# Patient Record
Sex: MALE | Race: WHITE | NOT HISPANIC OR LATINO | Employment: OTHER | URBAN - METROPOLITAN AREA
[De-identification: names, ages, dates, MRNs, and addresses within clinical notes are randomized per-mention and may not be internally consistent; named-entity substitution may affect disease eponyms.]

---

## 2017-05-02 ENCOUNTER — TELEPHONE (OUTPATIENT)
Dept: MEDICAL GROUP | Facility: PHYSICIAN GROUP | Age: 72
End: 2017-05-02

## 2017-05-02 NOTE — TELEPHONE ENCOUNTER
NEW PATIENT VISIT PRE-VISIT PLANNING    1.  E[employer] Patient is checked in Patient Demographics? YES    2.  Immunizations were updated in [facility] using WebIZ?: Yes       •  Web Iz Recommendations: FLU HEPATITIS A  HEPATITIS B PREVNAR (PCV13)  TDAP ZOSTAVAX (Shingles)    3.  Patient is due for the following Health Maintenance Topics:   There are no preventive care reminders to display for this patient.    - Patient declines Colonoscopy/FIT     4.  Reviewed/Updated the following with patient:       •   Preferred Pharmacy? YES       •   Preferred Lab? YES       •   Medications? YES. Was Abstract Encounter opened and chart updated? YES       •   Social History? YES. Was Abstract Encounter opened and chart updated? YES       •   Family History? YES. Was Abstract Encounter opened and chart updated? YES    5.  Updated Care Team?       •   DME Company (gait device, O2, CPAP, etc.) YES       •   Other Specialists (eye doctor, derm, GYN, cardiology, endo, etc): N\A    6.  Patient was informed to arrive 15 min prior to their scheduled appointment and bring in their medication bottles? YES

## 2017-05-03 ENCOUNTER — OFFICE VISIT (OUTPATIENT)
Dept: MEDICAL GROUP | Facility: PHYSICIAN GROUP | Age: 72
End: 2017-05-03
Payer: MEDICARE

## 2017-05-03 VITALS
DIASTOLIC BLOOD PRESSURE: 70 MMHG | BODY MASS INDEX: 33.59 KG/M2 | HEART RATE: 64 BPM | OXYGEN SATURATION: 93 % | HEIGHT: 72 IN | WEIGHT: 248.02 LBS | SYSTOLIC BLOOD PRESSURE: 118 MMHG | TEMPERATURE: 98.7 F

## 2017-05-03 DIAGNOSIS — M51.36 DEGENERATION OF LUMBAR INTERVERTEBRAL DISC: ICD-10-CM

## 2017-05-03 DIAGNOSIS — E66.9 OBESITY (BMI 30-39.9): ICD-10-CM

## 2017-05-03 DIAGNOSIS — K21.9 GASTROESOPHAGEAL REFLUX DISEASE WITHOUT ESOPHAGITIS: ICD-10-CM

## 2017-05-03 DIAGNOSIS — R06.02 SOB (SHORTNESS OF BREATH): ICD-10-CM

## 2017-05-03 DIAGNOSIS — R73.03 PREDIABETES: ICD-10-CM

## 2017-05-03 DIAGNOSIS — I10 ESSENTIAL HYPERTENSION: ICD-10-CM

## 2017-05-03 DIAGNOSIS — G47.33 OSA (OBSTRUCTIVE SLEEP APNEA): ICD-10-CM

## 2017-05-03 DIAGNOSIS — N17.9 AKI (ACUTE KIDNEY INJURY) (HCC): ICD-10-CM

## 2017-05-03 PROCEDURE — G8419 CALC BMI OUT NRM PARAM NOF/U: HCPCS | Performed by: INTERNAL MEDICINE

## 2017-05-03 PROCEDURE — 1101F PT FALLS ASSESS-DOCD LE1/YR: CPT | Performed by: INTERNAL MEDICINE

## 2017-05-03 PROCEDURE — G8432 DEP SCR NOT DOC, RNG: HCPCS | Performed by: INTERNAL MEDICINE

## 2017-05-03 PROCEDURE — 3017F COLORECTAL CA SCREEN DOC REV: CPT | Mod: 8P | Performed by: INTERNAL MEDICINE

## 2017-05-03 PROCEDURE — 4040F PNEUMOC VAC/ADMIN/RCVD: CPT | Mod: 8P | Performed by: INTERNAL MEDICINE

## 2017-05-03 PROCEDURE — 1036F TOBACCO NON-USER: CPT | Performed by: INTERNAL MEDICINE

## 2017-05-03 PROCEDURE — 99204 OFFICE O/P NEW MOD 45 MIN: CPT | Performed by: INTERNAL MEDICINE

## 2017-05-03 RX ORDER — OMEPRAZOLE 20 MG/1
20 CAPSULE, DELAYED RELEASE ORAL PRN
Qty: 90 CAP | Refills: 3 | Status: SHIPPED | OUTPATIENT
Start: 2017-05-03 | End: 2018-06-06 | Stop reason: SDUPTHER

## 2017-05-03 ASSESSMENT — PATIENT HEALTH QUESTIONNAIRE - PHQ9: CLINICAL INTERPRETATION OF PHQ2 SCORE: 0

## 2017-05-03 NOTE — ASSESSMENT & PLAN NOTE
Pt had lab work 02/2017, note mild JIHAN. Pt is taking advil post lumbar spine surgery. He denies dysuria, hematuria. GFR before normal. Advised to stop NSAIDs. Will reevaluate

## 2017-05-03 NOTE — ASSESSMENT & PLAN NOTE
EGD 2 years ago showed ness esophageus. I resumed omeprazole. Needs repeat EGD. Will try to get records, will refer pt on GI on next apt . Denies hematochezia or dysphagia.

## 2017-05-03 NOTE — ASSESSMENT & PLAN NOTE
Pt tells me that last month he start to feel sob when he walks. He does not feel short of breath when he exerts himself when he does yard work. He denies chest pain, nausea, vomiting vomiting, palpitation. He denies orthopnea. He has trace lower edema, which he is not sure for how long. He denies cough or wheezing

## 2017-05-03 NOTE — ASSESSMENT & PLAN NOTE
Laminectomy 01/2017   1.  L3 through S1 decompressive lumbar laminectomies, bilateral    foraminotomies.  2.  Left L3 transpedicular approach far lateral decompression left L3 nerve    root.  3.  Left L4 transpedicular approach far lateral decompression left L4 nerve    root.  4.  Left L5 transpedicular approach far lateral decompression left L5 nerve    Root.  Doing well, back pain significantly improved. He started back on exercising

## 2017-05-03 NOTE — MR AVS SNAPSHOT
Valeriy Stringerer   5/3/2017 8:20 AM   Office Visit   MRN: [id number]    Department:  [facility]   Dept Phone:  [phone]    Description:  Male : 1945   Provider:  Kaci Ward M.D.           Reason for Visit     Gastrophageal Reflux     Shortness of Breath during walking      Allergies as of 5/3/2017     No Known Allergies      You were diagnosed with     Essential hypertension   [8127982]       Degeneration of lumbar intervertebral disc   [510720]       Prediabetes   [626188]       Gastroesophageal reflux disease without esophagitis   [032590]       Obesity (BMI 30-39.9)   [889339]       SOB (shortness of breath)   [984789]       MAVERICK (obstructive sleep apnea)   [827513]       JIHAN (acute kidney injury) (CMS-HCC)   [931047]         Vital Signs     Blood Pressure Pulse Temperature Height Weight Body Mass Index    118/70 mmHg 64 37.1 °C (98.7 °F) 1.829 m (6') 112.5 kg (248 lb 0.3 oz) 33.63 kg/m2    Oxygen Saturation Smoking Status                93% Never Smoker           Basic Information     Date Of Birth Sex Race Ethnicity Preferred Language    1945 Male White Non- English      Problem List              ICD-10-CM Priority Class Noted - Resolved    Degeneration of lumbar intervertebral disc M51.36   2016 - Present    Essential hypertension I10   5/3/2017 - Present    Prediabetes R73.03   5/3/2017 - Present    Gastroesophageal reflux disease without esophagitis K21.9   5/3/2017 - Present    Obesity (BMI 30-39.9) E66.9   5/3/2017 - Present    SOB (shortness of breath) R06.02   5/3/2017 - Present    MAVERICK (obstructive sleep apnea) G47.33   5/3/2017 - Present    JIHAN (acute kidney injury) (CMS-HCC) N17.9   5/3/2017 - Present      Health Maintenance        Date Due Completion Dates    IMM DTaP/Tdap/Td Vaccine (1 - Tdap) 1965 ---    COLONOSCOPY 1996 ---    IMM ZOSTER VACCINE 2006 ---    IMM PNEUMOCOCCAL 65+ (ADULT) LOW/MEDIUM RISK SERIES (1 of 2 - PCV13) 2011  ---            Current Immunizations     Influenza Vaccine Quad Inj (Preserved) 11/19/2013      Below and/or attached are the medications your provider expects you to take. Review all of your home medications and newly ordered medications with your provider and/or pharmacist. Follow medication instructions as directed by your provider and/or pharmacist. Please keep your medication list with you and share with your provider. Update the information when medications are discontinued, doses are changed, or new medications (including over-the-counter products) are added; and carry medication information at all times in the event of emergency situations     Allergies:  No Known Allergies          Medications  Valid as of: May 03, 2017 -  9:07 AM    Generic Name Brand Name Tablet Size Instructions for use    Lisinopril (Tab) PRINIVIL 10 MG Take 10 mg by mouth every day.        Melatonin   Take 3 Tabs by mouth at bedtime as needed (For sleep).        Omeprazole (CAPSULE DELAYED RELEASE) PRILOSEC 20 MG Take 1 Cap by mouth as needed (For heartburn).        .                 Medicines prescribed today were sent to:     [facility]/PHARMACY #[id number] - [place] - [address] [place] [address]    Phone: [phone] Fax: [phone]    Open 24 Hours?: No      Medication refill instructions:       If your prescription bottle indicates you have medication refills left, it is not necessary to call your provider’s office. Please contact your pharmacy and they will refill your medication.    If your prescription bottle indicates you do not have any refills left, you may request refills at any time through one of the following ways: The online Cellay system (except Urgent Care), by calling your provider’s office, or by asking your pharmacy to contact your provider’s office with a refill request. Medication refills are processed only during regular business hours and may not be available until the next business day. Your provider  may request additional information or to have a follow-up visit with you prior to refilling your medication.   *Please Note: Medication refills are assigned a new Rx number when refilled electronically. Your pharmacy may indicate that no refills were authorized even though a new prescription for the same medication is available at the pharmacy. Please request the medicine by name with the pharmacy before contacting your provider for a refill.        Your To Do List     Future Labs/Procedures Complete By Expires    COMP METABOLIC PANEL  As directed 5/4/2018    HEMOGLOBIN A1C  As directed 5/4/2018    MICROALBUMIN CREAT RATIO URINE  As directed 5/4/2018    URINE CHLORIDE RANDOM  As directed 5/3/2018    URINE CREATININE RANDOM  As directed 5/3/2018    URINE POTASSIUM RANDOM  As directed 5/3/2018    URINE SODIUM RANDOM  As directed 5/3/2018         [employer] Access Code: Activation code not generated  Current [employer] Status: Active

## 2017-05-03 NOTE — PROGRESS NOTES
New Patient to Establish    Reason to establish: Hypertension, shortness of breath, lab work review     Valeriy Orlando is a 71 y.o. male here today for evaluation and management of:    JIHAN (acute kidney injury) (CMS-[facility])  Pt had lab work 02/2017, note mild JIHAN. Pt is taking advil post lumbar spine surgery. He denies dysuria, hematuria. GFR before normal. Advised to stop NSAIDs. Will reevaluate     Degeneration of lumbar intervertebral disc  Laminectomy 01/2017   1.  L3 through S1 decompressive lumbar laminectomies, bilateral    foraminotomies.  2.  Left L3 transpedicular approach far lateral decompression left L3 nerve    root.  3.  Left L4 transpedicular approach far lateral decompression left L4 nerve    root.  4.  Left L5 transpedicular approach far lateral decompression left L5 nerve    Root.  Doing well, back pain significantly improved. He started back on exercising     Essential hypertension  Well controled on lisinopril 10 mg daily     Gastroesophageal reflux disease without esophagitis  EGD 2 years ago showed ness esophageus. I resumed omeprazole. Needs repeat EGD. Will try to get records, will refer pt on GI on next apt . Denies hematochezia or dysphagia.    Obesity (BMI 30-39.9)  He likes ice cream. Counseling provided, small portion, balanced diet, exercising.    MAVERICK (obstructive sleep apnea)  He uses CPAP every night. Diagnosed 5 years ago.    Prediabetes  A1c 5.9. Will continue to monitor     SOB (shortness of breath)  Pt tells me that last month he start to feel sob when he walks. He does not feel short of breath when he exerts himself when he does yard work. He denies chest pain, nausea, vomiting vomiting, palpitation. He denies orthopnea. He has trace lower edema, which he is not sure for how long. He denies cough or wheezing         Past Medical History   Diagnosis Date   • Heart burn    • Snoring    • Hepatitis A 2001   • Sleep apnea      cpap in use   • Depression      pre diabetes   •  Hypertension    • Prediabetes        Current Outpatient Prescriptions   Medication Sig Dispense Refill   • omeprazole (PRILOSEC) 20 MG delayed-release capsule Take 1 Cap by mouth as needed (For heartburn). 90 Cap 3   • lisinopril (PRINIVIL) 10 MG Tab Take 10 mg by mouth every day.     • MELATONIN PO Take 3 Tabs by mouth at bedtime as needed (For sleep).       No current facility-administered medications for this visit.       Allergies as of 05/03/2017   • (No Known Allergies)       Social History     Social History   • Marital Status: Single     Spouse Name: N/A   • Number of Children: N/A   • Years of Education: N/A     Occupational History   • Not on file.     Social History Main Topics   • Smoking status: Never Smoker    • Smokeless tobacco: Never Used   • Alcohol Use: Yes      Comment: 25 per month/ 1 glass wine per day   • Drug Use: No   • Sexual Activity:     Partners: Female     Other Topics Concern   • Not on file     Social History Narrative       Family History   Problem Relation Age of Onset   • Heart Disease Mother    • Cancer Mother      lung cancer   • Heart Disease Maternal Grandmother    • Diabetes Paternal Grandfather        Past Surgical History   Procedure Laterality Date   • Other orthopedic surgery  1990     right knee scope   • Lumbar laminectomy diskectomy  11/29/2016     Procedure: LUMBAR LAMINECTOMY DISKECTOMY L3-5 open laminectomy ;  Surgeon: Bentley Claudio M.D.;  Location: SURGERY [facility];  Service:    • Knee arthroscopy     • Groin exploration       growth       ROS: All system reviewed and were negative except for history of present illness    /70 mmHg  Pulse 64  Temp(Src) 37.1 °C (98.7 °F)  Ht 1.829 m (6')  Wt 112.5 kg (248 lb 0.3 oz)  BMI 33.63 kg/m2  SpO2 93%    Physical Exam  General:  Alert and oriented, No apparent distress.  Eyes: Pupils equal and reactive. No scleral icterus. EOMI  Throat: Clear no erythema or exudates noted. Oral mucosa moist, oral dental  intact  Neck: Supple. No cervical or supraclavicular lymphadenopathy noted. Thyroid not enlarged.  Lungs: normal effort,  Clear to auscultation  Cardiovascular: Regular rate and rhythm. No murmurs, rubs or gallops, pulses intact   Abdomen:  Soft, +BS, no tenderness. No rebound or guarding noted. No hepato or splenomegaly   Extremities: No clubbing, cyanosis, trace pitting edema.  Neuro: cranial nerves intact, sensation intact   Muscle skeletal: moves all extremities, no back tenderness or muscle cramps , ROM intact   Skin: Clear. No rash , he has some seborrhoic keratosis on his hands, previous scar tissue from lesion removal         Assessment and Plan    1. Essential hypertension  Well controled. contineu same regimen   - COMP METABOLIC PANEL; Future  - MICROALBUMIN CREAT RATIO URINE; Future    2. Degeneration of lumbar intervertebral disc  Stable, tylenol prn. Avoid NSAIDs    3. Prediabetes  Counseling provided on low carb diet  - HEMOGLOBIN A1C; Future    4. Gastroesophageal reflux disease without esophagitis  Stable., however pt has King esophageus. Resume prilosec. reevaluate possible GI ref on next apt     5. Obesity (BMI 30-39.9)  Counseling provides  - Patient identified as having weight management issue.  Appropriate orders and counseling given.    6. SOB (shortness of breath)  Possible due to decondition or slight lung atelectasis due to anesthesia and being inactive     7. MAVERICK (obstructive sleep apnea)  Continue CPAP machine     8. JIHAN (acute kidney injury) (CMS-HCC)  Likely prerenal due to surgery and NSAIDs use  reevaluate   - COMP METABOLIC PANEL; Future  - URINE SODIUM RANDOM; Future  - URINE CHLORIDE RANDOM; Future  - URINE POTASSIUM RANDOM; Future  - URINE CREATININE RANDOM; Future      Followup: Return in about 4 weeks (around 5/31/2017) for Short.    Signed by: Kaci Ward M.D.

## 2017-05-03 NOTE — Clinical Note
[facility]  Kaci Ward M.D.  [address]  [place] [address]  Fax: [phone]   Authorization for Release/Disclosure of   Protected Health Information   Name: MICHELL ALANIS : 1945 SSN: [id number]   Address: [address] [place] [address] [place] [address] Phone:    [phone] (home)    I authorize the entity listed below to release/disclose the PHI below to:   [facility]/Kaci Ward M.D. and Kaci Ward M.D.   Provider or Entity Name:  [facility]   Address   City, [facility], [facility] Phone:  [phone]    Fax:  [phone]   Reason for request: continuity of care   Information to be released:    [  ] LAST COLONOSCOPY,  including any PATH REPORT and follow-up  [  ] LAST FIT/COLOGUARD RESULT [  ] LAST DEXA  [  ] LAST MAMMOGRAM  [  ] LAST PAP  [  ] LAST LABS [  ] RETINA EXAM REPORT  [  ] IMMUNIZATION RECORDS  [  ] Release all info      [  ] Check here and initial the line next to each item to release ALL health information INCLUDING  _____ Care and treatment for drug and / or alcohol abuse  _____ HIV testing, infection status, or AIDS  _____ Genetic Testing    DATES OF SERVICE OR TIME PERIOD TO BE DISCLOSED: _____________  I understand and acknowledge that:  * This Authorization may be revoked at any time by you in writing, except if your health information has already been used or disclosed.  * Your health information that will be used or disclosed as a result of you signing this authorization could be re-disclosed by the recipient. If this occurs, your re-disclosed health information may no longer be protected by State or Federal laws.  * You may refuse to sign this Authorization. Your refusal will not affect your ability to obtain treatment.  * This Authorization becomes effective upon signing and will  on (date) __________.      If no date is indicated, this Authorization will  one (1) year from the signature date.    Name: Michell Alanis    Signature:   Date:          5/3/2017       PLEASE FAX REQUESTED RECORDS BACK TO: [phone]

## 2017-05-10 ENCOUNTER — OFFICE VISIT (OUTPATIENT)
Dept: MEDICAL GROUP | Facility: PHYSICIAN GROUP | Age: 72
End: 2017-05-10
Payer: MEDICARE

## 2017-05-10 VITALS
HEIGHT: 72 IN | HEART RATE: 66 BPM | DIASTOLIC BLOOD PRESSURE: 90 MMHG | WEIGHT: 242.51 LBS | OXYGEN SATURATION: 95 % | SYSTOLIC BLOOD PRESSURE: 140 MMHG | BODY MASS INDEX: 32.85 KG/M2 | TEMPERATURE: 98.8 F

## 2017-05-10 DIAGNOSIS — M51.36 DEGENERATION OF LUMBAR INTERVERTEBRAL DISC: ICD-10-CM

## 2017-05-10 DIAGNOSIS — N17.9 AKI (ACUTE KIDNEY INJURY) (HCC): ICD-10-CM

## 2017-05-10 DIAGNOSIS — Z23 NEED FOR VACCINATION: ICD-10-CM

## 2017-05-10 DIAGNOSIS — Z12.11 SCREEN FOR COLON CANCER: ICD-10-CM

## 2017-05-10 DIAGNOSIS — I10 ESSENTIAL HYPERTENSION: ICD-10-CM

## 2017-05-10 DIAGNOSIS — K21.9 GASTROESOPHAGEAL REFLUX DISEASE WITHOUT ESOPHAGITIS: ICD-10-CM

## 2017-05-10 DIAGNOSIS — R06.02 SOB (SHORTNESS OF BREATH): ICD-10-CM

## 2017-05-10 DIAGNOSIS — R73.03 PREDIABETES: ICD-10-CM

## 2017-05-10 PROCEDURE — 4040F PNEUMOC VAC/ADMIN/RCVD: CPT | Performed by: INTERNAL MEDICINE

## 2017-05-10 PROCEDURE — G8432 DEP SCR NOT DOC, RNG: HCPCS | Performed by: INTERNAL MEDICINE

## 2017-05-10 PROCEDURE — 90670 PCV13 VACCINE IM: CPT | Performed by: INTERNAL MEDICINE

## 2017-05-10 PROCEDURE — 99214 OFFICE O/P EST MOD 30 MIN: CPT | Mod: 25 | Performed by: INTERNAL MEDICINE

## 2017-05-10 PROCEDURE — 90471 IMMUNIZATION ADMIN: CPT | Performed by: INTERNAL MEDICINE

## 2017-05-10 PROCEDURE — G0009 ADMIN PNEUMOCOCCAL VACCINE: HCPCS | Performed by: INTERNAL MEDICINE

## 2017-05-10 PROCEDURE — 90715 TDAP VACCINE 7 YRS/> IM: CPT | Performed by: INTERNAL MEDICINE

## 2017-05-10 PROCEDURE — 1036F TOBACCO NON-USER: CPT | Performed by: INTERNAL MEDICINE

## 2017-05-10 PROCEDURE — 1101F PT FALLS ASSESS-DOCD LE1/YR: CPT | Performed by: INTERNAL MEDICINE

## 2017-05-10 PROCEDURE — G8419 CALC BMI OUT NRM PARAM NOF/U: HCPCS | Performed by: INTERNAL MEDICINE

## 2017-05-10 RX ORDER — LISINOPRIL 10 MG/1
10 TABLET ORAL DAILY
Qty: 90 TAB | Refills: 3 | Status: SHIPPED | OUTPATIENT
Start: 2017-05-10 | End: 2018-05-04 | Stop reason: SDUPTHER

## 2017-05-10 NOTE — ASSESSMENT & PLAN NOTE
EGD 2015 showed Jimenez esophageous. Needs to follow up with GI. I encouraged him to take omeprazole 20 mg daily. Follow up with GI. Denies hematochezia or dysphagia

## 2017-05-10 NOTE — MR AVS SNAPSHOT
Valeriy Orlando   5/10/2017 10:00 AM   Office Visit   MRN: [id number]    Department:  [facility]   Dept Phone:  [phone]    Description:  Male : 1945   Provider:  Kaci Ward M.D.           Reason for Visit     Back Pain lower, left side    Medication Refill lisinorpil    Injection pneumo, tdap, shingle      Allergies as of 5/10/2017     No Known Allergies      You were diagnosed with     Degeneration of lumbar intervertebral disc   [041505]       Prediabetes   [948813]       Screen for colon cancer   [435630]       Essential hypertension   [1219891]       Gastroesophageal reflux disease without esophagitis   [852856]       Need for vaccination   [477519]       SOB (shortness of breath)   [804437]       JIHAN (acute kidney injury) (CMS-[facility])   [829274]         Vital Signs     Blood Pressure Pulse Temperature Height Weight Body Mass Index    140/90 mmHg 66 37.1 °C (98.8 °F) 1.829 m (6') 110 kg (242 lb 8.1 oz) 32.88 kg/m2    Oxygen Saturation Smoking Status                95% Never Smoker           Basic Information     Date Of Birth Sex Race Ethnicity Preferred Language    1945 Male White Non- English      Your appointments     2017  8:40 AM   Established Patient with Kaci Ward M.D.   [facility] (--)    [address] [facility]  [address]  [place] [id number] [phone]           You will be receiving a confirmation call a few days before your appointment from our automated call confirmation system.              Problem List              ICD-10-CM Priority Class Noted - Resolved    Degeneration of lumbar intervertebral disc M51.36   2016 - Present    Essential hypertension I10   5/3/2017 - Present    Prediabetes R73.03   5/3/2017 - Present    Gastroesophageal reflux disease without esophagitis K21.9   5/3/2017 - Present    Obesity (BMI 30-39.9) E66.9   5/3/2017 - Present    SOB (shortness of breath) R06.02   5/3/2017 - Present    MAVERICK (obstructive sleep  apnea) G47.33   5/3/2017 - Present    JIHAN (acute kidney injury) (CMS-HCC) N17.9   5/3/2017 - Present      Health Maintenance        Date Due Completion Dates    IMM DTaP/Tdap/Td Vaccine (1 - Tdap) 12/11/1965 ---    COLONOSCOPY 12/11/1996 ---    IMM ZOSTER VACCINE 12/11/2006 ---    IMM PNEUMOCOCCAL 65+ (ADULT) LOW/MEDIUM RISK SERIES (1 of 2 - PCV13) 12/11/2011 ---            Current Immunizations     13-VALENT PCV PREVNAR  Incomplete    Influenza Vaccine Quad Inj (Preserved) 11/19/2013    Tdap Vaccine  Incomplete      Below and/or attached are the medications your provider expects you to take. Review all of your home medications and newly ordered medications with your provider and/or pharmacist. Follow medication instructions as directed by your provider and/or pharmacist. Please keep your medication list with you and share with your provider. Update the information when medications are discontinued, doses are changed, or new medications (including over-the-counter products) are added; and carry medication information at all times in the event of emergency situations     Allergies:  No Known Allergies          Medications  Valid as of: May 10, 2017 - 10:39 AM    Generic Name Brand Name Tablet Size Instructions for use    Lisinopril (Tab) PRINIVIL 10 MG Take 1 Tab by mouth every day.        Melatonin   Take 3 Tabs by mouth at bedtime as needed (For sleep).        Omeprazole (CAPSULE DELAYED RELEASE) PRILOSEC 20 MG Take 1 Cap by mouth as needed (For heartburn).        .                 Medicines prescribed today were sent to:     [facility]/PHARMACY #[id number] - [place] - [address] [place] [address]    Phone: [phone] Fax: [phone]    Open 24 Hours?: No      Medication refill instructions:       If your prescription bottle indicates you have medication refills left, it is not necessary to call your provider’s office. Please contact your pharmacy and they will refill your medication.    If your prescription  bottle indicates you do not have any refills left, you may request refills at any time through one of the following ways: The online [employer] system (except Urgent Care), by calling your provider’s office, or by asking your pharmacy to contact your provider’s office with a refill request. Medication refills are processed only during regular business hours and may not be available until the next business day. Your provider may request additional information or to have a follow-up visit with you prior to refilling your medication.   *Please Note: Medication refills are assigned a new Rx number when refilled electronically. Your pharmacy may indicate that no refills were authorized even though a new prescription for the same medication is available at the pharmacy. Please request the medicine by name with the pharmacy before contacting your provider for a refill.        Referral     A referral request has been sent to our patient care coordination department. Please allow 3-5 business days for us to process this request and contact you either by phone or mail. If you do not hear from us by the 5th business day, please call us at [phone].           [employer] Access Code: Activation code not generated  Current [employer] Status: Active

## 2017-05-10 NOTE — ASSESSMENT & PLAN NOTE
Patient tells me that he still continues to have shortness of breath when he walks. He does not have any shortness of breath when exerting himself or when he does yard work. Denies any chest pain, nausea, vomiting, palpitation. He denies any cough or wheezing

## 2017-05-10 NOTE — ASSESSMENT & PLAN NOTE
Laminectomy 01/2017    1.  L3 through S1 decompressive lumbar laminectomies, bilateral    foraminotomies.  2.  Left L3 transpedicular approach far lateral decompression left L3 nerve    root.  3.  Left L4 transpedicular approach far lateral decompression left L4 nerve    root.  4.  Left L5 transpedicular approach far lateral decompression left L5 nerve    Root.  Today he tells me that he has some left sided flank pain when he stand up. No tenderness. He denies any radiation. He described pain and sharp. Relieves when he is walking. He denies any hematuria or dysuria. He stopped taking naproxen. He is taking Tylenol as needed with good relief.

## 2017-05-10 NOTE — PROGRESS NOTES
Subjective:   Valeriy Orlando is a 71 y.o. male here today for pain, hypertension, lab work    SOB (shortness of breath)  Patient tells me that he still continues to have shortness of breath when he walks. He does not have any shortness of breath when exerting himself or when he does yard work. Denies any chest pain, nausea, vomiting, palpitation. He denies any cough or wheezing    Prediabetes  a1c 5.9. Lab work pending     Gastroesophageal reflux disease without esophagitis  EGD 2015 showed Jimenez esophageous. Needs to follow up with GI. I encouraged him to take omeprazole 20 mg daily. Follow up with GI. Denies hematochezia or dysphagia    Essential hypertension  Slightly elevated today. Blood pressure today on the 140/90. He is currently on lisinopril 10 mg daily . He denies any headache, chest pain, blurred vision, palpitation, leg swelling.    Degeneration of lumbar intervertebral disc  Laminectomy 01/2017    1.  L3 through S1 decompressive lumbar laminectomies, bilateral    foraminotomies.  2.  Left L3 transpedicular approach far lateral decompression left L3 nerve    root.  3.  Left L4 transpedicular approach far lateral decompression left L4 nerve    root.  4.  Left L5 transpedicular approach far lateral decompression left L5 nerve    Root.  Today he tells me that he has some left sided flank pain when he stand up. No tenderness. He denies any radiation. He described pain and sharp. Relieves when he is walking. He denies any hematuria or dysuria. He stopped taking naproxen. He is taking Tylenol as needed with good relief.    JIHAN (acute kidney injury) (CMS-[facility])  Pt had lab work 02/2017, mild JIHAN. Stopped NSAIDs. Lab work pending         Current medicines (including changes today)  Current Outpatient Prescriptions   Medication Sig Dispense Refill   • lisinopril (PRINIVIL) 10 MG Tab Take 1 Tab by mouth every day. 90 Tab 3   • omeprazole (PRILOSEC) 20 MG delayed-release capsule Take 1 Cap by mouth as needed  (For heartburn). 90 Cap 3   • MELATONIN PO Take 3 Tabs by mouth at bedtime as needed (For sleep).       No current facility-administered medications for this visit.     He  has a past medical history of Heart burn; Snoring; Hepatitis A (2001); Sleep apnea; Depression; Hypertension; and Prediabetes.    Current Outpatient Prescriptions   Medication Sig Dispense Refill   • lisinopril (PRINIVIL) 10 MG Tab Take 1 Tab by mouth every day. 90 Tab 3   • omeprazole (PRILOSEC) 20 MG delayed-release capsule Take 1 Cap by mouth as needed (For heartburn). 90 Cap 3   • MELATONIN PO Take 3 Tabs by mouth at bedtime as needed (For sleep).       No current facility-administered medications for this visit.       Allergies as of 05/10/2017   • (No Known Allergies)       Social History     Social History   • Marital Status: Single     Spouse Name: N/A   • Number of Children: N/A   • Years of Education: N/A     Occupational History   • Not on file.     Social History Main Topics   • Smoking status: Never Smoker    • Smokeless tobacco: Never Used   • Alcohol Use: Yes      Comment: 25 per month/ 1 glass wine per day   • Drug Use: No   • Sexual Activity:     Partners: Female     Other Topics Concern   • Not on file     Social History Narrative        Family History   Problem Relation Age of Onset   • Heart Disease Mother    • Cancer Mother      lung cancer   • Heart Disease Maternal Grandmother    • Diabetes Paternal Grandfather        Past Surgical History   Procedure Laterality Date   • Other orthopedic surgery  1990     right knee scope   • Lumbar laminectomy diskectomy  11/29/2016     Procedure: LUMBAR LAMINECTOMY DISKECTOMY L3-5 open laminectomy ;  Surgeon: Bentley Claudio M.D.;  Location: SURGERY [facility];  Service:    • Knee arthroscopy     • Groin exploration       growth       ROS   No chest pain, no shortness of breath, no abdominal pain       Objective:     Blood pressure 140/90, pulse 66, temperature 37.1 °C (98.8 °F),  height 1.829 m (6'), weight 110 kg (242 lb 8.1 oz), SpO2 95 %. Body mass index is 32.88 kg/(m^2).   Physical Exam:  Constitutional: Alert, no distress.  Skin: Warm, dry, good turgor, no rashes in visible areas.  Eye: Equal, round and reactive, conjunctiva clear, lids normal.  ENMT: Lips without lesions, good dentition, oropharynx clear.  Neck: Trachea midline, no masses, no thyromegaly. No cervical or supraclavicular lymphadenopathy  Respiratory: Unlabored respiratory effort, lungs clear to auscultation, no wheezes, no ronchi.  Cardiovascular: Normal S1, S2, no murmur, no edema.  Abdomen: Soft, non-tender, no masses, no hepatosplenomegaly.  SPINE: No significant spinal curvature on forward bend. No  tenderness in paraspinous muscles lumbar spine without current spasm. SLT negative. DTR 2+ patella. Strength 5/5 proximal and distal LE.  No pain with stress of SI. Full hip ROM. Poor hamstring flexibility. No symptoms with axial loading.   Psych: Alert and oriented x3, normal affect and mood.        Assessment and Plan:   The following treatment plan was discussed    1. Degeneration of lumbar intervertebral disc  Musculoskeletal pain. Pain is not colicky pain. UA to rule out nephrolithiasis  - UA/M W/RFLX CULTURE, ROUTINE    2. Prediabetes  Counseling provided in weight loss, low carb diet and low-fat diet  We'll continue to monitor    3. Screen for colon cancer  - REFERRAL TO GI FOR COLONOSCOPY    4. Essential hypertension  Continue to monitor. Continue lisinopril 10 mg daily  - lisinopril (PRINIVIL) 10 MG Tab; Take 1 Tab by mouth every day.  Dispense: 90 Tab; Refill: 3    5. Gastroesophageal reflux disease without esophagitis  King esophagus. Likely need repeat EGD . Continue omeprazole 20 mg daily  - REFERRAL TO GI FOR COLONOSCOPY    6. Need for vaccination  - TDAP VACCINE =>8YO IM  - PNEUMOCOCCAL CONJUGATE VACCINE 13-VALENT    7. SOB (shortness of breath)  Possible atelectasis. EKG 12/2016 no ST changes . Advised  excersing     8. JIHAN (acute kidney injury) (CMS-HCC)  Possible prerenal, NSAIDs use??  Continue to monitor       Followup: Return in about 4 weeks (around 6/7/2017) for Short.

## 2017-05-10 NOTE — ASSESSMENT & PLAN NOTE
Slightly elevated today. Blood pressure today on the 140/90. He is currently on lisinopril 10 mg daily . He denies any headache, chest pain, blurred vision, palpitation, leg swelling.

## 2017-05-16 ENCOUNTER — HOSPITAL ENCOUNTER (OUTPATIENT)
Dept: LAB | Facility: MEDICAL CENTER | Age: 72
End: 2017-05-16
Attending: INTERNAL MEDICINE
Payer: MEDICARE

## 2017-05-16 DIAGNOSIS — R73.03 PREDIABETES: ICD-10-CM

## 2017-05-16 DIAGNOSIS — N17.9 AKI (ACUTE KIDNEY INJURY) (HCC): ICD-10-CM

## 2017-05-16 DIAGNOSIS — I10 ESSENTIAL HYPERTENSION: ICD-10-CM

## 2017-05-16 LAB
ALBUMIN SERPL BCP-MCNC: 4.4 G/DL (ref 3.2–4.9)
ALBUMIN/GLOB SERPL: 1.4 G/DL
ALP SERPL-CCNC: 53 U/L (ref 30–99)
ALT SERPL-CCNC: 22 U/L (ref 2–50)
ANION GAP SERPL CALC-SCNC: 6 MMOL/L (ref 0–11.9)
APPEARANCE UR: CLEAR
AST SERPL-CCNC: 16 U/L (ref 12–45)
BILIRUB SERPL-MCNC: 0.6 MG/DL (ref 0.1–1.5)
BILIRUB UR QL STRIP.AUTO: NEGATIVE
BUN SERPL-MCNC: 35 MG/DL (ref 8–22)
CALCIUM SERPL-MCNC: 10.3 MG/DL (ref 8.5–10.5)
CHLORIDE SERPL-SCNC: 105 MMOL/L (ref 96–112)
CHLORIDE UR-SCNC: 109 MMOL/L
CO2 SERPL-SCNC: 25 MMOL/L (ref 20–33)
COLOR UR: COLORLESS
CREAT SERPL-MCNC: 1.39 MG/DL (ref 0.5–1.4)
CREAT UR-MCNC: 61.1 MG/DL
CREAT UR-MCNC: 61.4 MG/DL
CULTURE IF INDICATED INDCX: NO UA CULTURE
EST. AVERAGE GLUCOSE BLD GHB EST-MCNC: 123 MG/DL
GFR SERPL CREATININE-BSD FRML MDRD: 50 ML/MIN/1.73 M 2
GLOBULIN SER CALC-MCNC: 3.1 G/DL (ref 1.9–3.5)
GLUCOSE SERPL-MCNC: 119 MG/DL (ref 65–99)
GLUCOSE UR STRIP.AUTO-MCNC: NEGATIVE MG/DL
HBA1C MFR BLD: 5.9 % (ref 0–5.6)
KETONES UR STRIP.AUTO-MCNC: NEGATIVE MG/DL
LEUKOCYTE ESTERASE UR QL STRIP.AUTO: NEGATIVE
MICRO URNS: NORMAL
MICROALBUMIN UR-MCNC: <0.7 MG/DL
MICROALBUMIN/CREAT UR: NORMAL MG/G (ref 0–30)
NITRITE UR QL STRIP.AUTO: NEGATIVE
PH UR STRIP.AUTO: 5.5 [PH]
POTASSIUM SERPL-SCNC: 4.8 MMOL/L (ref 3.6–5.5)
POTASSIUM UR-SCNC: 45.8 MMOL/L
PROT SERPL-MCNC: 7.5 G/DL (ref 6–8.2)
PROT UR QL STRIP: NEGATIVE MG/DL
RBC UR QL AUTO: NEGATIVE
SODIUM SERPL-SCNC: 136 MMOL/L (ref 135–145)
SODIUM UR-SCNC: 84 MMOL/L
SP GR UR STRIP.AUTO: 1.01

## 2017-05-16 PROCEDURE — 84300 ASSAY OF URINE SODIUM: CPT

## 2017-05-16 PROCEDURE — 82436 ASSAY OF URINE CHLORIDE: CPT

## 2017-05-16 PROCEDURE — 80053 COMPREHEN METABOLIC PANEL: CPT

## 2017-05-16 PROCEDURE — 82570 ASSAY OF URINE CREATININE: CPT

## 2017-05-16 PROCEDURE — 83036 HEMOGLOBIN GLYCOSYLATED A1C: CPT | Mod: GA

## 2017-05-16 PROCEDURE — 36415 COLL VENOUS BLD VENIPUNCTURE: CPT

## 2017-05-16 PROCEDURE — 84133 ASSAY OF URINE POTASSIUM: CPT

## 2017-05-16 PROCEDURE — 81003 URINALYSIS AUTO W/O SCOPE: CPT

## 2017-05-16 PROCEDURE — 82043 UR ALBUMIN QUANTITATIVE: CPT

## 2017-05-17 ENCOUNTER — TELEPHONE (OUTPATIENT)
Dept: MEDICAL GROUP | Facility: PHYSICIAN GROUP | Age: 72
End: 2017-05-17

## 2017-05-17 NOTE — TELEPHONE ENCOUNTER
----- Message from Kaci Ward M.D. sent at 5/16/2017  7:11 PM PDT -----  Please let the patient on the urine test is normalno protein or blood into his urine. He is electrolytes, liver enzyme are normal. He is prediabetic and therefore I encouraged patient to have low-carb diet. His kidney function is slightly worse likely due to dehydration please encourage patient to drink plenty of fluids and avoid medications as NSAIDs like ibuprofen, Motrin, Advil. I'll recheck his kidney function at next appointment.  Thank you,   Kaci Ward M.D.

## 2017-06-05 ENCOUNTER — TELEPHONE (OUTPATIENT)
Dept: MEDICAL GROUP | Facility: PHYSICIAN GROUP | Age: 72
End: 2017-06-05

## 2017-06-05 NOTE — TELEPHONE ENCOUNTER
ESTABLISHED PATIENT PRE-VISIT PLANNING     Note: Patient will not be contacted if there is no indication to call.     1.  Reviewed notes from the last few office visits within the medical group: Yes    2.  If any orders were placed at last visit or intended to be done for this visit (i.e. 6 mos follow-up), do we have Results/Consult Notes?        •  Labs - Labs ordered, completed and results are in chart.       •  Imaging - Imaging was not ordered at last office visit.       •  Referrals - Referral ordered, patient has NOT been seen.    3. Is this appointment scheduled as a Hospital Follow-Up? No    4.  Immunizations were updated in [employer] using WebIZ?: Yes       •  Web Iz Recommendations: FLU HEPATITIS A  ZOSTAVAX (Shingles)    5.  Patient is due for the following Health Maintenance Topics:   Health Maintenance Due   Topic Date Due   • Annual Wellness Visit  1945   • COLONOSCOPY  12/11/1996   • IMM ZOSTER VACCINE  12/11/2006       - Patient has completed FLU, PREVNAR (PCV13)  and TDAP Immunization(s) per WebIZ. Chart has been updated.    6.  Patient was NOT informed to arrive 15 min prior to their scheduled appointment and bring in their medication bottles.

## 2017-06-06 ENCOUNTER — OFFICE VISIT (OUTPATIENT)
Dept: MEDICAL GROUP | Facility: PHYSICIAN GROUP | Age: 72
End: 2017-06-06
Payer: MEDICARE

## 2017-06-06 ENCOUNTER — HOSPITAL ENCOUNTER (OUTPATIENT)
Dept: LAB | Facility: MEDICAL CENTER | Age: 72
End: 2017-06-06
Attending: INTERNAL MEDICINE
Payer: MEDICARE

## 2017-06-06 VITALS
SYSTOLIC BLOOD PRESSURE: 138 MMHG | WEIGHT: 243 LBS | DIASTOLIC BLOOD PRESSURE: 86 MMHG | BODY MASS INDEX: 32.2 KG/M2 | RESPIRATION RATE: 16 BRPM | HEIGHT: 73 IN | TEMPERATURE: 98.6 F | HEART RATE: 67 BPM | OXYGEN SATURATION: 93 %

## 2017-06-06 DIAGNOSIS — R14.0 ABDOMINAL BLOATING: ICD-10-CM

## 2017-06-06 DIAGNOSIS — M51.36 DEGENERATION OF LUMBAR INTERVERTEBRAL DISC: ICD-10-CM

## 2017-06-06 DIAGNOSIS — K21.9 GASTROESOPHAGEAL REFLUX DISEASE WITHOUT ESOPHAGITIS: ICD-10-CM

## 2017-06-06 DIAGNOSIS — R06.02 SOB (SHORTNESS OF BREATH): ICD-10-CM

## 2017-06-06 DIAGNOSIS — N17.9 AKI (ACUTE KIDNEY INJURY) (HCC): ICD-10-CM

## 2017-06-06 DIAGNOSIS — R94.31 ABNORMAL EKG: ICD-10-CM

## 2017-06-06 DIAGNOSIS — I10 ESSENTIAL HYPERTENSION: ICD-10-CM

## 2017-06-06 LAB
ANION GAP SERPL CALC-SCNC: 6 MMOL/L (ref 0–11.9)
BUN SERPL-MCNC: 30 MG/DL (ref 8–22)
CALCIUM SERPL-MCNC: 9.4 MG/DL (ref 8.5–10.5)
CHLORIDE SERPL-SCNC: 107 MMOL/L (ref 96–112)
CO2 SERPL-SCNC: 26 MMOL/L (ref 20–33)
CREAT SERPL-MCNC: 1.35 MG/DL (ref 0.5–1.4)
GFR SERPL CREATININE-BSD FRML MDRD: 52 ML/MIN/1.73 M 2
GLUCOSE SERPL-MCNC: 107 MG/DL (ref 65–99)
POTASSIUM SERPL-SCNC: 5 MMOL/L (ref 3.6–5.5)
SODIUM SERPL-SCNC: 139 MMOL/L (ref 135–145)

## 2017-06-06 PROCEDURE — 36415 COLL VENOUS BLD VENIPUNCTURE: CPT

## 2017-06-06 PROCEDURE — 99214 OFFICE O/P EST MOD 30 MIN: CPT | Performed by: INTERNAL MEDICINE

## 2017-06-06 PROCEDURE — G8432 DEP SCR NOT DOC, RNG: HCPCS | Performed by: INTERNAL MEDICINE

## 2017-06-06 PROCEDURE — 80048 BASIC METABOLIC PNL TOTAL CA: CPT

## 2017-06-06 PROCEDURE — 1036F TOBACCO NON-USER: CPT | Performed by: INTERNAL MEDICINE

## 2017-06-06 PROCEDURE — G8419 CALC BMI OUT NRM PARAM NOF/U: HCPCS | Performed by: INTERNAL MEDICINE

## 2017-06-06 PROCEDURE — 1101F PT FALLS ASSESS-DOCD LE1/YR: CPT | Performed by: INTERNAL MEDICINE

## 2017-06-06 PROCEDURE — 4040F PNEUMOC VAC/ADMIN/RCVD: CPT | Performed by: INTERNAL MEDICINE

## 2017-06-06 NOTE — ASSESSMENT & PLAN NOTE
Ordered EKG because of SOB. EKG is sinus rhythm, CA interval 197 (first degree block) , normal axis deviations, Q wave on inferior leads (III, aVF) and poor R progression on anterior  Leads (V2-V3), T inversions on III and aVF.   As per above he has SOB, possible equivalent to angina?? No typical chest pain, no active angina at this time. Stable angina?? Possible atypical angina. Pt is prediabetic, he is only on lisinopril. Referral to cardiology asap for MPI and echo.

## 2017-06-06 NOTE — ASSESSMENT & PLAN NOTE
S/p laminectomy, 01/2007. Still sometimes his back hurt, when he lifts up heavy objects at yard. He is very careful to not lift up heavy objects . Better if he walks. No radiation. He was doing better initally after surgery. He is using tylenol prn and that helps with the pain., he likes to stay active. He is back on exercising . Denies trauma.

## 2017-06-06 NOTE — PROGRESS NOTES
Subjective:   Valeriy Orlando is a 71 y.o. male here today for bloating, hypertension, GERD, lab work, sob    Degeneration of lumbar intervertebral disc  S/p laminectomy, 01/2007. Still sometimes his back hurt, when he lifts up heavy objects at yard. He is very careful to not lift up heavy objects . Better if he walks. No radiation. He was doing better initally after surgery. He is using tylenol prn and that helps with the pain., he likes to stay active. He is back on exercising . Denies trauma.     Essential hypertension  Well-controlled on lisinopril 10 mg daily. He denies headache, chest pain, blurry vision, palpitation, leg swelling    Gastroesophageal reflux disease without esophagitis  EGD 2015 showed Jimenez esophageous. He is back on omeprazole and he is feeling better. He denies melanotic stool, hematochezia, abdominal pain, unintentional weight loss. He called GI and was told that he needs a repeat EGD and colonoscopy in 5 years. Last colonoscopy 2015    SOB (shortness of breath)  He still reports shortness of breath when he walks. He denies shortness of breath when he exercises or when he does yard work. He denies chest pain, nausea, vomiting, palpitation. He denies coughing or wheezing. He reports second hand exposure when he was a kid from his parents. He denies orthopnea or PND. No medardo heart disease      JIHAN (acute kidney injury) (CMS-[facility])  JIHAN 05/16/2017. He used to be on NSAIDs at that time. Will reorder lab work for reevaluation. he denies hematuria, dysuria     Abdominal bloating  He tells me that sometimes he feels bloated especially if he eats junk food. he denies abdominal pain, acid reflux , melanotic stools, unintentional weight loss, hematochezia. He is up to date with colonoscopy. He does not eat yogurt regular.    Abnormal EKG  Ordered EKG because of SOB. EKG is sinus rhythm, WA interval 197 (first degree block) , normal axis deviations, Q wave on inferior leads (III, aVF) and poor R  progression on anterior  Leads (V2-V3), T inversions on III and aVF.   As per above he has SOB, possible equivalent to angina?? No typical chest pain, no active angina at this time. Stable angina?? Possible atypical angina. Pt is prediabetic, he is only on lisinopril. Referral to cardiology asap for MPI and echo.         Current medicines (including changes today)  Current Outpatient Prescriptions   Medication Sig Dispense Refill   • lisinopril (PRINIVIL) 10 MG Tab Take 1 Tab by mouth every day. 90 Tab 3   • omeprazole (PRILOSEC) 20 MG delayed-release capsule Take 1 Cap by mouth as needed (For heartburn). 90 Cap 3   • MELATONIN PO Take 3 Tabs by mouth at bedtime as needed (For sleep).       No current facility-administered medications for this visit.     He  has a past medical history of Heart burn; Snoring; Hepatitis A (2001); Sleep apnea; Depression; Hypertension; and Prediabetes.    Current Outpatient Prescriptions   Medication Sig Dispense Refill   • lisinopril (PRINIVIL) 10 MG Tab Take 1 Tab by mouth every day. 90 Tab 3   • omeprazole (PRILOSEC) 20 MG delayed-release capsule Take 1 Cap by mouth as needed (For heartburn). 90 Cap 3   • MELATONIN PO Take 3 Tabs by mouth at bedtime as needed (For sleep).       No current facility-administered medications for this visit.       Allergies as of 06/06/2017   • (No Known Allergies)       Social History     Social History   • Marital Status: Single     Spouse Name: N/A   • Number of Children: N/A   • Years of Education: N/A     Occupational History   • Not on file.     Social History Main Topics   • Smoking status: Never Smoker    • Smokeless tobacco: Never Used   • Alcohol Use: Yes      Comment: 25 per month/ 1 glass wine per day   • Drug Use: No   • Sexual Activity:     Partners: Female     Other Topics Concern   • Not on file     Social History Narrative        Family History   Problem Relation Age of Onset   • Heart Disease Mother    • Cancer Mother      lung cancer  "  • Heart Disease Maternal Grandmother    • Diabetes Paternal Grandfather        Past Surgical History   Procedure Laterality Date   • Other orthopedic surgery  1990     right knee scope   • Lumbar laminectomy diskectomy  11/29/2016     Procedure: LUMBAR LAMINECTOMY DISKECTOMY L3-5 open laminectomy ;  Surgeon: Bentley Claudio M.D.;  Location: SURGERY [facility];  Service:    • Knee arthroscopy     • Groin exploration       growth       ROS   All systems reviewed are negative except for HPI       Objective:     Blood pressure 138/86, pulse 67, temperature 37 °C (98.6 °F), resp. rate 16, height 1.842 m (6' 0.52\"), weight 110.224 kg (243 lb), SpO2 93 %. Body mass index is 32.49 kg/(m^2).   Physical Exam:  Constitutional: Alert, no distress.  Skin: Warm, dry, good turgor, no rashes in visible areas.  Eye: Equal, round and reactive, conjunctiva clear, lids normal.  ENMT: Lips without lesions, good dentition, oropharynx clear.  Neck: Trachea midline, no masses, no thyromegaly. No cervical or supraclavicular lymphadenopathy  Respiratory: Unlabored respiratory effort, lungs clear to auscultation, no wheezes, no ronchi.  Cardiovascular: Normal S1, S2, no murmur, trace edema bilateral  Abdomen: Soft, non-tender, no masses, no hepatosplenomegaly.  Psych: Alert and oriented x3, normal affect and mood.        Assessment and Plan:   The following treatment plan was discussed    1. SOB (shortness of breath)  2. Abnormal EKG   EKG reading as per above . Needs cards evaluation as soon as possible. Prediabetic. No signs of unstable angina at this time. Possible old MI ???   - EKG; Future    3. Gastroesophageal reflux disease without esophagitis  Continue omeprazole, follow up routine EGD with GI     4. JIHAN (acute kidney injury) (CMS-HCC)  Continue to monitor . Lab work to reevaluate   - BASIC METABOLIC PANEL; Future    5. Essential hypertension  Continue to monitor. Continue same treatment   - BASIC METABOLIC PANEL; Future    6. " Degeneration of lumbar intervertebral disc  Tylenol prn.     7. Abdominal bloating  Probiotic, or activa. No red flag. Up to date with colonoscopy       Followup: Return in about 4 weeks (around 7/4/2017).

## 2017-06-06 NOTE — ASSESSMENT & PLAN NOTE
He tells me that sometimes he feels bloated especially if he eats junk food. he denies abdominal pain, acid reflux , melanotic stools, unintentional weight loss, hematochezia. He is up to date with colonoscopy. He does not eat yogurt regular.

## 2017-06-06 NOTE — ASSESSMENT & PLAN NOTE
Well-controlled on lisinopril 10 mg daily. He denies headache, chest pain, blurry vision, palpitation, leg swelling

## 2017-06-06 NOTE — MR AVS SNAPSHOT
"        Valeriy Orlando   2017 8:40 AM   Office Visit   MRN: [id number]    Department:  [facility]   Dept Phone:  [phone]    Description:  Male : 1945   Provider:  Kaci Ward M.D.           Reason for Visit     Follow-Up 1 month follow up heart burn      Allergies as of 2017     No Known Allergies      You were diagnosed with     Gastroesophageal reflux disease without esophagitis   [439457]       JIHAN (acute kidney injury) (CMS-HCC)   [788462]       SOB (shortness of breath)   [403726]       Essential hypertension   [5124789]       Degeneration of lumbar intervertebral disc   [003535]       Abdominal bloating   [270676]         Vital Signs     Blood Pressure Pulse Temperature Respirations Height Weight    138/86 mmHg 67 37 °C (98.6 °F) 16 1.842 m (6' 0.52\") 110.224 kg (243 lb)    Body Mass Index Oxygen Saturation Smoking Status             32.49 kg/m2 93% Never Smoker          Basic Information     Date Of Birth Sex Race Ethnicity Preferred Language    1945 Male White Non- English      Your appointments     Dec 12, 2017  8:20 AM   Established Patient with Kcai Ward M.D.   [facility] (--)    [address]  [address]  [facility] [address] [phone]           You will be receiving a confirmation call a few days before your appointment from our automated call confirmation system.              Problem List              ICD-10-CM Priority Class Noted - Resolved    Degeneration of lumbar intervertebral disc M51.36   2016 - Present    Essential hypertension I10   5/3/2017 - Present    Prediabetes R73.03   5/3/2017 - Present    Gastroesophageal reflux disease without esophagitis K21.9   5/3/2017 - Present    Obesity (BMI 30-39.9) E66.9   5/3/2017 - Present    SOB (shortness of breath) R06.02   5/3/2017 - Present    MAVERICK (obstructive sleep apnea) G47.33   5/3/2017 - Present    JIHAN (acute kidney injury) (CMS-HCC) N17.9   5/3/2017 - Present    Abdominal " bloating R14.0   6/6/2017 - Present      Health Maintenance        Date Due Completion Dates    COLONOSCOPY 12/11/1996 ---    IMM ZOSTER VACCINE 12/11/2006 ---    IMM PNEUMOCOCCAL 65+ (ADULT) LOW/MEDIUM RISK SERIES (2 of 2 - PPSV23) 5/10/2018 5/10/2017    IMM DTaP/Tdap/Td Vaccine (2 - Td) 5/10/2027 5/10/2017            Current Immunizations     13-VALENT PCV PREVNAR 5/10/2017    Influenza Vaccine Quad Inj (Preserved) 11/19/2013    Tdap Vaccine 5/10/2017      Below and/or attached are the medications your provider expects you to take. Review all of your home medications and newly ordered medications with your provider and/or pharmacist. Follow medication instructions as directed by your provider and/or pharmacist. Please keep your medication list with you and share with your provider. Update the information when medications are discontinued, doses are changed, or new medications (including over-the-counter products) are added; and carry medication information at all times in the event of emergency situations     Allergies:  No Known Allergies          Medications  Valid as of: June 06, 2017 -  9:22 AM    Generic Name Brand Name Tablet Size Instructions for use    Lisinopril (Tab) PRINIVIL 10 MG Take 1 Tab by mouth every day.        Melatonin   Take 3 Tabs by mouth at bedtime as needed (For sleep).        Omeprazole (CAPSULE DELAYED RELEASE) PRILOSEC 20 MG Take 1 Cap by mouth as needed (For heartburn).        .                 Medicines prescribed today were sent to:     [id number] - [place] - [address] [place] [address]    Phone: [phone] Fax: [phone]    Open 24 Hours?: No      Medication refill instructions:       If your prescription bottle indicates you have medication refills left, it is not necessary to call your provider’s office. Please contact your pharmacy and they will refill your medication.    If your prescription bottle indicates you do not have any refills left, you may  request refills at any time through one of the following ways: The online [employer] system (except Urgent Care), by calling your provider’s office, or by asking your pharmacy to contact your provider’s office with a refill request. Medication refills are processed only during regular business hours and may not be available until the next business day. Your provider may request additional information or to have a follow-up visit with you prior to refilling your medication.   *Please Note: Medication refills are assigned a new Rx number when refilled electronically. Your pharmacy may indicate that no refills were authorized even though a new prescription for the same medication is available at the pharmacy. Please request the medicine by name with the pharmacy before contacting your provider for a refill.        Your To Do List     Future Labs/Procedures Complete By Expires    BASIC METABOLIC PANEL  As directed 6/7/2018    EKG  As directed 6/6/2018         [employer] Access Code: Activation code not generated  Current [employer] Status: Active

## 2017-06-06 NOTE — ASSESSMENT & PLAN NOTE
EGD 2015 showed Jimeenz esophageous. He is back on omeprazole and he is feeling better. He denies melanotic stool, hematochezia, abdominal pain, unintentional weight loss. He called GI and was told that he needs a repeat EGD and colonoscopy in 5 years. Last colonoscopy 2015

## 2017-06-06 NOTE — ASSESSMENT & PLAN NOTE
He still reports shortness of breath when he walks. He denies shortness of breath when he exercises or when he does yard work. He denies chest pain, nausea, vomiting, palpitation. He denies coughing or wheezing. He reports second hand exposure when he was a kid from his parents. He denies orthopnea or PND. No medardo heart disease

## 2017-06-06 NOTE — ASSESSMENT & PLAN NOTE
JIHAN 05/16/2017. He used to be on NSAIDs at that time. Will reorder lab work for reevaluation. he denies hematuria, dysuria

## 2017-06-07 NOTE — PROGRESS NOTES
Quick Note:    Mahamed Crooks,  I just wanted to let you know that the labs look good. Kidney function stable. We can discuss at your next appointment.   Best,  Kaci Ward M.D.    ______

## 2017-06-12 ENCOUNTER — OFFICE VISIT (OUTPATIENT)
Dept: CARDIOLOGY | Facility: MEDICAL CENTER | Age: 72
End: 2017-06-12
Payer: MEDICARE

## 2017-06-12 VITALS
HEIGHT: 72 IN | DIASTOLIC BLOOD PRESSURE: 74 MMHG | WEIGHT: 244 LBS | HEART RATE: 70 BPM | OXYGEN SATURATION: 93 % | BODY MASS INDEX: 33.05 KG/M2 | SYSTOLIC BLOOD PRESSURE: 126 MMHG

## 2017-06-12 DIAGNOSIS — E66.9 OBESITY (BMI 30-39.9): ICD-10-CM

## 2017-06-12 DIAGNOSIS — I10 ESSENTIAL HYPERTENSION: ICD-10-CM

## 2017-06-12 DIAGNOSIS — Z13.6 SCREENING FOR ISCHEMIC HEART DISEASE: ICD-10-CM

## 2017-06-12 DIAGNOSIS — N18.3 CKD (CHRONIC KIDNEY DISEASE), STAGE 3 (MODERATE): ICD-10-CM

## 2017-06-12 DIAGNOSIS — Z77.22 SECONDHAND SMOKE EXPOSURE: ICD-10-CM

## 2017-06-12 DIAGNOSIS — G47.33 OSA ON CPAP: ICD-10-CM

## 2017-06-12 DIAGNOSIS — R06.09 DOE (DYSPNEA ON EXERTION): ICD-10-CM

## 2017-06-12 DIAGNOSIS — E88.810 METABOLIC SYNDROME X: ICD-10-CM

## 2017-06-12 DIAGNOSIS — R06.02 SOB (SHORTNESS OF BREATH): ICD-10-CM

## 2017-06-12 DIAGNOSIS — R94.31 ABNORMAL EKG: ICD-10-CM

## 2017-06-12 DIAGNOSIS — G47.33 OSA (OBSTRUCTIVE SLEEP APNEA): ICD-10-CM

## 2017-06-12 LAB — EKG IMPRESSION: NORMAL

## 2017-06-12 PROCEDURE — 99204 OFFICE O/P NEW MOD 45 MIN: CPT | Performed by: INTERNAL MEDICINE

## 2017-06-12 PROCEDURE — 93000 ELECTROCARDIOGRAM COMPLETE: CPT | Performed by: INTERNAL MEDICINE

## 2017-06-12 NOTE — MR AVS SNAPSHOT
"        Valeriy Orlando   2017 1:30 PM   Office Visit   MRN: [id number]    Department:  [facility]   Dept Phone:  [phone]    Description:  Male : 1945   Provider:  Marlene Jung MD,[facility]           Reason for Visit     New Patient           Allergies as of 2017     No Known Allergies      You were diagnosed with     SOB (shortness of breath)   [978483]       Essential hypertension   [8856462]       Obesity (BMI 30-39.9)   [743855]       MAVERICK (obstructive sleep apnea)   [276027]       Abnormal EKG   [577191]       Screening for ischemic heart disease   [V81.0.ICD-9-CM]       CKD (chronic kidney disease), stage 3 (moderate)   [9004685]       Metabolic syndrome X   [145048]       GROSSMAN (dyspnea on exertion)   [790460]       Secondhand smoke exposure   [650463]       MAVERICK on CPAP   [722201]         Vital Signs     Blood Pressure Pulse Height Weight Body Mass Index Oxygen Saturation    126/74 mmHg 70 1.829 m (6' 0.01\") 110.678 kg (244 lb) 33.09 kg/m2 93%    Smoking Status                   Never Smoker            Basic Information     Date Of Birth Sex Race Ethnicity Preferred Language    1945 Male White Non- English      Your appointments     Aug 14, 2017  1:30 PM   FOLLOW UP with Marlene Jung MD,[facility] (--)    [address]  [address]  [place] [id number] [phone]            Dec 12, 2017  8:20 AM   Established Patient with Kaci Ward M.D.   [facility] (--)    [address]  [address]  [place] [id number] [phone]           You will be receiving a confirmation call a few days before your appointment from our automated call confirmation system.              Problem List              ICD-10-CM Priority Class Noted - Resolved    Degeneration of lumbar intervertebral disc M51.36   2016 - Present    Essential hypertension I10   5/3/2017 - Present    Prediabetes R73.03   " 5/3/2017 - Present    Gastroesophageal reflux disease without esophagitis K21.9   5/3/2017 - Present    Obesity (BMI 30-39.9) E66.9   5/3/2017 - Present    SOB (shortness of breath) R06.02   5/3/2017 - Present    MVAERICK (obstructive sleep apnea) G47.33   5/3/2017 - Present    JIHAN (acute kidney injury) (CMS-HCC) N17.9   5/3/2017 - Present    Abdominal bloating R14.0   6/6/2017 - Present    Abnormal EKG R94.31   6/6/2017 - Present      Health Maintenance        Date Due Completion Dates    COLONOSCOPY 12/11/1996 ---    IMM ZOSTER VACCINE 12/11/2006 ---    IMM PNEUMOCOCCAL 65+ (ADULT) LOW/MEDIUM RISK SERIES (2 of 2 - PPSV23) 5/10/2018 5/10/2017    IMM DTaP/Tdap/Td Vaccine (2 - Td) 5/10/2027 5/10/2017            Results       Current Immunizations     13-VALENT PCV PREVNAR 5/10/2017    Influenza Vaccine Quad Inj (Preserved) 11/19/2013    Tdap Vaccine 5/10/2017      Below and/or attached are the medications your provider expects you to take. Review all of your home medications and newly ordered medications with your provider and/or pharmacist. Follow medication instructions as directed by your provider and/or pharmacist. Please keep your medication list with you and share with your provider. Update the information when medications are discontinued, doses are changed, or new medications (including over-the-counter products) are added; and carry medication information at all times in the event of emergency situations     Allergies:  No Known Allergies          Medications  Valid as of: June 12, 2017 -  2:20 PM    Generic Name Brand Name Tablet Size Instructions for use    Aspirin (Tab) aspirin 81 MG Take 81 mg by mouth every day.        Lisinopril (Tab) PRINIVIL 10 MG Take 1 Tab by mouth every day.        Melatonin   Take 3 Tabs by mouth at bedtime as needed (For sleep).        Omeprazole (CAPSULE DELAYED RELEASE) PRILOSEC 20 MG Take 1 Cap by mouth as needed (For heartburn).        .                 Medicines prescribed today  were sent to:     [id number] - [place] - [address] [place] [address]    Phone: [phone] Fax: [phone]    Open 24 Hours?: No      Medication refill instructions:       If your prescription bottle indicates you have medication refills left, it is not necessary to call your provider’s office. Please contact your pharmacy and they will refill your medication.    If your prescription bottle indicates you do not have any refills left, you may request refills at any time through one of the following ways: The online [employer] system (except Urgent Care), by calling your provider’s office, or by asking your pharmacy to contact your provider’s office with a refill request. Medication refills are processed only during regular business hours and may not be available until the next business day. Your provider may request additional information or to have a follow-up visit with you prior to refilling your medication.   *Please Note: Medication refills are assigned a new Rx number when refilled electronically. Your pharmacy may indicate that no refills were authorized even though a new prescription for the same medication is available at the pharmacy. Please request the medicine by name with the pharmacy before contacting your provider for a refill.           [employer] Access Code: Activation code not generated  Current [employer] Status: Active

## 2017-06-12 NOTE — PROGRESS NOTES
Cardiology Consult Note:    Marlene Jung  Date & Time note created:    6/12/2017   2:05 PM       Patient ID:  Name:             Valeriy Orlando   YOB: 1945  Age:                 71 y.o.  male   MRN:               [id number]                                                             Chief Complaint:   Chief Complaint   Patient presents with   • New Patient             History of Present Illness:   Valeriy Orlando has recent increased GROSSMAN; active but snd hand smoking growing up; acknowledges MetSyn-X; MAVERICK on CPAP      Review of Systems:     Constitutional: Denies fevers, Denies weight changes  Eyes: Denies changes in vision, no eye pain  Ears/Nose/Throat/Mouth: Denies nasal congestion or sore throat   Cardiovascular: Denies chest pain or palpitations   Respiratory: Denies shortness of breath , Denies cough  Gastrointestinal/Hepatic: Denies abdominal pain, nausea, vomiting, diarrhea, constipation or GI bleeding   Genitourinary: Denies bladder dysfunction, dysuria or frequency  Musculoskeletal/Rheum: Denies  joint pain and swelling   Skin/Breast: Denies rash, denies breast lumps or discharge  Neurological: Denies headache, confusion, memory loss or focal weakness/parasthesias  Psychiatric: denies mood disorder   Endocrine: denies hx of diabetes or thyroid dysfunction  Heme/Oncology/Lymph Nodes: Denies enlarged lymph nodes, denies bruising or known bleeding disorder  Allergic/Immunologic: Denies hx of allergies      All other systems were reviewed and are negative (AMA/CMS criteria)    Past Medical History:   Past Medical History   Diagnosis Date   • Heart burn    • Snoring    • Hepatitis A 2001   • Sleep apnea      cpap in use   • Depression      pre diabetes   • Hypertension    • Prediabetes          Past Surgical History:  Past Surgical History   Procedure Laterality Date   • Other orthopedic surgery  1990     right knee scope   • Lumbar laminectomy diskectomy  11/29/2016     Procedure: LUMBAR  LAMINECTOMY DISKECTOMY L3-5 open laminectomy ;  Surgeon: Bentley Claudio M.D.;  Location: SURGERY [facility];  Service:    • Knee arthroscopy     • Groin exploration       growth       Hospital Medications:    Current outpatient prescriptions:   •  aspirin 81 MG tablet, Take 81 mg by mouth every day., Disp: , Rfl:   •  lisinopril (PRINIVIL) 10 MG Tab, Take 1 Tab by mouth every day., Disp: 90 Tab, Rfl: 3  •  omeprazole (PRILOSEC) 20 MG delayed-release capsule, Take 1 Cap by mouth as needed (For heartburn)., Disp: 90 Cap, Rfl: 3  •  MELATONIN PO, Take 3 Tabs by mouth at bedtime as needed (For sleep)., Disp: , Rfl:     Current Outpatient Medications:  Current Outpatient Prescriptions   Medication Sig Dispense Refill   • aspirin 81 MG tablet Take 81 mg by mouth every day.     • lisinopril (PRINIVIL) 10 MG Tab Take 1 Tab by mouth every day. 90 Tab 3   • omeprazole (PRILOSEC) 20 MG delayed-release capsule Take 1 Cap by mouth as needed (For heartburn). 90 Cap 3   • MELATONIN PO Take 3 Tabs by mouth at bedtime as needed (For sleep).       No current facility-administered medications for this visit.         Medication Allergy/Sensitivities:  No Known Allergies    Family History:    Family History   Problem Relation Age of Onset   • Heart Disease Mother    • Cancer Mother      lung cancer   • Heart Disease Maternal Grandmother    • Diabetes Paternal Grandfather        Social History:  Social History     Social History   • Marital Status: Single     Spouse Name: N/A   • Number of Children: N/A   • Years of Education: N/A     Occupational History   • Not on file.     Social History Main Topics   • Smoking status: Never Smoker    • Smokeless tobacco: Never Used   • Alcohol Use: Yes      Comment: 25 per month/ 1 glass wine per day   • Drug Use: No   • Sexual Activity:     Partners: Female     Other Topics Concern   • Not on file     Social History Narrative         Physical Exam:  Vitals  Weight/BMI: Body mass index is 33.09  "kg/(m^2).  Blood pressure 126/74, pulse 70, height 1.829 m (6' 0.01\"), weight 110.678 kg (244 lb), SpO2 93 %.  Filed Vitals:    06/12/17 1324   BP: 126/74   Pulse: 70   Height: 1.829 m (6' 0.01\")   Weight: 110.678 kg (244 lb)   SpO2: 93%     Oxygen Therapy:  Pulse Oximetry: 93 %  General Appearance:  Obese;  Well developed, Well nourished, No acute distress, Non-toxic appearance.   HENT:  Normocephalic, Atraumatic, Oropharynx moist mucous membranes, Dentition: Mallampati 4 OP, Nose normal.    Eyes:  PERRLA, EOMI, Conjunctiva normal, No discharge.  Neck:  Normal range of motion, No cervical tenderness, Supple, No stridor, no JVD .  No thyromegaly.  No carotid bruit.  Cardiovascular:  Normal heart rate, Normal rhythm,  S1, S2, no S3,  S4; No gallops; No murmurs, No rubs, .   Extremitites with intact distal pulses, no cyanosis, clubbing or edema.  No heaves, thrills, HJR;  Peripheral pulses: carotid 2+, brachial 2+, radial 2+, ulnar 2+, femoral 2+, popliteal 2+, PT 2+, DP 2+;  Lungs:  Respiratory effort is normal. Normal breath sounds, breath sounds clear to auscultation bilaterally,  no rales, no rhonchi, no wheezing.   Abdomen: Bowel sounds normal, Soft, No tenderness, No guarding, No rebound, No masses, No hepatosplenomegaly.  Skin: Warm, Dry, No erythema, No rash, no induration or crepitus.  Neurologic: Alert & oriented x 3, Normal motor function, Normal sensory function, No focal deficits noted, cranial nerves II through XII are normal, ab normal gait.  Psychiatric: Affect normal, Judgment normal, Mood normal.      Data Review:     Records reviewed and summarized in current documentation    Lab Data Review:  Lab Results   Component Value Date/Time    SODIUM 139 06/06/2017 09:38 AM    POTASSIUM 5.0 06/06/2017 09:38 AM    CHLORIDE 107 06/06/2017 09:38 AM    CO2 26 06/06/2017 09:38 AM    GLUCOSE 107* 06/06/2017 09:38 AM    BUN 30* 06/06/2017 09:38 AM    CREATININE 1.35 06/06/2017 09:38 AM      Lab Results   Component " Value Date/Time    PT 12.5 11/23/2016 08:11 AM    INR 0.91 11/23/2016 08:11 AM      Lab Results   Component Value Date/Time    WBC 5.2 11/23/2016 08:11 AM    RBC 4.43* 11/23/2016 08:11 AM    HEMOGLOBIN 14.6 11/23/2016 08:11 AM    HEMATOCRIT 43.0 11/23/2016 08:11 AM    MCV 97.1 11/23/2016 08:11 AM    MCH 33.0 11/23/2016 08:11 AM    MCHC 34.0 11/23/2016 08:11 AM    MPV 9.3 11/23/2016 08:11 AM    NEUTROPHILS-POLYS 53.00 11/23/2016 08:11 AM    LYMPHOCYTES 28.30 11/23/2016 08:11 AM    MONOCYTES 8.10 11/23/2016 08:11 AM    EOSINOPHILS 9.40* 11/23/2016 08:11 AM    BASOPHILS 1.00 11/23/2016 08:11 AM        Imaging/Procedures Review:    To my review shows:na    EKG To my review shows:SR 66 bpm, QTc 394 msec    Assessment and Plan.   71 y.o. male has increased GROSSMAN, smking exposure and CV risks and pls see orders for eval; Concerns about his cardiac status  Long discussion about MAVERICK and cardiac effect;    1. SOB (shortness of breath)    - EKG  - PULMONARY FUNCTION TESTS Test requested: Complete Pulmonary Function Test    2. Essential hypertension  controlled    3. Obesity (BMI 30-39.9)    - LIPID PANEL    4. MAVERICK (obstructive sleep apnea)  On CPAP    5. Abnormal EKG  reviewed    6. Screening for ischemic heart disease    - LIPID PANEL  - CBC WITHOUT DIFFERENTIAL    7. CKD (chronic kidney disease), stage 3 (moderate)  reviewed    8. Metabolic syndrome X  A1c 5.9%  - LIPID PANEL    9. GROSSMAN (dyspnea on exertion)    - PULMONARY FUNCTION TESTS Test requested: Complete Pulmonary Function Test    10. Secondhand smoke exposure  Discussed risks and educated about the subject related matters        Return to clinic in  2 months  1. SOB (shortness of breath)  EKG    PULMONARY FUNCTION TESTS Test requested: Complete Pulmonary Function Test   2. Essential hypertension     3. Obesity (BMI 30-39.9)  LIPID PANEL   4. MAVERICK (obstructive sleep apnea)     5. Abnormal EKG     6. Screening for ischemic heart disease  LIPID PANEL    CBC WITHOUT  DIFFERENTIAL   7. CKD (chronic kidney disease), stage 3 (moderate)     8. Metabolic syndrome X  LIPID PANEL   9. GROSSMAN (dyspnea on exertion)  PULMONARY FUNCTION TESTS Test requested: Complete Pulmonary Function Test   10. Secondhand smoke exposure

## 2017-06-12 NOTE — Clinical Note
[facility]   [address]   [address]  [place] [address]  Phone: [phone]  Fax: [phone]              Valeriy Orlando  1945    Encounter Date: 6/12/2017    Kaci Ward M.D.    Thank you for the referral. I had the pleasure of seeing Valeriy Orlando today in cardiology clinic. I've attached my visit note below. If you have any questions please feel free to give me a call anytime.      Marlene Jung MD, PhD, [facility]  Cardiology and Lipidology  [facility]                                                                  PROGRESS NOTE:  Cardiology Consult Note:    Marlene Jung  Date & Time note created:    6/12/2017   2:05 PM       Patient ID:  Name:             Valeriy Orlando   YOB: 1945  Age:                 71 y.o.  male   MRN:               [id number]                                                             Chief Complaint:   Chief Complaint   Patient presents with   • New Patient             History of Present Illness:   Valeriy Orlando has recent increased GROSSMAN; active but snd hand smoking growing up; acknowledges MetSyn-X; MAVERICK on CPAP      Review of Systems:     Constitutional: Denies fevers, Denies weight changes  Eyes: Denies changes in vision, no eye pain  Ears/Nose/Throat/Mouth: Denies nasal congestion or sore throat   Cardiovascular: Denies chest pain or palpitations   Respiratory: Denies shortness of breath , Denies cough  Gastrointestinal/Hepatic: Denies abdominal pain, nausea, vomiting, diarrhea, constipation or GI bleeding   Genitourinary: Denies bladder dysfunction, dysuria or frequency  Musculoskeletal/Rheum: Denies  joint pain and swelling   Skin/Breast: Denies rash, denies breast lumps or discharge  Neurological: Denies headache, confusion, memory loss or focal weakness/parasthesias  Psychiatric: denies mood disorder   Endocrine: denies hx of diabetes or  thyroid dysfunction  Heme/Oncology/Lymph Nodes: Denies enlarged lymph nodes, denies bruising or known bleeding disorder  Allergic/Immunologic: Denies hx of allergies      All other systems were reviewed and are negative (AMA/CMS criteria)    Past Medical History:   Past Medical History   Diagnosis Date   • Heart burn    • Snoring    • Hepatitis A 2001   • Sleep apnea      cpap in use   • Depression      pre diabetes   • Hypertension    • Prediabetes          Past Surgical History:  Past Surgical History   Procedure Laterality Date   • Other orthopedic surgery  1990     right knee scope   • Lumbar laminectomy diskectomy  11/29/2016     Procedure: LUMBAR LAMINECTOMY DISKECTOMY L3-5 open laminectomy ;  Surgeon: Bentley Claudio M.D.;  Location: SURGERY [facility];  Service:    • Knee arthroscopy     • Groin exploration       growth       Hospital Medications:    Current outpatient prescriptions:   •  aspirin 81 MG tablet, Take 81 mg by mouth every day., Disp: , Rfl:   •  lisinopril (PRINIVIL) 10 MG Tab, Take 1 Tab by mouth every day., Disp: 90 Tab, Rfl: 3  •  omeprazole (PRILOSEC) 20 MG delayed-release capsule, Take 1 Cap by mouth as needed (For heartburn)., Disp: 90 Cap, Rfl: 3  •  MELATONIN PO, Take 3 Tabs by mouth at bedtime as needed (For sleep)., Disp: , Rfl:     Current Outpatient Medications:  Current Outpatient Prescriptions   Medication Sig Dispense Refill   • aspirin 81 MG tablet Take 81 mg by mouth every day.     • lisinopril (PRINIVIL) 10 MG Tab Take 1 Tab by mouth every day. 90 Tab 3   • omeprazole (PRILOSEC) 20 MG delayed-release capsule Take 1 Cap by mouth as needed (For heartburn). 90 Cap 3   • MELATONIN PO Take 3 Tabs by mouth at bedtime as needed (For sleep).       No current facility-administered medications for this visit.         Medication Allergy/Sensitivities:  No Known Allergies    Family History:    Family History   Problem Relation Age of Onset   • Heart Disease Mother    • Cancer Mother    "    lung cancer   • Heart Disease Maternal Grandmother    • Diabetes Paternal Grandfather        Social History:  Social History     Social History   • Marital Status: Single     Spouse Name: N/A   • Number of Children: N/A   • Years of Education: N/A     Occupational History   • Not on file.     Social History Main Topics   • Smoking status: Never Smoker    • Smokeless tobacco: Never Used   • Alcohol Use: Yes      Comment: 25 per month/ 1 glass wine per day   • Drug Use: No   • Sexual Activity:     Partners: Female     Other Topics Concern   • Not on file     Social History Narrative         Physical Exam:  Vitals  Weight/BMI: Body mass index is 33.09 kg/(m^2).  Blood pressure 126/74, pulse 70, height 1.829 m (6' 0.01\"), weight 110.678 kg (244 lb), SpO2 93 %.  Filed Vitals:    06/12/17 1324   BP: 126/74   Pulse: 70   Height: 1.829 m (6' 0.01\")   Weight: 110.678 kg (244 lb)   SpO2: 93%     Oxygen Therapy:  Pulse Oximetry: 93 %  General Appearance:  Obese;  Well developed, Well nourished, No acute distress, Non-toxic appearance.   HENT:  Normocephalic, Atraumatic, Oropharynx moist mucous membranes, Dentition: Mallampati 4 OP, Nose normal.    Eyes:  PERRLA, EOMI, Conjunctiva normal, No discharge.  Neck:  Normal range of motion, No cervical tenderness, Supple, No stridor, no JVD .  No thyromegaly.  No carotid bruit.  Cardiovascular:  Normal heart rate, Normal rhythm,  S1, S2, no S3,  S4; No gallops; No murmurs, No rubs, .   Extremitites with intact distal pulses, no cyanosis, clubbing or edema.  No heaves, thrills, HJR;  Peripheral pulses: carotid 2+, brachial 2+, radial 2+, ulnar 2+, femoral 2+, popliteal 2+, PT 2+, DP 2+;  Lungs:  Respiratory effort is normal. Normal breath sounds, breath sounds clear to auscultation bilaterally,  no rales, no rhonchi, no wheezing.   Abdomen: Bowel sounds normal, Soft, No tenderness, No guarding, No rebound, No masses, No hepatosplenomegaly.  Skin: Warm, Dry, No erythema, No rash, no " induration or crepitus.  Neurologic: Alert & oriented x 3, Normal motor function, Normal sensory function, No focal deficits noted, cranial nerves II through XII are normal, ab normal gait.  Psychiatric: Affect normal, Judgment normal, Mood normal.      Data Review:     Records reviewed and summarized in current documentation    Lab Data Review:  Lab Results   Component Value Date/Time    SODIUM 139 06/06/2017 09:38 AM    POTASSIUM 5.0 06/06/2017 09:38 AM    CHLORIDE 107 06/06/2017 09:38 AM    CO2 26 06/06/2017 09:38 AM    GLUCOSE 107* 06/06/2017 09:38 AM    BUN 30* 06/06/2017 09:38 AM    CREATININE 1.35 06/06/2017 09:38 AM      Lab Results   Component Value Date/Time    PT 12.5 11/23/2016 08:11 AM    INR 0.91 11/23/2016 08:11 AM      Lab Results   Component Value Date/Time    WBC 5.2 11/23/2016 08:11 AM    RBC 4.43* 11/23/2016 08:11 AM    HEMOGLOBIN 14.6 11/23/2016 08:11 AM    HEMATOCRIT 43.0 11/23/2016 08:11 AM    MCV 97.1 11/23/2016 08:11 AM    MCH 33.0 11/23/2016 08:11 AM    MCHC 34.0 11/23/2016 08:11 AM    MPV 9.3 11/23/2016 08:11 AM    NEUTROPHILS-POLYS 53.00 11/23/2016 08:11 AM    LYMPHOCYTES 28.30 11/23/2016 08:11 AM    MONOCYTES 8.10 11/23/2016 08:11 AM    EOSINOPHILS 9.40* 11/23/2016 08:11 AM    BASOPHILS 1.00 11/23/2016 08:11 AM        Imaging/Procedures Review:    To my review shows:na    EKG To my review shows:SR 66 bpm, QTc 394 msec    Assessment and Plan.   71 y.o. male has increased GROSSMAN, smking exposure and CV risks and pls see orders for eval; Concerns about his cardiac status  Long discussion about MAVERICK and cardiac effect;    1. SOB (shortness of breath)    - EKG  - PULMONARY FUNCTION TESTS Test requested: Complete Pulmonary Function Test    2. Essential hypertension  controlled    3. Obesity (BMI 30-39.9)    - LIPID PANEL    4. MAVERICK (obstructive sleep apnea)  On CPAP    5. Abnormal EKG  reviewed    6. Screening for ischemic heart disease    - LIPID PANEL  - CBC WITHOUT DIFFERENTIAL    7. CKD  (chronic kidney disease), stage 3 (moderate)  reviewed    8. Metabolic syndrome X  A1c 5.9%  - LIPID PANEL    9. GROSSMAN (dyspnea on exertion)    - PULMONARY FUNCTION TESTS Test requested: Complete Pulmonary Function Test    10. Secondhand smoke exposure  Discussed risks and educated about the subject related matters        Return to clinic in  2 months  1. SOB (shortness of breath)  EKG    PULMONARY FUNCTION TESTS Test requested: Complete Pulmonary Function Test   2. Essential hypertension     3. Obesity (BMI 30-39.9)  LIPID PANEL   4. MAVERICK (obstructive sleep apnea)     5. Abnormal EKG     6. Screening for ischemic heart disease  LIPID PANEL    CBC WITHOUT DIFFERENTIAL   7. CKD (chronic kidney disease), stage 3 (moderate)     8. Metabolic syndrome X  LIPID PANEL   9. GROSSMAN (dyspnea on exertion)  PULMONARY FUNCTION TESTS Test requested: Complete Pulmonary Function Test   10. Secondhand smoke exposure           Kaci Ward M.D.  [address]  [place] [phone]  VIA In Basket

## 2017-06-23 ENCOUNTER — OFFICE VISIT (OUTPATIENT)
Dept: MEDICAL GROUP | Facility: PHYSICIAN GROUP | Age: 72
End: 2017-06-23
Payer: MEDICARE

## 2017-06-23 VITALS
BODY MASS INDEX: 33.09 KG/M2 | HEIGHT: 72 IN | SYSTOLIC BLOOD PRESSURE: 116 MMHG | RESPIRATION RATE: 20 BRPM | TEMPERATURE: 98.8 F | HEART RATE: 68 BPM | WEIGHT: 244.27 LBS | DIASTOLIC BLOOD PRESSURE: 68 MMHG | OXYGEN SATURATION: 94 %

## 2017-06-23 DIAGNOSIS — N17.9 AKI (ACUTE KIDNEY INJURY) (HCC): ICD-10-CM

## 2017-06-23 DIAGNOSIS — K52.9 GASTROENTERITIS: Primary | ICD-10-CM

## 2017-06-23 DIAGNOSIS — I10 ESSENTIAL HYPERTENSION: ICD-10-CM

## 2017-06-23 DIAGNOSIS — R06.02 SOB (SHORTNESS OF BREATH): ICD-10-CM

## 2017-06-23 PROCEDURE — 99214 OFFICE O/P EST MOD 30 MIN: CPT | Performed by: INTERNAL MEDICINE

## 2017-06-23 RX ORDER — FLUOROURACIL 50 MG/G
CREAM TOPICAL
Refills: 2 | COMMUNITY
Start: 2017-04-27 | End: 2018-02-14

## 2017-06-23 NOTE — MR AVS SNAPSHOT
"        Valeriy Orlando   2017 2:20 PM   Office Visit   MRN: [id number]    Department:  [facility]   Dept Phone:  [phone]    Description:  Male : 1945   Provider:  Kaci Ward M.D.           Reason for Visit     Abdominal Pain C/o RLQ abd pain x3 days.  PT states he's been having irregular bowel movements since then, yesterday had a dark green stool.       Allergies as of 2017     No Known Allergies      You were diagnosed with     Gastroenteritis   [875601]  -  Primary     SOB (shortness of breath)   [773440]       Essential hypertension   [1773283]         Vital Signs     Blood Pressure Pulse Temperature Respirations Height Weight    116/68 mmHg 68 37.1 °C (98.8 °F) 20 1.829 m (6' 0.01\") 110.8 kg (244 lb 4.3 oz)    Body Mass Index Oxygen Saturation Smoking Status             33.12 kg/m2 94% Never Smoker          Basic Information     Date Of Birth Sex Race Ethnicity Preferred Language    1945 Male White Non- English      Your appointments     2017 12:00 PM   Pulmonary Function Test with PULMONARY FUNCTION LAB   PULMONARY LAB OP [facility] (--)    [address]  [place] [id number] [phone]            Aug 14, 2017  1:30 PM   FOLLOW UP with Marlene Jung MD,[facility] (--)    [address]  [address]  [place] [id number] [phone]            Dec 12, 2017  8:20 AM   Established Patient with Kaci Ward M.D.   [facility] (--)    [address]  [address]  [place] [id number] [phone]           You will be receiving a confirmation call a few days before your appointment from our automated call confirmation system.              Problem List              ICD-10-CM Priority Class Noted - Resolved    Degeneration of lumbar intervertebral disc M51.36   2016 - Present    Essential hypertension I10   5/3/2017 - Present    Prediabetes R73.03   5/3/2017 - Present   " Gastroesophageal reflux disease without esophagitis K21.9   5/3/2017 - Present    Obesity (BMI 30-39.9) E66.9   5/3/2017 - Present    SOB (shortness of breath) R06.02   5/3/2017 - Present    MAVERICK (obstructive sleep apnea) G47.33   5/3/2017 - Present    JIHAN (acute kidney injury) (CMS-[facility]) N17.9   5/3/2017 - Present    Abdominal bloating R14.0   6/6/2017 - Present    Abnormal EKG R94.31   6/6/2017 - Present    Gastroenteritis K52.9   6/23/2017 - Present      Health Maintenance        Date Due Completion Dates    COLONOSCOPY 12/11/1996 ---    IMM ZOSTER VACCINE 12/11/2006 ---    IMM PNEUMOCOCCAL 65+ (ADULT) LOW/MEDIUM RISK SERIES (2 of 2 - PPSV23) 5/10/2018 5/10/2017    IMM DTaP/Tdap/Td Vaccine (2 - Td) 5/10/2027 5/10/2017            Current Immunizations     13-VALENT PCV PREVNAR 5/10/2017    Influenza Vaccine Quad Inj (Preserved) 11/19/2013    Tdap Vaccine 5/10/2017      Below and/or attached are the medications your provider expects you to take. Review all of your home medications and newly ordered medications with your provider and/or pharmacist. Follow medication instructions as directed by your provider and/or pharmacist. Please keep your medication list with you and share with your provider. Update the information when medications are discontinued, doses are changed, or new medications (including over-the-counter products) are added; and carry medication information at all times in the event of emergency situations     Allergies:  No Known Allergies          Medications  Valid as of: June 23, 2017 -  3:09 PM    Generic Name Brand Name Tablet Size Instructions for use    Aspirin (Tab) aspirin 81 MG Take 81 mg by mouth every day.        Fluorouracil (Cream) EFUDEX 5 % APPLY TOPICALLY TO RIGHT TEMPLE 2XS/WEEK OR UNTIL RED&CRUSTY. WASH HANDS IMMED AFTER. AVOID EYES        Lisinopril (Tab) PRINIVIL 10 MG Take 1 Tab by mouth every day.        Melatonin   Take 3 Tabs by mouth at bedtime as needed (For sleep).         Omeprazole (CAPSULE DELAYED RELEASE) PRILOSEC 20 MG Take 1 Cap by mouth as needed (For heartburn).        .                 Medicines prescribed today were sent to:     [id number] - [place] - [address] [place] [address]    Phone: [phone] Fax: [phone]    Open 24 Hours?: No      Medication refill instructions:       If your prescription bottle indicates you have medication refills left, it is not necessary to call your provider’s office. Please contact your pharmacy and they will refill your medication.    If your prescription bottle indicates you do not have any refills left, you may request refills at any time through one of the following ways: The online Sitemasher system (except Urgent Care), by calling your provider’s office, or by asking your pharmacy to contact your provider’s office with a refill request. Medication refills are processed only during regular business hours and may not be available until the next business day. Your provider may request additional information or to have a follow-up visit with you prior to refilling your medication.   *Please Note: Medication refills are assigned a new Rx number when refilled electronically. Your pharmacy may indicate that no refills were authorized even though a new prescription for the same medication is available at the pharmacy. Please request the medicine by name with the pharmacy before contacting your provider for a refill.        Your To Do List     Future Labs/Procedures Complete By Expires    CDIFF BY PCR  As directed 6/24/2018    COMPLETE O&P  As directed 6/24/2018    CRYPTO/GIARDIA RAPID ASSAY  As directed 6/24/2018    CULTURE STOOL  As directed 6/24/2018    STOOL WBC'S  As directed 6/24/2018         Sitemasher Access Code: Activation code not generated  Current Sitemasher Status: Active

## 2017-06-23 NOTE — ASSESSMENT & PLAN NOTE
Patient last 4 days was irregular, ntermittent diarrhea and constipation. He denies fever,  melanotic stools. started after he eat a chicken steak land. He is eating yougurt, drinking plenty of fluid. He describes as 2-3 bowel movements a day , soft like, greenish. Sometimes are associated with cramping abdominal pain. No recent antibiotic use, or hospitalization. He feels more an urge to defecate if he would eat anything

## 2017-06-26 NOTE — PROGRESS NOTES
Subjective:   Valeriy Orlando is a 71 y.o. male here today for diarrhea, hypertension, follow up from cardiology     Gastroenteritis  Patient last 4 days was irregular, ntermittent diarrhea and constipation. He denies fever,  melanotic stools. started after he eat a chicken steak land. He is eating yougurt, drinking plenty of fluid. He describes as 2-3 bowel movements a day , soft like, greenish. Sometimes are associated with cramping abdominal pain. No recent antibiotic use, or hospitalization. He feels more an urge to defecate if he would eat anything     Essential hypertension  Well-controlled on lisinopril 10 mg daily. He denies headache, chest pain, blurry vision, palpitation, leg swelling      SOB (shortness of breath)  He still continues to feel sob when he walks, he has no sob when he excersises or does yard work. Never smoked. EKG shows Q waves on inferior leads and poor R progression. He was seeing by cardiology, who had referred pt for PFTs. No stress test at this point?? No unstable angina     JIHAN (acute kidney injury) (CMS-HCC)  Possible due to NSAIDs. Continue to monitor. No proteinuria.          Current medicines (including changes today)  Current Outpatient Prescriptions   Medication Sig Dispense Refill   • fluorouracil (EFUDEX) 5 % cream APPLY TOPICALLY TO RIGHT TEMPLE 2XS/WEEK OR UNTIL RED&CRUSTY. WASH HANDS IMMED AFTER. AVOID EYES  2   • aspirin 81 MG tablet Take 81 mg by mouth every day.     • lisinopril (PRINIVIL) 10 MG Tab Take 1 Tab by mouth every day. 90 Tab 3   • omeprazole (PRILOSEC) 20 MG delayed-release capsule Take 1 Cap by mouth as needed (For heartburn). 90 Cap 3   • MELATONIN PO Take 3 Tabs by mouth at bedtime as needed (For sleep).       No current facility-administered medications for this visit.     He  has a past medical history of Heart burn; Snoring; Hepatitis A (2001); Sleep apnea; Depression; Hypertension; and Prediabetes.    Current Outpatient Prescriptions   Medication Sig  "Dispense Refill   • fluorouracil (EFUDEX) 5 % cream APPLY TOPICALLY TO RIGHT TEMPLE 2XS/WEEK OR UNTIL RED&CRUSTY. WASH HANDS IMMED AFTER. AVOID EYES  2   • aspirin 81 MG tablet Take 81 mg by mouth every day.     • lisinopril (PRINIVIL) 10 MG Tab Take 1 Tab by mouth every day. 90 Tab 3   • omeprazole (PRILOSEC) 20 MG delayed-release capsule Take 1 Cap by mouth as needed (For heartburn). 90 Cap 3   • MELATONIN PO Take 3 Tabs by mouth at bedtime as needed (For sleep).       No current facility-administered medications for this visit.       Allergies as of 06/23/2017   • (No Known Allergies)       Social History     Social History   • Marital Status: Single     Spouse Name: N/A   • Number of Children: N/A   • Years of Education: N/A     Occupational History   • Not on file.     Social History Main Topics   • Smoking status: Never Smoker    • Smokeless tobacco: Never Used   • Alcohol Use: Yes      Comment: 25 per month/ 1 glass wine per day   • Drug Use: No   • Sexual Activity:     Partners: Female     Other Topics Concern   • Not on file     Social History Narrative        Family History   Problem Relation Age of Onset   • Heart Disease Mother    • Cancer Mother      lung cancer   • Heart Disease Maternal Grandmother    • Diabetes Paternal Grandfather        Past Surgical History   Procedure Laterality Date   • Other orthopedic surgery  1990     right knee scope   • Lumbar laminectomy diskectomy  11/29/2016     Procedure: LUMBAR LAMINECTOMY DISKECTOMY L3-5 open laminectomy ;  Surgeon: Bentley Claudio M.D.;  Location: SURGERY [facility];  Service:    • Knee arthroscopy     • Groin exploration       growth       ROS   All systems reviewed are negative except for HPI         Objective:     Blood pressure 116/68, pulse 68, temperature 37.1 °C (98.8 °F), resp. rate 20, height 1.829 m (6' 0.01\"), weight 110.8 kg (244 lb 4.3 oz), SpO2 94 %. Body mass index is 33.12 kg/(m^2).  Physical Exam:  Constitutional: Alert, no " distress.  Skin: Warm, dry, good turgor, no rashes in visible areas.  Eye: Equal, round and reactive, conjunctiva clear, lids normal.  ENMT: Lips without lesions, good dentition, oropharynx clear.  Neck: Trachea midline, no masses, no thyromegaly. No cervical or supraclavicular lymphadenopathy  Respiratory: Unlabored respiratory effort, lungs clear to auscultation, no wheezes, no ronchi.  Cardiovascular: Normal S1, S2, no murmur, no edema.  Abdomen: Soft, non-tender, no masses, no hepatosplenomegaly.  Psych: Alert and oriented x3, normal affect and mood.        Assessment and Plan:   The following treatment plan was discussed    1. Gastroenteritis  Likely gastroentereritis. Well hydration, BRAT diet. I advised pt to go to ER if abdominal pain is worse, nausea, vomiting, fever, melanotic or hematochezia   Stool studies to follow   - SALMONELLA/SHIGELLA SCREEN  - CDIFF BY PCR; Future  - COMPLETE O&P; Future  - CULTURE STOOL; Future  - STOOL WBC'S; Future  - E COLI SHIGA TOXIN EIA  - CRYPTO/GIARDIA RAPID ASSAY; Future    2. SOB (shortness of breath)  PFTs to follow. Likely he need a stress test     3. Essential hypertension  Continue same treatment, continue to monitor     4. JIHAN (acute kidney injury) (CMS-HCC)  Continue to monitor, avoid NSAIDs       Followup: Return if symptoms worsen or fail to improve.

## 2017-06-26 NOTE — ASSESSMENT & PLAN NOTE
He still continues to feel sob when he walks, he has no sob when he excersises or does yard work. Never smoked. EKG shows Q waves on inferior leads and poor R progression. He was seeing by cardiology, who had referred pt for PFTs. No stress test at this point?? No unstable angina

## 2017-06-27 ENCOUNTER — HOSPITAL ENCOUNTER (OUTPATIENT)
Dept: OTHER | Facility: MEDICAL CENTER | Age: 72
End: 2017-06-27
Attending: INTERNAL MEDICINE
Payer: MEDICARE

## 2017-06-27 ENCOUNTER — HOSPITAL ENCOUNTER (OUTPATIENT)
Dept: LAB | Facility: MEDICAL CENTER | Age: 72
End: 2017-06-27
Attending: INTERNAL MEDICINE
Payer: MEDICARE

## 2017-06-27 ENCOUNTER — HOSPITAL ENCOUNTER (OUTPATIENT)
Facility: MEDICAL CENTER | Age: 72
End: 2017-06-27
Attending: INTERNAL MEDICINE
Payer: MEDICARE

## 2017-06-27 DIAGNOSIS — R06.09 DOE (DYSPNEA ON EXERTION): ICD-10-CM

## 2017-06-27 DIAGNOSIS — K52.9 GASTROENTERITIS: ICD-10-CM

## 2017-06-27 DIAGNOSIS — R06.02 SOB (SHORTNESS OF BREATH): ICD-10-CM

## 2017-06-27 LAB
BASOPHILS # BLD AUTO: 1.3 % (ref 0–1.8)
BASOPHILS # BLD: 0.08 K/UL (ref 0–0.12)
CHOLEST SERPL-MCNC: 170 MG/DL (ref 100–199)
EOSINOPHIL # BLD AUTO: 0.61 K/UL (ref 0–0.51)
EOSINOPHIL NFR BLD: 10.1 % (ref 0–6.9)
ERYTHROCYTE [DISTWIDTH] IN BLOOD BY AUTOMATED COUNT: 53 FL (ref 35.9–50)
HCT VFR BLD AUTO: 45.9 % (ref 42–52)
HDLC SERPL-MCNC: 80 MG/DL
HGB BLD-MCNC: 15.4 G/DL (ref 14–18)
IMM GRANULOCYTES # BLD AUTO: 0.02 K/UL (ref 0–0.11)
IMM GRANULOCYTES NFR BLD AUTO: 0.3 % (ref 0–0.9)
LDLC SERPL CALC-MCNC: 84 MG/DL
LYMPHOCYTES # BLD AUTO: 1.46 K/UL (ref 1–4.8)
LYMPHOCYTES NFR BLD: 24.3 % (ref 22–41)
MCH RBC QN AUTO: 32.8 PG (ref 27–33)
MCHC RBC AUTO-ENTMCNC: 33.6 G/DL (ref 33.7–35.3)
MCV RBC AUTO: 97.7 FL (ref 81.4–97.8)
MONOCYTES # BLD AUTO: 0.39 K/UL (ref 0–0.85)
MONOCYTES NFR BLD AUTO: 6.5 % (ref 0–13.4)
NEUTROPHILS # BLD AUTO: 3.45 K/UL (ref 1.82–7.42)
NEUTROPHILS NFR BLD: 57.5 % (ref 44–72)
NRBC # BLD AUTO: 0 K/UL
NRBC BLD AUTO-RTO: 0 /100 WBC
PLATELET # BLD AUTO: 223 K/UL (ref 164–446)
PMV BLD AUTO: 9.5 FL (ref 9–12.9)
RBC # BLD AUTO: 4.7 M/UL (ref 4.7–6.1)
TRIGL SERPL-MCNC: 31 MG/DL (ref 0–149)
WBC # BLD AUTO: 6 K/UL (ref 4.8–10.8)

## 2017-06-27 PROCEDURE — 87899 AGENT NOS ASSAY W/OPTIC: CPT

## 2017-06-27 PROCEDURE — 87328 CRYPTOSPORIDIUM AG IA: CPT

## 2017-06-27 PROCEDURE — 94729 DIFFUSING CAPACITY: CPT | Mod: 26 | Performed by: INTERNAL MEDICINE

## 2017-06-27 PROCEDURE — 94726 PLETHYSMOGRAPHY LUNG VOLUMES: CPT

## 2017-06-27 PROCEDURE — 89055 LEUKOCYTE ASSESSMENT FECAL: CPT

## 2017-06-27 PROCEDURE — 87046 STOOL CULTR AEROBIC BACT EA: CPT

## 2017-06-27 PROCEDURE — 87324 CLOSTRIDIUM AG IA: CPT

## 2017-06-27 PROCEDURE — 94060 EVALUATION OF WHEEZING: CPT | Mod: 26 | Performed by: INTERNAL MEDICINE

## 2017-06-27 PROCEDURE — 94726 PLETHYSMOGRAPHY LUNG VOLUMES: CPT | Mod: 26 | Performed by: INTERNAL MEDICINE

## 2017-06-27 PROCEDURE — 94729 DIFFUSING CAPACITY: CPT

## 2017-06-27 PROCEDURE — 87329 GIARDIA AG IA: CPT

## 2017-06-27 PROCEDURE — 87493 C DIFF AMPLIFIED PROBE: CPT

## 2017-06-27 PROCEDURE — 94060 EVALUATION OF WHEEZING: CPT

## 2017-06-27 PROCEDURE — 87045 FECES CULTURE AEROBIC BACT: CPT

## 2017-06-27 ASSESSMENT — PULMONARY FUNCTION TESTS
FEV1: 3.98
FVC_PREDICTED: 4.71
FEV1_PERCENT_PREDICTED: 115
FVC: 4.97
FEV1/FVC_PERCENT_CHANGE: 25
FEV1_PERCENT_CHANGE: -1
FEV1/FVC_PERCENT_PREDICTED: 108
FVC_PERCENT_PREDICTED: 106
FEV1_PERCENT_CHANGE: -4
FEV1/FVC: 82.5
FVC: 4.8
FEV1: 3.96
FEV1/FVC: 80
FEV1_PERCENT_PREDICTED: 114
FVC_PERCENT_PREDICTED: 102
FEV1/FVC_PERCENT_PREDICTED: 74
FEV1/FVC_PERCENT_PREDICTED: 112
FEV1_PREDICTED: 3.47

## 2017-06-28 LAB
C DIFF DNA SPEC QL NAA+PROBE: NEGATIVE
C DIFF TOX A+B STL QL IA: NEGATIVE
C DIFF TOX GENS STL QL NAA+PROBE: NEGATIVE
E COLI SXT1+2 STL IA: NORMAL
G LAMBLIA+C PARVUM AG STL QL RAPID: NORMAL
SIGNIFICANT IND 70042: NORMAL
SIGNIFICANT IND 70042: NORMAL
SITE SITE: NORMAL
SITE SITE: NORMAL
SOURCE SOURCE: NORMAL
SOURCE SOURCE: NORMAL
WBC STL QL MICRO: NORMAL

## 2017-06-30 LAB
BACTERIA STL CULT: NORMAL
E COLI SXT1+2 STL IA: NORMAL
SIGNIFICANT IND 70042: NORMAL
SITE SITE: NORMAL
SOURCE SOURCE: NORMAL

## 2017-06-30 NOTE — PROGRESS NOTES
Given diagnoses are dyspnea, sleep apnea hypopnea and hypertension.  Technical comments indicate good effort on the part of the patient.    Spirometry and the flow volume loop suggest normal dynamic airflow with an FEV1 to FVC ratio of 80% and normal midexpiratory flow.  The FEV1 is 3.98 L, or 115% of the predicted value.  There is no change in dynamic air flow after administration of an inhaled bronchodilator.  Maximal voluntary ventilation and vital capacity are within normal limits.    Lung volumes include a normal total lung capacity and residual volume.  Diffusing capacity is above the upper limit of normal.  That finding may be of no clinical significance but occasionally signals polycythemia or congestive heart failure.  Airway resistance measurements are unremarkable.    Assessment:  Normal pulmonary function study except for the isolated increase in diffusing capacity as discussed above.

## 2017-07-05 ENCOUNTER — TELEPHONE (OUTPATIENT)
Dept: MEDICAL GROUP | Facility: MEDICAL CENTER | Age: 72
End: 2017-07-05

## 2017-07-05 NOTE — TELEPHONE ENCOUNTER
Phone Number Called: [phone] (home)     Message: Pt notified of results below.  Per pt, feeling a lot better.     Left Message for patient to call back: no

## 2017-07-05 NOTE — TELEPHONE ENCOUNTER
----- Message from Kaci Ward M.D. sent at 7/5/2017 12:25 PM PDT -----  Can you please let pt knows that stool studies are negative.   Continue well hydration. Likely viral gastroenteritis.   Let me know if he is not feeling better  Thank you,  Kaci Ward M.D.

## 2017-08-14 ENCOUNTER — OFFICE VISIT (OUTPATIENT)
Dept: CARDIOLOGY | Facility: MEDICAL CENTER | Age: 72
End: 2017-08-14
Payer: MEDICARE

## 2017-08-14 VITALS
OXYGEN SATURATION: 91 % | WEIGHT: 244 LBS | HEIGHT: 72 IN | SYSTOLIC BLOOD PRESSURE: 130 MMHG | DIASTOLIC BLOOD PRESSURE: 80 MMHG | HEART RATE: 74 BPM | BODY MASS INDEX: 33.05 KG/M2

## 2017-08-14 DIAGNOSIS — E88.810 METABOLIC SYNDROME X: ICD-10-CM

## 2017-08-14 DIAGNOSIS — E66.9 OBESITY (BMI 30-39.9): ICD-10-CM

## 2017-08-14 DIAGNOSIS — N18.3 CKD (CHRONIC KIDNEY DISEASE), STAGE 3 (MODERATE): ICD-10-CM

## 2017-08-14 DIAGNOSIS — I10 ESSENTIAL HYPERTENSION: ICD-10-CM

## 2017-08-14 DIAGNOSIS — G47.33 OSA ON CPAP: ICD-10-CM

## 2017-08-14 DIAGNOSIS — R06.02 SOB (SHORTNESS OF BREATH): ICD-10-CM

## 2017-08-14 DIAGNOSIS — Z77.22 SECONDHAND SMOKE EXPOSURE: ICD-10-CM

## 2017-08-14 PROCEDURE — 99214 OFFICE O/P EST MOD 30 MIN: CPT | Performed by: INTERNAL MEDICINE

## 2017-08-14 NOTE — Clinical Note
[facility]   [address]   [address]  [place] [address]  Phone: [phone]  Fax: [phone]              Valeriy Orlando  1945    Encounter Date: 8/14/2017    Kaci Ward M.D.    Thank you for the referral. I had the pleasure of seeing Valeriy Orlando today in cardiology clinic. I've attached my visit note below. If you have any questions please feel free to give me a call anytime.      Marlene Jung MD, PhD, [facility]  Cardiology and Lipidology  [facility]                                                                    PROGRESS NOTE:  Chief Complaint   Patient presents with   • Follow-Up     GROSSMAN    This patient is an established male who is here today to discuss: Follow up  recent increased GROSSMAN; active but snd hand smoking growing up; acknowledges MetSyn-X; MAVERICK on CPAP; No new compaints; GROSSMAN on prolong walking but not on his bike. Back is better. Overall feels better and very active;     Patient Active Problem List    Diagnosis Date Noted   • Gastroenteritis 06/23/2017   • Abdominal bloating 06/06/2017   • Abnormal EKG 06/06/2017   • Essential hypertension 05/03/2017   • Prediabetes 05/03/2017   • Gastroesophageal reflux disease without esophagitis 05/03/2017   • Obesity (BMI 30-39.9) 05/03/2017   • SOB (shortness of breath) 05/03/2017   • MAVERICK (obstructive sleep apnea) 05/03/2017   • JIHAN (acute kidney injury) (CMS-[facility]) 05/03/2017   • Degeneration of lumbar intervertebral disc 11/29/2016       Past Medical History   Diagnosis Date   • Heart burn    • Snoring    • Hepatitis A 2001   • Sleep apnea      cpap in use   • Depression      pre diabetes   • Hypertension    • Prediabetes      Past Surgical History   Procedure Laterality Date   • Other orthopedic surgery  1990     right knee scope   • Lumbar laminectomy diskectomy  11/29/2016     Procedure: LUMBAR LAMINECTOMY DISKECTOMY L3-5 open  laminectomy ;  Surgeon: Bentley Claudio M.D.;  Location: SURGERY [facility];  Service:    • Knee arthroscopy     • Groin exploration       growth     Social History     Social History   • Marital Status: Single     Spouse Name: N/A   • Number of Children: N/A   • Years of Education: N/A     Social History Main Topics   • Smoking status: Never Smoker    • Smokeless tobacco: Never Used   • Alcohol Use: Yes      Comment: 25 per month/ 1 glass wine per day   • Drug Use: No   • Sexual Activity:     Partners: Female     Other Topics Concern   • None     Social History Narrative     Family History   Problem Relation Age of Onset   • Heart Disease Mother    • Cancer Mother      lung cancer   • Heart Disease Maternal Grandmother    • Diabetes Paternal Grandfather        Current Outpatient Prescriptions   Medication Sig Dispense Refill   • aspirin 81 MG tablet Take 81 mg by mouth every day.     • lisinopril (PRINIVIL) 10 MG Tab Take 1 Tab by mouth every day. 90 Tab 3   • omeprazole (PRILOSEC) 20 MG delayed-release capsule Take 1 Cap by mouth as needed (For heartburn). 90 Cap 3   • MELATONIN PO Take 3 Tabs by mouth at bedtime as needed (For sleep).     • fluorouracil (EFUDEX) 5 % cream APPLY TOPICALLY TO RIGHT TEMPLE 2XS/WEEK OR UNTIL RED&CRUSTY. WASH HANDS IMMED AFTER. AVOID EYES  2     No current facility-administered medications for this visit.     Review of patient's allergies indicates no known allergies.    Review of Systems:     Constitutional: Denies fevers, Denies weight changes  Eyes: Denies changes in vision, no eye pain  Ears/Nose/Throat/Mouth: Denies nasal congestion or sore throat   Cardiovascular: Denies chest pain or palpitations   Respiratory: Denies shortness of breath , Denies cough  Gastrointestinal/Hepatic: Denies abdominal pain, nausea, vomiting, diarrhea, constipation or GI bleeding   Genitourinary: Denies bladder dysfunction, dysuria or frequency  Musculoskeletal/Rheum: Denies  joint pain and  "swelling   Skin/Breast: Denies rash, denies breast lumps or discharge  Neurological: Denies headache, confusion, memory loss or focal weakness/parasthesias  Psychiatric: denies mood disorder   Endocrine: denies hx of diabetes or thyroid dysfunction  Heme/Oncology/Lymph Nodes: Denies enlarged lymph nodes, denies brusing or known bleeding disorder  Allergic/Immunologic: Denies hx of allergies      All other systems were reviewed and are negative (AMA/CMS criteria)      Blood pressure 130/80, pulse 74, height 1.829 m (6' 0.01\"), weight 110.678 kg (244 lb), SpO2 91 %.  General Appearance:  Obese;  Well developed, Well nourished, No acute distress, Non-toxic appearance.   HENT:  Normocephalic, Atraumatic, Oropharynx moist mucous membranes, Dentition: Mallampati 4 OP, Nose normal.    Eyes:  PERRLA, EOMI, Conjunctiva normal, No discharge.  Neck:  Normal range of motion, No cervical tenderness, Supple, No stridor, no JVD .  No thyromegaly.  No carotid bruit.  Cardiovascular:  Normal heart rate, Normal rhythm,  S1, S2, no S3,  S4; No gallops; No murmurs, No rubs, .   Extremitites with intact distal pulses, no cyanosis, clubbing or edema.  No heaves, thrills, HJR;  Peripheral pulses: carotid 2+, brachial 2+, radial 2+, ulnar 2+, femoral 2+, popliteal 2+, PT 2+, DP 2+;  Lungs:  Respiratory effort is normal. Normal breath sounds, breath sounds clear to auscultation bilaterally,  no rales, no rhonchi, no wheezing.   Abdomen: Bowel sounds normal, Soft, No tenderness, No guarding, No rebound, No masses, No hepatosplenomegaly.  Skin: Warm, Dry, No erythema, No rash, no induration or crepitus.  Neurologic: Alert & oriented x 3, Normal motor function, Normal sensory function, No focal deficits noted, cranial nerves II through XII are normal, ab normal gait.  Psychiatric: Affect normal, Judgment normal, Mood normal.    Results for MICHELL ALANIS (MRN [id number]) as of 8/14/2017 13:59   Ref. Range 6/27/2017 09:00 6/27/2017 10:58   WBC " Latest Ref Range: 4.8-10.8 K/uL  6.0   RBC Latest Ref Range: 4.70-6.10 M/uL  4.70   Hemoglobin Latest Ref Range: 14.0-18.0 g/dL  15.4   Hematocrit Latest Ref Range: 42.0-52.0 %  45.9   MCV Latest Ref Range: 81.4-97.8 fL  97.7   MCH Latest Ref Range: 27.0-33.0 pg  32.8   MCHC Latest Ref Range: 33.7-35.3 g/dL  33.6 (L)   RDW Latest Ref Range: 35.9-50.0 fL  53.0 (H)   Platelet Count Latest Ref Range: 164-446 K/uL  223   MPV Latest Ref Range: 9.0-12.9 fL  9.5   Neutrophils-Polys Latest Ref Range: 44.00-72.00 %  57.50   Neutrophils (Absolute) Latest Ref Range: 1.82-7.42 K/uL  3.45   Lymphocytes Latest Ref Range: 22.00-41.00 %  24.30   Lymphs (Absolute) Latest Ref Range: 1.00-4.80 K/uL  1.46   Monocytes Latest Ref Range: 0.00-13.40 %  6.50   Monos (Absolute) Latest Ref Range: 0.00-0.85 K/uL  0.39   Eosinophils Latest Ref Range: 0.00-6.90 %  10.10 (H)   Eos (Absolute) Latest Ref Range: 0.00-0.51 K/uL  0.61 (H)   Basophils Latest Ref Range: 0.00-1.80 %  1.30   Baso (Absolute) Latest Ref Range: 0.00-0.12 K/uL  0.08   Immature Granulocytes Latest Ref Range: 0.00-0.90 %  0.30   Immature Granulocytes (abs) Latest Ref Range: 0.00-0.11 K/uL  0.02   Nucleated RBC Latest Units: /100 WBC  0.00   NRBC (Absolute) Latest Units: K/uL  0.00   Cholesterol,Tot Latest Ref Range: 100-199 mg/dL  170   Triglycerides Latest Ref Range: 0-149 mg/dL  31   HDL Latest Ref Range: >=40 mg/dL  80   LDL Latest Ref Range: <100 mg/dL  84     Assessment and Plan.   71 y.o. male has sig improvement; Lab reviewed and reassured; Continue to be active ; back problem is better contributed to his overall wellness feel;     1. SOB (shortness of breath)  GROSSMAN; see HPI    2. Essential hypertension  controplled    3. Obesity (BMI 30-39.9)  Stable and his better diet changes discussed;     4. CKD (chronic kidney disease), stage 3 (moderate)  Reviewed, better    5. Secondhand smoke exposure  Discussed risks and educated about the subject related matters      6.  Metabolic syndrome X  Reviewed, PCP follows    7. MAVERICK on CPAP  continue      Return to clinic in  9 , months    1. SOB (shortness of breath)     2. Essential hypertension     3. Obesity (BMI 30-39.9)     4. CKD (chronic kidney disease), stage 3 (moderate)     5. Secondhand smoke exposure     6. Metabolic syndrome X     7. MAVERICK on CPAP               Kaci Ward M.D.  [address]  [place] [address]  VIA In Basket

## 2017-08-14 NOTE — MR AVS SNAPSHOT
"        Valeriy Orlando   2017 1:30 PM   Office Visit   MRN: [id number]    Department:  [facility]   Dept Phone:  [phone]    Description:  Male : 1945   Provider:  Marlene Jung MD,[facility]           Reason for Visit     Follow-Up           Allergies as of 2017     No Known Allergies      You were diagnosed with     SOB (shortness of breath)   [275649]       Essential hypertension   [4827584]       Obesity (BMI 30-39.9)   [629068]       CKD (chronic kidney disease), stage 3 (moderate)   [4896094]       Secondhand smoke exposure   [949579]       Metabolic syndrome X   [795992]       MAVERICK on CPAP   [100821]         Vital Signs     Blood Pressure Pulse Height Weight Body Mass Index Oxygen Saturation    130/80 mmHg 74 1.829 m (6' 0.01\") 110.678 kg (244 lb) 33.09 kg/m2 91%    Smoking Status                   Never Smoker            Basic Information     Date Of Birth Sex Race Ethnicity Preferred Language    1945 Male White Non- English      Your appointments     Dec 12, 2017  8:20 AM   Established Patient with Kaci Ward M.D.   [facility] (--)    [address]  [address]  [facility] [address] [phone]           You will be receiving a confirmation call a few days before your appointment from our automated call confirmation system.              Problem List              ICD-10-CM Priority Class Noted - Resolved    Degeneration of lumbar intervertebral disc M51.36   2016 - Present    Essential hypertension I10   5/3/2017 - Present    Prediabetes R73.03   5/3/2017 - Present    Gastroesophageal reflux disease without esophagitis K21.9   5/3/2017 - Present    Obesity (BMI 30-39.9) E66.9   5/3/2017 - Present    SOB (shortness of breath) R06.02   5/3/2017 - Present    MAVERICK (obstructive sleep apnea) G47.33   5/3/2017 - Present    JIHAN (acute kidney injury) (CMS-HCC) N17.9   5/3/2017 - Present    Abdominal bloating R14.0   2017 - Present    Abnormal EKG " R94.31   6/6/2017 - Present    Gastroenteritis K52.9   6/23/2017 - Present      Health Maintenance        Date Due Completion Dates    COLONOSCOPY 12/11/1996 ---    IMM ZOSTER VACCINE 12/11/2006 ---    IMM INFLUENZA (1) 9/1/2017 11/19/2013    IMM PNEUMOCOCCAL 65+ (ADULT) LOW/MEDIUM RISK SERIES (2 of 2 - PPSV23) 5/10/2018 5/10/2017    IMM DTaP/Tdap/Td Vaccine (2 - Td) 5/10/2027 5/10/2017            Current Immunizations     13-VALENT PCV PREVNAR 5/10/2017    Influenza Vaccine Quad Inj (Preserved) 11/19/2013    Tdap Vaccine 5/10/2017      Below and/or attached are the medications your provider expects you to take. Review all of your home medications and newly ordered medications with your provider and/or pharmacist. Follow medication instructions as directed by your provider and/or pharmacist. Please keep your medication list with you and share with your provider. Update the information when medications are discontinued, doses are changed, or new medications (including over-the-counter products) are added; and carry medication information at all times in the event of emergency situations     Allergies:  No Known Allergies          Medications  Valid as of: August 14, 2017 -  2:05 PM    Generic Name Brand Name Tablet Size Instructions for use    Aspirin (Tab) aspirin 81 MG Take 81 mg by mouth every day.        Fluorouracil (Cream) EFUDEX 5 % APPLY TOPICALLY TO RIGHT TEMPLE 2XS/WEEK OR UNTIL RED&CRUSTY. WASH HANDS IMMED AFTER. AVOID EYES        Lisinopril (Tab) PRINIVIL 10 MG Take 1 Tab by mouth every day.        Melatonin   Take 3 Tabs by mouth at bedtime as needed (For sleep).        Omeprazole (CAPSULE DELAYED RELEASE) PRILOSEC 20 MG Take 1 Cap by mouth as needed (For heartburn).        .                 Medicines prescribed today were sent to:     [facility] #[id number] - [place] - [address] [place] [address]    Phone: [phone] Fax: [phone]    Open 24 Hours?: No      Medication refill  instructions:       If your prescription bottle indicates you have medication refills left, it is not necessary to call your provider’s office. Please contact your pharmacy and they will refill your medication.    If your prescription bottle indicates you do not have any refills left, you may request refills at any time through one of the following ways: The online [employer] system (except Urgent Care), by calling your provider’s office, or by asking your pharmacy to contact your provider’s office with a refill request. Medication refills are processed only during regular business hours and may not be available until the next business day. Your provider may request additional information or to have a follow-up visit with you prior to refilling your medication.   *Please Note: Medication refills are assigned a new Rx number when refilled electronically. Your pharmacy may indicate that no refills were authorized even though a new prescription for the same medication is available at the pharmacy. Please request the medicine by name with the pharmacy before contacting your provider for a refill.           [employer] Access Code: Activation code not generated  Current [employer] Status: Active

## 2017-08-14 NOTE — PROGRESS NOTES
Chief Complaint   Patient presents with   • Follow-Up     GROSSMAN    This patient is an established male who is here today to discuss: Follow up  recent increased GROSSMAN; active but snd hand smoking growing up; acknowledges MetSyn-X; MAVERICK on CPAP; No new compaints; GROSSMAN on prolong walking but not on his bike. Back is better. Overall feels better and very active;     Patient Active Problem List    Diagnosis Date Noted   • Gastroenteritis 06/23/2017   • Abdominal bloating 06/06/2017   • Abnormal EKG 06/06/2017   • Essential hypertension 05/03/2017   • Prediabetes 05/03/2017   • Gastroesophageal reflux disease without esophagitis 05/03/2017   • Obesity (BMI 30-39.9) 05/03/2017   • SOB (shortness of breath) 05/03/2017   • MAVERICK (obstructive sleep apnea) 05/03/2017   • JIHAN (acute kidney injury) (CMS-[facility]) 05/03/2017   • Degeneration of lumbar intervertebral disc 11/29/2016       Past Medical History   Diagnosis Date   • Heart burn    • Snoring    • Hepatitis A 2001   • Sleep apnea      cpap in use   • Depression      pre diabetes   • Hypertension    • Prediabetes      Past Surgical History   Procedure Laterality Date   • Other orthopedic surgery  1990     right knee scope   • Lumbar laminectomy diskectomy  11/29/2016     Procedure: LUMBAR LAMINECTOMY DISKECTOMY L3-5 open laminectomy ;  Surgeon: Bentley Claudio M.D.;  Location: SURGERY [facility];  Service:    • Knee arthroscopy     • Groin exploration       growth     Social History     Social History   • Marital Status: Single     Spouse Name: N/A   • Number of Children: N/A   • Years of Education: N/A     Social History Main Topics   • Smoking status: Never Smoker    • Smokeless tobacco: Never Used   • Alcohol Use: Yes      Comment: 25 per month/ 1 glass wine per day   • Drug Use: No   • Sexual Activity:     Partners: Female     Other Topics Concern   • None     Social History Narrative     Family History   Problem Relation Age of Onset   • Heart Disease Mother    • Cancer  "Mother      lung cancer   • Heart Disease Maternal Grandmother    • Diabetes Paternal Grandfather        Current Outpatient Prescriptions   Medication Sig Dispense Refill   • aspirin 81 MG tablet Take 81 mg by mouth every day.     • lisinopril (PRINIVIL) 10 MG Tab Take 1 Tab by mouth every day. 90 Tab 3   • omeprazole (PRILOSEC) 20 MG delayed-release capsule Take 1 Cap by mouth as needed (For heartburn). 90 Cap 3   • MELATONIN PO Take 3 Tabs by mouth at bedtime as needed (For sleep).     • fluorouracil (EFUDEX) 5 % cream APPLY TOPICALLY TO RIGHT TEMPLE 2XS/WEEK OR UNTIL RED&CRUSTY. WASH HANDS IMMED AFTER. AVOID EYES  2     No current facility-administered medications for this visit.     Review of patient's allergies indicates no known allergies.    Review of Systems:     Constitutional: Denies fevers, Denies weight changes  Eyes: Denies changes in vision, no eye pain  Ears/Nose/Throat/Mouth: Denies nasal congestion or sore throat   Cardiovascular: Denies chest pain or palpitations   Respiratory: Denies shortness of breath , Denies cough  Gastrointestinal/Hepatic: Denies abdominal pain, nausea, vomiting, diarrhea, constipation or GI bleeding   Genitourinary: Denies bladder dysfunction, dysuria or frequency  Musculoskeletal/Rheum: Denies  joint pain and swelling   Skin/Breast: Denies rash, denies breast lumps or discharge  Neurological: Denies headache, confusion, memory loss or focal weakness/parasthesias  Psychiatric: denies mood disorder   Endocrine: denies hx of diabetes or thyroid dysfunction  Heme/Oncology/Lymph Nodes: Denies enlarged lymph nodes, denies brusing or known bleeding disorder  Allergic/Immunologic: Denies hx of allergies      All other systems were reviewed and are negative (AMA/CMS criteria)      Blood pressure 130/80, pulse 74, height 1.829 m (6' 0.01\"), weight 110.678 kg (244 lb), SpO2 91 %.  General Appearance:  Obese;  Well developed, Well nourished, No acute distress, Non-toxic appearance. "   HENT:  Normocephalic, Atraumatic, Oropharynx moist mucous membranes, Dentition: Mallampati 4 OP, Nose normal.    Eyes:  PERRLA, EOMI, Conjunctiva normal, No discharge.  Neck:  Normal range of motion, No cervical tenderness, Supple, No stridor, no JVD .  No thyromegaly.  No carotid bruit.  Cardiovascular:  Normal heart rate, Normal rhythm,  S1, S2, no S3,  S4; No gallops; No murmurs, No rubs, .   Extremitites with intact distal pulses, no cyanosis, clubbing or edema.  No heaves, thrills, HJR;  Peripheral pulses: carotid 2+, brachial 2+, radial 2+, ulnar 2+, femoral 2+, popliteal 2+, PT 2+, DP 2+;  Lungs:  Respiratory effort is normal. Normal breath sounds, breath sounds clear to auscultation bilaterally,  no rales, no rhonchi, no wheezing.   Abdomen: Bowel sounds normal, Soft, No tenderness, No guarding, No rebound, No masses, No hepatosplenomegaly.  Skin: Warm, Dry, No erythema, No rash, no induration or crepitus.  Neurologic: Alert & oriented x 3, Normal motor function, Normal sensory function, No focal deficits noted, cranial nerves II through XII are normal, ab normal gait.  Psychiatric: Affect normal, Judgment normal, Mood normal.    Results for MICHELL ALANIS (MRN [id number]) as of 8/14/2017 13:59   Ref. Range 6/27/2017 09:00 6/27/2017 10:58   WBC Latest Ref Range: 4.8-10.8 K/uL  6.0   RBC Latest Ref Range: 4.70-6.10 M/uL  4.70   Hemoglobin Latest Ref Range: 14.0-18.0 g/dL  15.4   Hematocrit Latest Ref Range: 42.0-52.0 %  45.9   MCV Latest Ref Range: 81.4-97.8 fL  97.7   MCH Latest Ref Range: 27.0-33.0 pg  32.8   MCHC Latest Ref Range: 33.7-35.3 g/dL  33.6 (L)   RDW Latest Ref Range: 35.9-50.0 fL  53.0 (H)   Platelet Count Latest Ref Range: 164-446 K/uL  223   MPV Latest Ref Range: 9.0-12.9 fL  9.5   Neutrophils-Polys Latest Ref Range: 44.00-72.00 %  57.50   Neutrophils (Absolute) Latest Ref Range: 1.82-7.42 K/uL  3.45   Lymphocytes Latest Ref Range: 22.00-41.00 %  24.30   Lymphs (Absolute) Latest Ref Range:  1.00-4.80 K/uL  1.46   Monocytes Latest Ref Range: 0.00-13.40 %  6.50   Monos (Absolute) Latest Ref Range: 0.00-0.85 K/uL  0.39   Eosinophils Latest Ref Range: 0.00-6.90 %  10.10 (H)   Eos (Absolute) Latest Ref Range: 0.00-0.51 K/uL  0.61 (H)   Basophils Latest Ref Range: 0.00-1.80 %  1.30   Baso (Absolute) Latest Ref Range: 0.00-0.12 K/uL  0.08   Immature Granulocytes Latest Ref Range: 0.00-0.90 %  0.30   Immature Granulocytes (abs) Latest Ref Range: 0.00-0.11 K/uL  0.02   Nucleated RBC Latest Units: /100 WBC  0.00   NRBC (Absolute) Latest Units: K/uL  0.00   Cholesterol,Tot Latest Ref Range: 100-199 mg/dL  170   Triglycerides Latest Ref Range: 0-149 mg/dL  31   HDL Latest Ref Range: >=40 mg/dL  80   LDL Latest Ref Range: <100 mg/dL  84     Assessment and Plan.   71 y.o. male has sig improvement; Lab reviewed and reassured; Continue to be active ; back problem is better contributed to his overall wellness feel;     1. SOB (shortness of breath)  GROSSMAN; see HPI    2. Essential hypertension  controplled    3. Obesity (BMI 30-39.9)  Stable and his better diet changes discussed;     4. CKD (chronic kidney disease), stage 3 (moderate)  Reviewed, better    5. Secondhand smoke exposure  Discussed risks and educated about the subject related matters      6. Metabolic syndrome X  Reviewed, PCP follows    7. MAVERICK on CPAP  continue      Return to clinic in  9 , months    1. SOB (shortness of breath)     2. Essential hypertension     3. Obesity (BMI 30-39.9)     4. CKD (chronic kidney disease), stage 3 (moderate)     5. Secondhand smoke exposure     6. Metabolic syndrome X     7. MAVERICK on CPAP

## 2017-09-13 ENCOUNTER — OFFICE VISIT (OUTPATIENT)
Dept: MEDICAL GROUP | Facility: PHYSICIAN GROUP | Age: 72
End: 2017-09-13
Payer: MEDICARE

## 2017-09-13 ENCOUNTER — HOSPITAL ENCOUNTER (OUTPATIENT)
Dept: LAB | Facility: MEDICAL CENTER | Age: 72
End: 2017-09-13
Attending: INTERNAL MEDICINE
Payer: MEDICARE

## 2017-09-13 VITALS
OXYGEN SATURATION: 95 % | SYSTOLIC BLOOD PRESSURE: 124 MMHG | DIASTOLIC BLOOD PRESSURE: 86 MMHG | TEMPERATURE: 98.3 F | HEART RATE: 86 BPM | BODY MASS INDEX: 32.78 KG/M2 | WEIGHT: 242 LBS | RESPIRATION RATE: 16 BRPM | HEIGHT: 72 IN

## 2017-09-13 DIAGNOSIS — N17.9 AKI (ACUTE KIDNEY INJURY) (HCC): ICD-10-CM

## 2017-09-13 DIAGNOSIS — Z23 NEED FOR VACCINATION: ICD-10-CM

## 2017-09-13 DIAGNOSIS — R06.02 SOB (SHORTNESS OF BREATH): ICD-10-CM

## 2017-09-13 DIAGNOSIS — M51.36 DEGENERATION OF LUMBAR INTERVERTEBRAL DISC: ICD-10-CM

## 2017-09-13 DIAGNOSIS — R10.9 FLANK PAIN: ICD-10-CM

## 2017-09-13 DIAGNOSIS — I10 ESSENTIAL HYPERTENSION: ICD-10-CM

## 2017-09-13 DIAGNOSIS — R01.1 HEART MURMUR: ICD-10-CM

## 2017-09-13 DIAGNOSIS — R94.31 ABNORMAL EKG: ICD-10-CM

## 2017-09-13 PROBLEM — K52.9 GASTROENTERITIS: Status: RESOLVED | Noted: 2017-06-23 | Resolved: 2017-09-13

## 2017-09-13 PROBLEM — R14.0 ABDOMINAL BLOATING: Status: RESOLVED | Noted: 2017-06-06 | Resolved: 2017-09-13

## 2017-09-13 LAB
ANION GAP SERPL CALC-SCNC: 3 MMOL/L (ref 0–11.9)
BUN SERPL-MCNC: 23 MG/DL (ref 8–22)
CALCIUM SERPL-MCNC: 9.9 MG/DL (ref 8.5–10.5)
CHLORIDE SERPL-SCNC: 109 MMOL/L (ref 96–112)
CO2 SERPL-SCNC: 24 MMOL/L (ref 20–33)
CREAT SERPL-MCNC: 1.46 MG/DL (ref 0.5–1.4)
GFR SERPL CREATININE-BSD FRML MDRD: 48 ML/MIN/1.73 M 2
GLUCOSE SERPL-MCNC: 156 MG/DL (ref 65–99)
POTASSIUM SERPL-SCNC: 4.2 MMOL/L (ref 3.6–5.5)
SODIUM SERPL-SCNC: 136 MMOL/L (ref 135–145)

## 2017-09-13 PROCEDURE — 36415 COLL VENOUS BLD VENIPUNCTURE: CPT

## 2017-09-13 PROCEDURE — 80048 BASIC METABOLIC PNL TOTAL CA: CPT

## 2017-09-13 PROCEDURE — G0008 ADMIN INFLUENZA VIRUS VAC: HCPCS | Performed by: INTERNAL MEDICINE

## 2017-09-13 PROCEDURE — 99214 OFFICE O/P EST MOD 30 MIN: CPT | Mod: 25 | Performed by: INTERNAL MEDICINE

## 2017-09-13 PROCEDURE — 90662 IIV NO PRSV INCREASED AG IM: CPT | Performed by: INTERNAL MEDICINE

## 2017-09-13 ASSESSMENT — PAIN SCALES - GENERAL: PAINLEVEL: 3=SLIGHT PAIN

## 2017-09-13 NOTE — ASSESSMENT & PLAN NOTE
Patient presented with right lower abdominal pain and back pain that is going on for 3 days. He reports that the pain is mostly when he walks. It is not constant pain, not very bad. He denies diarrhea, hematuria, fever, chest pain, palpitation, dysuria. Tried laxative because he thought he was constipated but it did not got better. he reportedly had similar but more severe pain years ago. He drank plenty of water at that time, a 10 winter doctor the patient was disappeared. He does not recall documented nephrolithiasis before. His last colonoscopy was 3 years ago and was normal

## 2017-09-13 NOTE — ASSESSMENT & PLAN NOTE
He still continues to feel sob when he walks, he has no sob when he excersises or does yard work. Never smoked. EKG shows Q waves on inferior leads and poor R progression

## 2017-09-13 NOTE — ASSESSMENT & PLAN NOTE
Following with cards. No recommendation. I will order stress test. He has no chest pain when he exercise

## 2017-09-14 ENCOUNTER — TELEPHONE (OUTPATIENT)
Dept: MEDICAL GROUP | Facility: PHYSICIAN GROUP | Age: 72
End: 2017-09-14

## 2017-09-14 DIAGNOSIS — R10.9 FLANK PAIN: ICD-10-CM

## 2017-09-14 DIAGNOSIS — N17.9 AKI (ACUTE KIDNEY INJURY) (HCC): ICD-10-CM

## 2017-09-14 NOTE — PROGRESS NOTES
Subjective:   Valeriy Orlando is a 71 y.o. male here today for flank pain, follow up on chronic medical condition     Flank pain  Patient presented with right lower abdominal pain and back pain that is going on for 3 days. He reports that the pain is mostly when he walks. It is not constant pain, not very bad. He denies diarrhea, hematuria, fever, chest pain, palpitation, dysuria. Tried laxative because he thought he was constipated but it did not got better. he reportedly had similar but more severe pain years ago. He drank plenty of water at that time, a 10 winter doctor the patient was disappeared. He does not recall documented nephrolithiasis before. His last colonoscopy was 3 years ago and was normal    Abnormal EKG  Following with cards. No recommendation. I will order stress test. He has no chest pain when he exercise     JIHAN (acute kidney injury) (CMS-[facility])  Will reevaluate. Off NSAID (due to NSAID)    SOB (shortness of breath)  He still continues to feel sob when he walks, he has no sob when he excersises or does yard work. Never smoked. EKG shows Q waves on inferior leads and poor R progression    Degeneration of lumbar intervertebral disc  Doing better, laminectomy 01/2017     Heart murmur  He has a soft systolic murmur, she has sob, will reevaluate        Current medicines (including changes today)  Current Outpatient Prescriptions   Medication Sig Dispense Refill   • fluorouracil (EFUDEX) 5 % cream APPLY TOPICALLY TO RIGHT TEMPLE 2XS/WEEK OR UNTIL RED&CRUSTY. WASH HANDS IMMED AFTER. AVOID EYES  2   • aspirin 81 MG tablet Take 81 mg by mouth every day.     • lisinopril (PRINIVIL) 10 MG Tab Take 1 Tab by mouth every day. 90 Tab 3   • omeprazole (PRILOSEC) 20 MG delayed-release capsule Take 1 Cap by mouth as needed (For heartburn). 90 Cap 3   • MELATONIN PO Take 3 Tabs by mouth at bedtime as needed (For sleep).       No current facility-administered medications for this visit.      He  has a past medical  history of Depression; Heart burn; Hepatitis A (2001); Hypertension; Prediabetes; Sleep apnea; and Snoring.    Current Outpatient Prescriptions   Medication Sig Dispense Refill   • fluorouracil (EFUDEX) 5 % cream APPLY TOPICALLY TO RIGHT TEMPLE 2XS/WEEK OR UNTIL RED&CRUSTY. WASH HANDS IMMED AFTER. AVOID EYES  2   • aspirin 81 MG tablet Take 81 mg by mouth every day.     • lisinopril (PRINIVIL) 10 MG Tab Take 1 Tab by mouth every day. 90 Tab 3   • omeprazole (PRILOSEC) 20 MG delayed-release capsule Take 1 Cap by mouth as needed (For heartburn). 90 Cap 3   • MELATONIN PO Take 3 Tabs by mouth at bedtime as needed (For sleep).       No current facility-administered medications for this visit.        Allergies as of 09/13/2017   • (No Known Allergies)       Social History     Social History   • Marital status: Single     Spouse name: N/A   • Number of children: N/A   • Years of education: N/A     Occupational History   • Not on file.     Social History Main Topics   • Smoking status: Never Smoker   • Smokeless tobacco: Never Used   • Alcohol use Yes      Comment: 25 per month/ 1 glass wine per day   • Drug use: No   • Sexual activity: Yes     Partners: Female     Other Topics Concern   • Not on file     Social History Narrative   • No narrative on file        Family History   Problem Relation Age of Onset   • Heart Disease Mother    • Cancer Mother      lung cancer   • Heart Disease Maternal Grandmother    • Diabetes Paternal Grandfather        Past Surgical History:   Procedure Laterality Date   • LUMBAR LAMINECTOMY DISKECTOMY  11/29/2016    Procedure: LUMBAR LAMINECTOMY DISKECTOMY L3-5 open laminectomy ;  Surgeon: Bentley Claudio M.D.;  Location: SURGERY [facility];  Service:    • OTHER ORTHOPEDIC SURGERY  1990    right knee scope   • GROIN EXPLORATION      growth   • KNEE ARTHROSCOPY         ROS   All systems reviewed are negative except for HPI         Objective:     Blood pressure 124/86, pulse 86, temperature  36.8 °C (98.3 °F), resp. rate 16, height 1.829 m (6'), weight 109.8 kg (242 lb), SpO2 95 %. Body mass index is 32.82 kg/m².   Physical Exam:  Constitutional: Alert, no distress.  Skin: Warm, dry, good turgor, no rashes in visible areas.  Eye: Equal, round and reactive, conjunctiva clear, lids normal.  ENMT: Lips without lesions, good dentition, oropharynx clear.  Neck: Trachea midline, no masses, no thyromegaly. No cervical or supraclavicular lymphadenopathy  Respiratory: Unlabored respiratory effort, lungs clear to auscultation, no wheezes, no ronchi.  Cardiovascular: Normal S1, S2, no murmur, no edema.  Abdomen: Soft, some tenderness on right low abdominal, no masses, no hepatosplenomegaly.  Psych: Alert and oriented x3, normal affect and mood.        Assessment and Plan:   The following treatment plan was discussed    1. SOB (shortness of breath)  2. Abnormal EKG  3. Heart murmur  He does not have significant second hand exposure. He had exposure when he was a child from his mother.   EKG not normal, no comments from cards.   I will order stress test, and echocardiogram to rule out coronary artery disease, vs valve problem vs pulmonary hypertension . Cardiac risk factor obesity , hypertension . No chest pain on exercise   - BASIC METABOLIC PANEL; Future  - NM-CARDIAC STRESS TEST; Future  - ECHOCARDIOGRAM COMP W/O CONT; Future    4. Flank pain  Muscle ache, cannot rule out nephrolithiasis, diverticulosis . No acute findings. I asked pt to call us if he starts to have fever, sever abdominal pain. CT scan for further eval   - CT-ABDOMEN-PELVIS WITH & W/O; Future  - BASIC METABOLIC PANEL; Future    5. Essential hypertension  Reducing treatment. Continue to monitor  - BASIC METABOLIC PANEL; Future    6. Degeneration of lumbar intervertebral disc  Doing better.       7. JIHAN (acute kidney injury) (CMS-HCC)  Continue to monitor     8. Need for vaccination  - INFLUENZA VACCINE, HIGH DOSE (65+ ONLY)      Followup: Return  keep apt as scheduled .

## 2017-09-14 NOTE — TELEPHONE ENCOUNTER
I called pt. GFR is slight worse. I will cancer abdomen CT scan, and order urgent abdominal US of the abdomen   I will also order urine test, to look for other reason of worsening of kidney function. He denies NSAIDs use. Only new restart medication is omeprazole .   Please call sooner apt for US, cancel CT scan, Please call pt back with instruction, and advise him to go to [facility] lab to get urine test done  Thank you,  Kaci Ward M.D.

## 2017-09-15 ENCOUNTER — HOSPITAL ENCOUNTER (OUTPATIENT)
Dept: LAB | Facility: MEDICAL CENTER | Age: 72
End: 2017-09-15
Attending: INTERNAL MEDICINE
Payer: MEDICARE

## 2017-09-15 ENCOUNTER — TELEPHONE (OUTPATIENT)
Dept: MEDICAL GROUP | Facility: PHYSICIAN GROUP | Age: 72
End: 2017-09-15

## 2017-09-15 ENCOUNTER — HOSPITAL ENCOUNTER (OUTPATIENT)
Dept: RADIOLOGY | Facility: MEDICAL CENTER | Age: 72
End: 2017-09-15
Attending: INTERNAL MEDICINE
Payer: MEDICARE

## 2017-09-15 DIAGNOSIS — R10.9 FLANK PAIN: ICD-10-CM

## 2017-09-15 DIAGNOSIS — N17.9 AKI (ACUTE KIDNEY INJURY) (HCC): ICD-10-CM

## 2017-09-15 LAB
APPEARANCE UR: CLEAR
BILIRUB UR QL STRIP.AUTO: NEGATIVE
CHLORIDE UR-SCNC: 92 MMOL/L
COLOR UR: YELLOW
CREAT UR-MCNC: 112.3 MG/DL
CREAT UR-MCNC: 112.5 MG/DL
CULTURE IF INDICATED INDCX: NO UA CULTURE
GLUCOSE UR STRIP.AUTO-MCNC: NEGATIVE MG/DL
KETONES UR STRIP.AUTO-MCNC: NEGATIVE MG/DL
LEUKOCYTE ESTERASE UR QL STRIP.AUTO: NEGATIVE
MICRO URNS: NORMAL
MICROALBUMIN UR-MCNC: 0.8 MG/DL
MICROALBUMIN/CREAT UR: 7 MG/G (ref 0–30)
NITRITE UR QL STRIP.AUTO: NEGATIVE
PH UR STRIP.AUTO: 6.5 [PH]
POTASSIUM UR-SCNC: 76.2 MMOL/L
PROT UR QL STRIP: NEGATIVE MG/DL
RBC UR QL AUTO: NEGATIVE
SODIUM UR-SCNC: 89 MMOL/L
SP GR UR STRIP.AUTO: 1.02
UROBILINOGEN UR STRIP.AUTO-MCNC: 1 MG/DL

## 2017-09-15 PROCEDURE — 82043 UR ALBUMIN QUANTITATIVE: CPT

## 2017-09-15 PROCEDURE — 81003 URINALYSIS AUTO W/O SCOPE: CPT

## 2017-09-15 PROCEDURE — 82436 ASSAY OF URINE CHLORIDE: CPT

## 2017-09-15 PROCEDURE — 89190 NASAL SMEAR FOR EOSINOPHILS: CPT

## 2017-09-15 PROCEDURE — 84300 ASSAY OF URINE SODIUM: CPT

## 2017-09-15 PROCEDURE — 82570 ASSAY OF URINE CREATININE: CPT | Mod: 91

## 2017-09-15 PROCEDURE — 76700 US EXAM ABDOM COMPLETE: CPT

## 2017-09-15 PROCEDURE — 84133 ASSAY OF URINE POTASSIUM: CPT

## 2017-09-15 NOTE — TELEPHONE ENCOUNTER
I discuss report of kidney US with pt. There is possible underlying kidney disease, no stone, no hydronephrosis, no obstruction   Urine test pending   I asked pt to go and get test done asap, to decide if urgent or routine consult for nephology  He told me today that he lied to me, he took ibuprofen for 4 days in a row.   I will call pt back with results   Kaci Ward M.D.

## 2017-09-16 ENCOUNTER — TELEPHONE (OUTPATIENT)
Dept: MEDICAL GROUP | Facility: PHYSICIAN GROUP | Age: 72
End: 2017-09-16

## 2017-09-16 DIAGNOSIS — N17.9 AKI (ACUTE KIDNEY INJURY) (HCC): ICD-10-CM

## 2017-09-16 LAB — EOSINOPHIL URNS QL MICRO: NORMAL

## 2017-09-19 ENCOUNTER — PATIENT MESSAGE (OUTPATIENT)
Dept: MEDICAL GROUP | Facility: PHYSICIAN GROUP | Age: 72
End: 2017-09-19

## 2017-09-19 ENCOUNTER — HOSPITAL ENCOUNTER (OUTPATIENT)
Dept: LAB | Facility: MEDICAL CENTER | Age: 72
End: 2017-09-19
Attending: INTERNAL MEDICINE
Payer: MEDICARE

## 2017-09-19 DIAGNOSIS — N17.9 AKI (ACUTE KIDNEY INJURY) (HCC): ICD-10-CM

## 2017-09-19 LAB
ANION GAP SERPL CALC-SCNC: 10 MMOL/L (ref 0–11.9)
BUN SERPL-MCNC: 29 MG/DL (ref 8–22)
CALCIUM SERPL-MCNC: 10.1 MG/DL (ref 8.5–10.5)
CHLORIDE SERPL-SCNC: 104 MMOL/L (ref 96–112)
CO2 SERPL-SCNC: 22 MMOL/L (ref 20–33)
CREAT SERPL-MCNC: 1.25 MG/DL (ref 0.5–1.4)
GFR SERPL CREATININE-BSD FRML MDRD: 57 ML/MIN/1.73 M 2
GLUCOSE SERPL-MCNC: 117 MG/DL (ref 65–99)
POTASSIUM SERPL-SCNC: 4.5 MMOL/L (ref 3.6–5.5)
SODIUM SERPL-SCNC: 136 MMOL/L (ref 135–145)

## 2017-09-19 PROCEDURE — 80048 BASIC METABOLIC PNL TOTAL CA: CPT

## 2017-09-19 PROCEDURE — 36415 COLL VENOUS BLD VENIPUNCTURE: CPT

## 2017-09-19 NOTE — TELEPHONE ENCOUNTER
Patient called stating he had not heard back about results.  Aware [employer] message was sent to him.  Results reviewed.

## 2017-09-21 ENCOUNTER — PATIENT MESSAGE (OUTPATIENT)
Dept: MEDICAL GROUP | Facility: PHYSICIAN GROUP | Age: 72
End: 2017-09-21

## 2017-09-21 DIAGNOSIS — N17.9 AKI (ACUTE KIDNEY INJURY) (HCC): ICD-10-CM

## 2017-09-21 NOTE — TELEPHONE ENCOUNTER
----- Message from Diana Yu, Med Ass't sent at 9/21/2017  9:35 AM PDT -----  Regarding: FW: Test Result Question  Contact: [phone]      ----- Message -----  From: Valeriy Orlando  Sent: 9/21/2017   8:39 AM  To: Chris Quintanilla Ma  Subject: Test Result Question                               How did the latest blood test go.

## 2017-09-21 NOTE — TELEPHONE ENCOUNTER
Spoke with pt, kidney is doing better. Avoid NSAIDs, drink plenty fluid.   reevaluate at 09/25. He is going for nuclear test 09/27/2017  Kaci Ward M.D.

## 2017-09-27 ENCOUNTER — OFFICE VISIT (OUTPATIENT)
Dept: MEDICAL GROUP | Facility: PHYSICIAN GROUP | Age: 72
End: 2017-09-27
Payer: MEDICARE

## 2017-09-27 ENCOUNTER — APPOINTMENT (OUTPATIENT)
Dept: CARDIOLOGY | Facility: MEDICAL CENTER | Age: 72
End: 2017-09-27
Attending: INTERNAL MEDICINE
Payer: MEDICARE

## 2017-09-27 VITALS
SYSTOLIC BLOOD PRESSURE: 132 MMHG | DIASTOLIC BLOOD PRESSURE: 78 MMHG | HEART RATE: 64 BPM | HEIGHT: 72 IN | TEMPERATURE: 98.4 F | BODY MASS INDEX: 32.64 KG/M2 | OXYGEN SATURATION: 95 % | RESPIRATION RATE: 16 BRPM | WEIGHT: 241 LBS

## 2017-09-27 DIAGNOSIS — Z23 NEED FOR VACCINATION: ICD-10-CM

## 2017-09-27 DIAGNOSIS — R94.31 ABNORMAL EKG: ICD-10-CM

## 2017-09-27 DIAGNOSIS — M51.36 DEGENERATION OF LUMBAR INTERVERTEBRAL DISC: ICD-10-CM

## 2017-09-27 DIAGNOSIS — I10 ESSENTIAL HYPERTENSION: ICD-10-CM

## 2017-09-27 DIAGNOSIS — K21.9 GASTROESOPHAGEAL REFLUX DISEASE WITHOUT ESOPHAGITIS: ICD-10-CM

## 2017-09-27 DIAGNOSIS — R06.02 SOB (SHORTNESS OF BREATH): ICD-10-CM

## 2017-09-27 PROBLEM — N17.9 AKI (ACUTE KIDNEY INJURY) (HCC): Status: RESOLVED | Noted: 2017-05-03 | Resolved: 2017-09-27

## 2017-09-27 PROBLEM — R10.9 FLANK PAIN: Status: RESOLVED | Noted: 2017-09-13 | Resolved: 2017-09-27

## 2017-09-27 PROCEDURE — 99214 OFFICE O/P EST MOD 30 MIN: CPT | Performed by: INTERNAL MEDICINE

## 2017-09-28 NOTE — PROGRESS NOTES
Subjective:   Valeriy Orlando is a 71 y.o. male here today for lab work review, kidney damage, acid reflux     Degeneration of lumbar intervertebral disc  Still continues to have pain on exertion, stretching exercises. Flank pain from previous apt is resolved. No radiation, intermittent pain. Resolves on its own, or by naproxen. JIHAN on previous apt. He is avoiding NSAIds. I advised to call back surgeon, and discuss further. I advised pt to call us back if in pain to discuss other treatment option as gabapentin     Essential hypertension  Well controled. He denies chest pain, shortness of breath, leg swelling, lightheadedness, headache    Abnormal EKG  He canceled stress test because he was having severe acid reflux    Gastroesophageal reflux disease without esophagitis  He presented today complain for acid reflux. He stopped taking omeprazole because he thought kidney damage was due to omeprazole. Since then he has severe acid reflux, burping. I reassured that omeprazole is safe to take. UA negative for protein, eosinophils. FeNa indicative for prerenal. Denies abdominal pain, hematochezia, melanotic stool,  Hematochezia or dysphagia       Current medicines (including changes today)  Current Outpatient Prescriptions   Medication Sig Dispense Refill   • Zoster Vaccine Live (ZOSTAVAX) 37002 UNT/0.65ML Recon Susp Inject 0.65 mL as instructed Once for 1 dose. 0.65 mL 0   • fluorouracil (EFUDEX) 5 % cream APPLY TOPICALLY TO RIGHT TEMPLE 2XS/WEEK OR UNTIL RED&CRUSTY. WASH HANDS IMMED AFTER. AVOID EYES  2   • aspirin 81 MG tablet Take 81 mg by mouth every day.     • lisinopril (PRINIVIL) 10 MG Tab Take 1 Tab by mouth every day. 90 Tab 3   • omeprazole (PRILOSEC) 20 MG delayed-release capsule Take 1 Cap by mouth as needed (For heartburn). 90 Cap 3   • MELATONIN PO Take 3 Tabs by mouth at bedtime as needed (For sleep).       No current facility-administered medications for this visit.      He  has a past medical history of  Depression; Heart burn; Hepatitis A (2001); Hypertension; Prediabetes; Sleep apnea; and Snoring.    Current Outpatient Prescriptions   Medication Sig Dispense Refill   • Zoster Vaccine Live (ZOSTAVAX) 40805 UNT/0.65ML Recon Susp Inject 0.65 mL as instructed Once for 1 dose. 0.65 mL 0   • fluorouracil (EFUDEX) 5 % cream APPLY TOPICALLY TO RIGHT TEMPLE 2XS/WEEK OR UNTIL RED&CRUSTY. WASH HANDS IMMED AFTER. AVOID EYES  2   • aspirin 81 MG tablet Take 81 mg by mouth every day.     • lisinopril (PRINIVIL) 10 MG Tab Take 1 Tab by mouth every day. 90 Tab 3   • omeprazole (PRILOSEC) 20 MG delayed-release capsule Take 1 Cap by mouth as needed (For heartburn). 90 Cap 3   • MELATONIN PO Take 3 Tabs by mouth at bedtime as needed (For sleep).       No current facility-administered medications for this visit.        Allergies as of 09/27/2017   • (No Known Allergies)       Social History     Social History   • Marital status: Single     Spouse name: N/A   • Number of children: N/A   • Years of education: N/A     Occupational History   • Not on file.     Social History Main Topics   • Smoking status: Never Smoker   • Smokeless tobacco: Never Used   • Alcohol use Yes      Comment: 25 per month/ 1 glass wine per day   • Drug use: No   • Sexual activity: Yes     Partners: Female     Other Topics Concern   • Not on file     Social History Narrative   • No narrative on file        Family History   Problem Relation Age of Onset   • Heart Disease Mother    • Cancer Mother      lung cancer   • Heart Disease Maternal Grandmother    • Diabetes Paternal Grandfather        Past Surgical History:   Procedure Laterality Date   • LUMBAR LAMINECTOMY DISKECTOMY  11/29/2016    Procedure: LUMBAR LAMINECTOMY DISKECTOMY L3-5 open laminectomy ;  Surgeon: Bentley Claudio M.D.;  Location: SURGERY [facility];  Service:    • OTHER ORTHOPEDIC SURGERY  1990    right knee scope   • GROIN EXPLORATION      growth   • KNEE ARTHROSCOPY         ROS   All  systems reviewed are negative except for HPI       Objective:     Blood pressure 132/78, pulse 64, temperature 36.9 °C (98.4 °F), resp. rate 16, height 1.829 m (6'), weight 109.3 kg (241 lb), SpO2 95 %. Body mass index is 32.69 kg/m².   Physical Exam:  Constitutional: Alert, no distress.  Skin: Warm, dry, good turgor, no rashes in visible areas.  Eye: Equal, round and reactive, conjunctiva clear, lids normal.  ENMT: Lips without lesions, good dentition, oropharynx clear.  Neck: Trachea midline, no masses, no thyromegaly. No cervical or supraclavicular lymphadenopathy  Respiratory: Unlabored respiratory effort, lungs clear to auscultation, no wheezes, no ronchi.  Cardiovascular: Normal S1, S2, no murmur, no edema.  Abdomen: Soft, non-tender, no masses, no hepatosplenomegaly.  Psych: Alert and oriented x3, normal affect and mood.        Assessment and Plan:   The following treatment plan was discussed      1. JIHAN  2. Gastroesophageal reflux disease without esophagitis  Resolved. Prerenal, likely due to NSAIDs use, dehydration  BUN more elevated then Cr. Encourage to take omeprazole. Likely BUN slight elevated to miscroscopic GI bleed    3. SOB (shortness of breath)  4. Abnormal EKG  Ok to do stress test. I advise to reschule test     5. Degeneration of lumbar intervertebral disc  Encourage patient in no another follow-up appointment if he persists with pain to discuss about chronic pain management. patient follow-up with the surgeon    6. Essential hypertension   continue same treatment. Continue to monitor    7. Need for vaccination    - Zoster Vaccine Live (ZOSTAVAX) 55354 UNT/0.65ML Recon Susp; Inject 0.65 mL as instructed Once for 1 dose.  Dispense: 0.65 mL; Refill: 0        Followup: Return in about 3 months (around 12/27/2017).

## 2017-09-28 NOTE — ASSESSMENT & PLAN NOTE
Well controled. He denies chest pain, shortness of breath, leg swelling, lightheadedness, headache

## 2017-09-28 NOTE — ASSESSMENT & PLAN NOTE
Still continues to have pain on exertion, stretching exercises. Flank pain from previous apt is resolved. No radiation, intermittent pain. Resolves on its own, or by naproxen. JIHAN on previous apt. He is avoiding NSAIds. I advised to call back surgeon, and discuss further. I advised pt to call us back if in pain to discuss other treatment option as gabapentin

## 2017-09-28 NOTE — ASSESSMENT & PLAN NOTE
He presented today complain for acid reflux. He stopped taking omeprazole because he thought kidney damage was due to omeprazole. Since then he has severe acid reflux, burping. I reassured that omeprazole is safe to take. UA negative for protein, eosinophils. FeNa indicative for prerenal. Denies abdominal pain, hematochezia, melanotic stool,  Hematochezia or dysphagia

## 2017-09-30 ENCOUNTER — PATIENT MESSAGE (OUTPATIENT)
Dept: MEDICAL GROUP | Facility: PHYSICIAN GROUP | Age: 72
End: 2017-09-30

## 2017-10-03 ENCOUNTER — TELEPHONE (OUTPATIENT)
Dept: MEDICAL GROUP | Facility: PHYSICIAN GROUP | Age: 72
End: 2017-10-03

## 2017-10-05 ENCOUNTER — APPOINTMENT (OUTPATIENT)
Dept: RADIOLOGY | Facility: MEDICAL CENTER | Age: 72
End: 2017-10-05
Attending: INTERNAL MEDICINE
Payer: MEDICARE

## 2017-10-09 ENCOUNTER — HOSPITAL ENCOUNTER (OUTPATIENT)
Dept: LAB | Facility: MEDICAL CENTER | Age: 72
End: 2017-10-09
Attending: INTERNAL MEDICINE
Payer: MEDICARE

## 2017-10-09 DIAGNOSIS — N17.9 AKI (ACUTE KIDNEY INJURY) (HCC): ICD-10-CM

## 2017-10-09 LAB
ANION GAP SERPL CALC-SCNC: 8 MMOL/L (ref 0–11.9)
BUN SERPL-MCNC: 29 MG/DL (ref 8–22)
CALCIUM SERPL-MCNC: 9.9 MG/DL (ref 8.5–10.5)
CHLORIDE SERPL-SCNC: 106 MMOL/L (ref 96–112)
CO2 SERPL-SCNC: 22 MMOL/L (ref 20–33)
CREAT SERPL-MCNC: 1.12 MG/DL (ref 0.5–1.4)
GFR SERPL CREATININE-BSD FRML MDRD: >60 ML/MIN/1.73 M 2
GLUCOSE SERPL-MCNC: 147 MG/DL (ref 65–99)
POTASSIUM SERPL-SCNC: 4.2 MMOL/L (ref 3.6–5.5)
SODIUM SERPL-SCNC: 136 MMOL/L (ref 135–145)

## 2017-10-09 PROCEDURE — 80048 BASIC METABOLIC PNL TOTAL CA: CPT

## 2017-10-09 PROCEDURE — 36415 COLL VENOUS BLD VENIPUNCTURE: CPT

## 2017-10-10 ENCOUNTER — OFFICE VISIT (OUTPATIENT)
Dept: MEDICAL GROUP | Facility: PHYSICIAN GROUP | Age: 72
End: 2017-10-10
Payer: MEDICARE

## 2017-10-10 VITALS
BODY MASS INDEX: 32.78 KG/M2 | DIASTOLIC BLOOD PRESSURE: 70 MMHG | HEIGHT: 72 IN | HEART RATE: 73 BPM | SYSTOLIC BLOOD PRESSURE: 126 MMHG | WEIGHT: 242 LBS | RESPIRATION RATE: 16 BRPM | TEMPERATURE: 98.1 F | OXYGEN SATURATION: 93 %

## 2017-10-10 DIAGNOSIS — I10 ESSENTIAL HYPERTENSION: ICD-10-CM

## 2017-10-10 DIAGNOSIS — E66.9 OBESITY (BMI 30-39.9): ICD-10-CM

## 2017-10-10 DIAGNOSIS — M51.36 DEGENERATION OF LUMBAR INTERVERTEBRAL DISC: ICD-10-CM

## 2017-10-10 DIAGNOSIS — N17.9 AKI (ACUTE KIDNEY INJURY) (HCC): ICD-10-CM

## 2017-10-10 DIAGNOSIS — R06.02 SOB (SHORTNESS OF BREATH): ICD-10-CM

## 2017-10-10 PROCEDURE — 99214 OFFICE O/P EST MOD 30 MIN: CPT | Performed by: INTERNAL MEDICINE

## 2017-10-10 NOTE — PROGRESS NOTES
Subjective:   Valeriy Orlando is a 71 y.o. male here today for feeling tired, back pain, lab work results     Pt is here to follow up with results. GFR improved. Off NSAIDs.   GFR nl. He still persist to have chest pain, acid reflux. EGD reviewed. Possible King. I explained that he needs to be on omeprazole. I offered patient to increase Omeprazole 40 mg daily. He refuses at this point. He denies chest pain. See previous note he has complain of shortness of breath when he walk, not during exertion or exercising. He reports that during walk his back hurts and he feels tired. He feels tired after he eats too. PFTs on file, unremarkable. No chest pain.   Otherwise blood pressure is normal  Back is bothering him more then anything. He would like to be active. He has upcoming apt for stress test and echocardiogram     Current medicines (including changes today)  Current Outpatient Prescriptions   Medication Sig Dispense Refill   • fluorouracil (EFUDEX) 5 % cream APPLY TOPICALLY TO RIGHT TEMPLE 2XS/WEEK OR UNTIL RED&CRUSTY. WASH HANDS IMMED AFTER. AVOID EYES  2   • aspirin 81 MG tablet Take 81 mg by mouth every day.     • lisinopril (PRINIVIL) 10 MG Tab Take 1 Tab by mouth every day. 90 Tab 3   • omeprazole (PRILOSEC) 20 MG delayed-release capsule Take 1 Cap by mouth as needed (For heartburn). 90 Cap 3   • MELATONIN PO Take 3 Tabs by mouth at bedtime as needed (For sleep).       No current facility-administered medications for this visit.      He  has a past medical history of Depression; Heart burn; Hepatitis A (2001); Hypertension; Prediabetes; Sleep apnea; and Snoring.    Current Outpatient Prescriptions   Medication Sig Dispense Refill   • fluorouracil (EFUDEX) 5 % cream APPLY TOPICALLY TO RIGHT TEMPLE 2XS/WEEK OR UNTIL RED&CRUSTY. WASH HANDS IMMED AFTER. AVOID EYES  2   • aspirin 81 MG tablet Take 81 mg by mouth every day.     • lisinopril (PRINIVIL) 10 MG Tab Take 1 Tab by mouth every day. 90 Tab 3   •  omeprazole (PRILOSEC) 20 MG delayed-release capsule Take 1 Cap by mouth as needed (For heartburn). 90 Cap 3   • MELATONIN PO Take 3 Tabs by mouth at bedtime as needed (For sleep).       No current facility-administered medications for this visit.        Allergies as of 10/10/2017   • (No Known Allergies)       Social History     Social History   • Marital status: Single     Spouse name: N/A   • Number of children: N/A   • Years of education: N/A     Occupational History   • Not on file.     Social History Main Topics   • Smoking status: Never Smoker   • Smokeless tobacco: Never Used   • Alcohol use Yes      Comment: 25 per month/ 1 glass wine per day   • Drug use: No   • Sexual activity: Yes     Partners: Female     Other Topics Concern   • Not on file     Social History Narrative   • No narrative on file        Family History   Problem Relation Age of Onset   • Heart Disease Mother    • Cancer Mother      lung cancer   • Heart Disease Maternal Grandmother    • Diabetes Paternal Grandfather        Past Surgical History:   Procedure Laterality Date   • LUMBAR LAMINECTOMY DISKECTOMY  11/29/2016    Procedure: LUMBAR LAMINECTOMY DISKECTOMY L3-5 open laminectomy ;  Surgeon: Bentley Claudio M.D.;  Location: SURGERY [facility];  Service:    • OTHER ORTHOPEDIC SURGERY  1990    right knee scope   • GROIN EXPLORATION      growth   • KNEE ARTHROSCOPY         ROS   No chest pain, no shortness of breath, no abdominal pain       Objective:     Blood pressure 126/70, pulse 73, temperature 36.7 °C (98.1 °F), resp. rate 16, height 1.829 m (6'), weight 109.8 kg (242 lb), SpO2 93 %. Body mass index is 32.82 kg/m².   Physical Exam  Constitutional: Alert, no distress.  Skin: Warm, dry, good turgor, no rashes in visible areas.  Eye:lids normal.  ENMT: Lips without lesions, good dentition, oropharynx clear.  Neck: Trachea midline, no masses, no thyromegaly. No cervical or supraclavicular lymphadenopathy  Respiratory: Unlabored  respiratory effort,  Psych: Alert and oriented x3, normal affect and mood.        Assessment and Plan:   The following treatment plan was discussed    1. Degeneration of lumbar intervertebral disc  Follow up with neurosurgeon. Pt refuses pain meds     2. Obesity (BMI 30-39.9)  Counseling for a small portion, balanced diet, exercising 3-5 times per week.      3. Essential hypertension  Continue same treatment. Continue to monitor     4. SOB (shortness of breath)  Follow up with stress test , echocardiogram     5. JIHAN  Resolved. Due to NSAIDs. Well hydration. Continue to monitor       Followup: Return in about 3 months (around 1/10/2018), or if symptoms worsen or fail to improve, for Short.

## 2017-10-19 ENCOUNTER — HOSPITAL ENCOUNTER (OUTPATIENT)
Dept: CARDIOLOGY | Facility: MEDICAL CENTER | Age: 72
End: 2017-10-19
Attending: INTERNAL MEDICINE
Payer: MEDICARE

## 2017-10-19 DIAGNOSIS — R06.02 SOB (SHORTNESS OF BREATH): ICD-10-CM

## 2017-10-19 DIAGNOSIS — R01.1 HEART MURMUR: ICD-10-CM

## 2017-10-19 LAB
LV EJECT FRACT  99904: 70
LV EJECT FRACT MOD 2C 99903: 80.23
LV EJECT FRACT MOD 4C 99902: 71.38
LV EJECT FRACT MOD BP 99901: 72.47

## 2017-10-19 PROCEDURE — 93306 TTE W/DOPPLER COMPLETE: CPT | Mod: 26 | Performed by: INTERNAL MEDICINE

## 2017-10-19 PROCEDURE — 93306 TTE W/DOPPLER COMPLETE: CPT

## 2017-10-25 ENCOUNTER — TELEPHONE (OUTPATIENT)
Dept: MEDICAL GROUP | Facility: PHYSICIAN GROUP | Age: 72
End: 2017-10-25

## 2017-10-25 NOTE — TELEPHONE ENCOUNTER
Phone Number Called: [phone] (home)     Message: Unable to reach pt left vm to call back.     Left Message for patient to call back: yes

## 2017-10-25 NOTE — TELEPHONE ENCOUNTER
Phone Number Called: [phone] (home)     Message: Unable to reach pt.     Left Message for patient to call back: N\A

## 2017-10-25 NOTE — TELEPHONE ENCOUNTER
----- Message from Kaci Ward M.D. sent at 10/25/2017  6:51 AM PDT -----  Please let pt know that echocardiogram is normal   Thank you  Kaci Ward M.D.

## 2017-10-26 ENCOUNTER — HOSPITAL ENCOUNTER (OUTPATIENT)
Dept: RADIOLOGY | Facility: MEDICAL CENTER | Age: 72
End: 2017-10-26
Attending: INTERNAL MEDICINE
Payer: MEDICARE

## 2017-10-26 DIAGNOSIS — R10.9 FLANK PAIN: ICD-10-CM

## 2017-10-26 DIAGNOSIS — R06.02 SOB (SHORTNESS OF BREATH): ICD-10-CM

## 2017-10-26 PROCEDURE — 700117 HCHG RX CONTRAST REV CODE 255: Performed by: INTERNAL MEDICINE

## 2017-10-26 PROCEDURE — A9502 TC99M TETROFOSMIN: HCPCS

## 2017-10-26 PROCEDURE — 700111 HCHG RX REV CODE 636 W/ 250 OVERRIDE (IP)

## 2017-10-26 PROCEDURE — 74178 CT ABD&PLV WO CNTR FLWD CNTR: CPT

## 2017-10-26 RX ORDER — REGADENOSON 0.08 MG/ML
INJECTION, SOLUTION INTRAVENOUS
Status: COMPLETED
Start: 2017-10-26 | End: 2017-10-26

## 2017-10-26 RX ADMIN — IOHEXOL 100 ML: 350 INJECTION, SOLUTION INTRAVENOUS at 13:52

## 2017-10-26 RX ADMIN — IOHEXOL 50 ML: 240 INJECTION, SOLUTION INTRATHECAL; INTRAVASCULAR; INTRAVENOUS; ORAL at 13:53

## 2017-10-26 RX ADMIN — REGADENOSON 0.4 MG: 0.08 INJECTION, SOLUTION INTRAVENOUS at 11:22

## 2017-10-26 NOTE — TELEPHONE ENCOUNTER
Phone Number Called: [phone] (home)     Message: Pt notified of results below.     Left Message for patient to call back: N\A

## 2017-10-31 ENCOUNTER — TELEPHONE (OUTPATIENT)
Dept: MEDICAL GROUP | Facility: PHYSICIAN GROUP | Age: 72
End: 2017-10-31

## 2017-10-31 NOTE — TELEPHONE ENCOUNTER
Spoke with pt with the result of positive stress test. I email Dr. Jung to see if they can see this pt sooner. Pt has no further questions at this time   Kaci Ward M.D.

## 2017-11-06 ENCOUNTER — OFFICE VISIT (OUTPATIENT)
Dept: CARDIOLOGY | Facility: MEDICAL CENTER | Age: 72
End: 2017-11-06
Payer: MEDICARE

## 2017-11-06 VITALS
SYSTOLIC BLOOD PRESSURE: 130 MMHG | DIASTOLIC BLOOD PRESSURE: 70 MMHG | OXYGEN SATURATION: 93 % | BODY MASS INDEX: 33.18 KG/M2 | WEIGHT: 245 LBS | HEART RATE: 60 BPM | HEIGHT: 72 IN

## 2017-11-06 DIAGNOSIS — G47.33 OSA ON CPAP: ICD-10-CM

## 2017-11-06 DIAGNOSIS — E66.9 OBESITY (BMI 30-39.9): ICD-10-CM

## 2017-11-06 DIAGNOSIS — R06.09 DOE (DYSPNEA ON EXERTION): ICD-10-CM

## 2017-11-06 DIAGNOSIS — I10 ESSENTIAL HYPERTENSION: ICD-10-CM

## 2017-11-06 DIAGNOSIS — R06.02 SOB (SHORTNESS OF BREATH): ICD-10-CM

## 2017-11-06 DIAGNOSIS — E88.810 METABOLIC SYNDROME X: ICD-10-CM

## 2017-11-06 PROCEDURE — 99214 OFFICE O/P EST MOD 30 MIN: CPT | Performed by: INTERNAL MEDICINE

## 2017-11-06 RX ORDER — NITROGLYCERIN 0.4 MG/1
0.4 TABLET SUBLINGUAL PRN
Qty: 25 TAB | Refills: 3 | Status: ON HOLD | OUTPATIENT
Start: 2017-11-06 | End: 2018-09-04

## 2017-11-06 NOTE — PROGRESS NOTES
Chief Complaint   Patient presents with   • Follow-Up     stress test review     GROSSMAN,     This patient is an established male who is here today to discuss:  NUCLEAR IMAGING INTERPRETATION   Distal inferoapical infarct with minimal reversible perfusion adjacent to proximal margins of small infarct.   No significant ischemia.   LVEF = 51%.   Inferior wall hypokinesia.    Patient Active Problem List    Diagnosis Date Noted   • Heart murmur 09/13/2017   • Abnormal EKG 06/06/2017   • Essential hypertension 05/03/2017   • Prediabetes 05/03/2017   • Gastroesophageal reflux disease without esophagitis 05/03/2017   • Obesity (BMI 30-39.9) 05/03/2017   • SOB (shortness of breath) 05/03/2017   • MAVERICK (obstructive sleep apnea) 05/03/2017   • Degeneration of lumbar intervertebral disc 11/29/2016       Past Medical History:   Diagnosis Date   • Depression     pre diabetes   • Heart burn    • Hepatitis A 2001   • Hypertension    • Prediabetes    • Sleep apnea     cpap in use   • Snoring      Past Surgical History:   Procedure Laterality Date   • LUMBAR LAMINECTOMY DISKECTOMY  11/29/2016    Procedure: LUMBAR LAMINECTOMY DISKECTOMY L3-5 open laminectomy ;  Surgeon: Bentley Claudio M.D.;  Location: SURGERY [facility];  Service:    • OTHER ORTHOPEDIC SURGERY  1990    right knee scope   • GROIN EXPLORATION      growth   • KNEE ARTHROSCOPY       Social History     Social History   • Marital status: Single     Spouse name: N/A   • Number of children: N/A   • Years of education: N/A     Social History Main Topics   • Smoking status: Never Smoker   • Smokeless tobacco: Never Used   • Alcohol use Yes      Comment: 25 per month/ 1 glass wine per day   • Drug use: No   • Sexual activity: Yes     Partners: Female     Other Topics Concern   • Not on file     Social History Narrative   • No narrative on file     Family History   Problem Relation Age of Onset   • Heart Disease Mother    • Cancer Mother      lung cancer   • Heart Disease  Maternal Grandmother    • Diabetes Paternal Grandfather        Current Outpatient Prescriptions   Medication Sig Dispense Refill   • aspirin 81 MG tablet Take 81 mg by mouth every day.     • lisinopril (PRINIVIL) 10 MG Tab Take 1 Tab by mouth every day. 90 Tab 3   • omeprazole (PRILOSEC) 20 MG delayed-release capsule Take 1 Cap by mouth as needed (For heartburn). 90 Cap 3   • fluorouracil (EFUDEX) 5 % cream APPLY TOPICALLY TO RIGHT TEMPLE 2XS/WEEK OR UNTIL RED&CRUSTY. WASH HANDS IMMED AFTER. AVOID EYES  2   • MELATONIN PO Take 3 Tabs by mouth at bedtime as needed (For sleep).       No current facility-administered medications for this visit.      Review of patient's allergies indicates no known allergies.    Review of Systems:     Constitutional: Denies fevers, Denies weight changes  Eyes: Denies changes in vision, no eye pain  Ears/Nose/Throat/Mouth: Denies nasal congestion or sore throat   Cardiovascular: Denies chest pain or palpitations   Respiratory: Denies shortness of breath , Denies cough  Gastrointestinal/Hepatic: Denies abdominal pain, nausea, vomiting, diarrhea, constipation or GI bleeding   Genitourinary: Denies bladder dysfunction, dysuria or frequency  Musculoskeletal/Rheum: Denies  joint pain and swelling   Skin/Breast: Denies rash, denies breast lumps or discharge  Neurological: Denies headache, confusion, memory loss or focal weakness/parasthesias  Psychiatric: denies mood disorder   Endocrine: denies hx of diabetes or thyroid dysfunction  Heme/Oncology/Lymph Nodes: Denies enlarged lymph nodes, denies brusing or known bleeding disorder  Allergic/Immunologic: Denies hx of allergies      All other systems were reviewed and are negative (AMA/CMS criteria)      Blood pressure 130/70, pulse 60, height 1.829 m (6'), weight 111.1 kg (245 lb), SpO2 93 %.  General Appearance:  Obese;  Well developed, Well nourished, No acute distress, Non-toxic appearance.   HENT:  Normocephalic, Atraumatic, Oropharynx moist  mucous membranes, Dentition: Mallampati 4 OP, Nose normal.    Eyes:  PERRLA, EOMI, Conjunctiva normal, No discharge.  Neck:  Normal range of motion, No cervical tenderness, Supple, No stridor, no JVD .  No thyromegaly.  No carotid bruit.  Cardiovascular:  Normal heart rate, Normal rhythm,  S1, S2, no S3,  S4; No gallops; No murmurs, No rubs, .   Extremitites with intact distal pulses, no cyanosis, clubbing or edema.  No heaves, thrills, HJR;  Peripheral pulses: carotid 2+, brachial 2+, radial 2+, ulnar 2+, femoral 2+, popliteal 2+, PT 2+, DP 2+;  Lungs:  Respiratory effort is normal. Normal breath sounds, breath sounds clear to auscultation bilaterally,  no rales, no rhonchi, no wheezing.   Abdomen: Bowel sounds normal, Soft, No tenderness, No guarding, No rebound, No masses, No hepatosplenomegaly.  Skin: Warm, Dry, No erythema, No rash, no induration or crepitus.  Neurologic: Alert & oriented x 3, Normal motor function, Normal sensory function, No focal deficits noted, cranial nerves II through XII are normal, ab normal gait.  Psychiatric: Affect normal, Judgment normal, Mood normal.    Results for MICHELL ALANIS (MRN [id number]) as of 11/6/2017 10:15   Ref. Range 9/19/2017 12:19 10/9/2017 13:13 10/19/2017 09:57   Sodium Latest Ref Range: 135 - 145 mmol/L 136 136    Potassium Latest Ref Range: 3.6 - 5.5 mmol/L 4.5 4.2    Chloride Latest Ref Range: 96 - 112 mmol/L 104 106    Co2 Latest Ref Range: 20 - 33 mmol/L 22 22    Anion Gap Latest Ref Range: 0.0 - 11.9  10.0 8.0    Glucose Latest Ref Range: 65 - 99 mg/dL 117 (H) 147 (H)    Bun Latest Ref Range: 8 - 22 mg/dL 29 (H) 29 (H)    Creatinine Latest Ref Range: 0.50 - 1.40 mg/dL 1.25 1.12    GFR If  Latest Ref Range: >60 mL/min/1.73 m 2 >60 >60    GFR If Non  Latest Ref Range: >60 mL/min/1.73 m 2 57 (A) >60    Calcium Latest Ref Range: 8.5 - 10.5 mg/dL 10.1 9.9      Assessment and Plan.   71 y.o. male has distal LAD scar and little  ischemia; will try to treat medically first; NTG SL;     1. SOB (shortness of breath)  stable    2. Essential hypertension  controlled    3. GROSSMAN (dyspnea on exertion)  Still; will try NTG SL prn and do preconditioning as I instructed    4. MAVERICK on CPAP  continue    5. Obesity (BMI 30-39.9)  stable    6. Metabolic syndrome X  PCP      Return to clinic in  3 , months    1. SOB (shortness of breath)     2. Essential hypertension     3. GROSSMAN (dyspnea on exertion)     4. MAVERICK on CPAP     5. Obesity (BMI 30-39.9)     6. Metabolic syndrome X

## 2017-12-13 ENCOUNTER — OFFICE VISIT (OUTPATIENT)
Dept: MEDICAL GROUP | Facility: PHYSICIAN GROUP | Age: 72
End: 2017-12-13
Payer: MEDICARE

## 2017-12-13 VITALS
BODY MASS INDEX: 32.51 KG/M2 | OXYGEN SATURATION: 92 % | TEMPERATURE: 98.7 F | HEIGHT: 72 IN | SYSTOLIC BLOOD PRESSURE: 124 MMHG | WEIGHT: 240 LBS | RESPIRATION RATE: 16 BRPM | DIASTOLIC BLOOD PRESSURE: 74 MMHG | HEART RATE: 68 BPM

## 2017-12-13 DIAGNOSIS — I10 ESSENTIAL HYPERTENSION: ICD-10-CM

## 2017-12-13 DIAGNOSIS — K76.9 LIVER LESION: ICD-10-CM

## 2017-12-13 DIAGNOSIS — R06.02 SOB (SHORTNESS OF BREATH): ICD-10-CM

## 2017-12-13 DIAGNOSIS — R94.31 ABNORMAL EKG: ICD-10-CM

## 2017-12-13 DIAGNOSIS — I35.1 NONRHEUMATIC AORTIC VALVE INSUFFICIENCY: ICD-10-CM

## 2017-12-13 DIAGNOSIS — M51.36 DEGENERATION OF LUMBAR INTERVERTEBRAL DISC: ICD-10-CM

## 2017-12-13 DIAGNOSIS — G47.33 OSA (OBSTRUCTIVE SLEEP APNEA): ICD-10-CM

## 2017-12-13 DIAGNOSIS — R73.03 PREDIABETES: ICD-10-CM

## 2017-12-13 DIAGNOSIS — E66.9 OBESITY (BMI 30-39.9): ICD-10-CM

## 2017-12-13 DIAGNOSIS — R94.39 ABNORMAL STRESS TEST: ICD-10-CM

## 2017-12-13 DIAGNOSIS — K21.9 GASTROESOPHAGEAL REFLUX DISEASE WITHOUT ESOPHAGITIS: ICD-10-CM

## 2017-12-13 PROCEDURE — 99214 OFFICE O/P EST MOD 30 MIN: CPT | Performed by: INTERNAL MEDICINE

## 2017-12-13 ASSESSMENT — PAIN SCALES - GENERAL: PAINLEVEL: NO PAIN

## 2017-12-13 NOTE — ASSESSMENT & PLAN NOTE
Incidental findings on abdomen CT. It is  Recommended MRI. Order in place. No weight loss. LFTs normal. No night sweats

## 2017-12-13 NOTE — ASSESSMENT & PLAN NOTE
Doing better, he is doing reverse excersise. He is feeling better since he is doing stretching exercise. He is not taking anything for pain. He is asking for Hemp oil. I advise it is not FDA approved. No numbness or tingling.

## 2017-12-13 NOTE — ASSESSMENT & PLAN NOTE
See previous note. He was complaining of SOB when he walks, no sob when he was excersising. EKG abnormal. He had stress test positive for Inferior wall hypokinesia. He was seen by cardiology. he was advise to use nitro if he feels sob. He has not felt sob, because he has not done any exertional activities. Follow up with cardiology.

## 2017-12-13 NOTE — ASSESSMENT & PLAN NOTE
Sometimes glucose slight elevated. He likes to eat everything, he eat ice cream a lot. Continue to monitor

## 2017-12-13 NOTE — ASSESSMENT & PLAN NOTE
EGD 2014. 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 He stopped again taking prilosec. He denies indigestion or acid reflux. He denies abdominal pain, hematochezia, melanotic stool. Follow up with GI 2019

## 2017-12-13 NOTE — PROGRESS NOTES
Subjective:   Valeriy Orlando is a 72 y.o. male here today for lab work, follow up on recent imaging     Abnormal stress test  See previous note. He was complaining of SOB when he walks, no sob when he was excersising. EKG abnormal. He had stress test positive for Inferior wall hypokinesia. He was seen by cardiology. he was advise to use nitro if he feels sob. He has not felt sob, because he has not done any exertional activities. Follow up with cardiology.     Nonrheumatic aortic valve insufficiency  Mild, echo 10/2017 . Not symptomatic. Repeat in 5 years     Prediabetes  Sometimes glucose slight elevated. He likes to eat everything, he eat ice cream a lot. Continue to monitor     MAVERICK (obstructive sleep apnea)  He keep CPAP machine. Compliant. He follows with [facility].     Obesity (BMI 30-39.9)  He had lost some weight. He is trying to eat healthy. He is exercising a lot .     Liver lesion  Incidental findings on abdomen CT. It is  Recommended MRI. Order in place. No weight loss. LFTs normal. No night sweats     Gastroesophageal reflux disease without esophagitis  EGD 2014. 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 He stopped again taking prilosec. He denies indigestion or acid reflux. He denies abdominal pain, hematochezia, melanotic stool. Follow up with GI 2019    Essential hypertension  On lisinopril, well controled. No side effects. Tolerate current regimen well.     Degeneration of lumbar intervertebral disc  Doing better, he is doing reverse excersise. He is feeling better since he is doing stretching exercise. He is not taking anything for pain. He is asking for Hemp oil. I advise it is not FDA approved. No numbness or tingling.        Current medicines (including changes today)  Current Outpatient Prescriptions   Medication Sig Dispense Refill   • nitroglycerin (NITROSTAT) 0.4 MG SL Tab Place 1 Tab under tongue as needed for Chest Pain. 25 Tab 3   • fluorouracil (EFUDEX) 5 % cream APPLY TOPICALLY TO RIGHT TEMPLE 2XS/WEEK  OR UNTIL RED&CRUSTY. WASH HANDS IMMED AFTER. AVOID EYES  2   • aspirin 81 MG tablet Take 81 mg by mouth every day.     • lisinopril (PRINIVIL) 10 MG Tab Take 1 Tab by mouth every day. 90 Tab 3   • omeprazole (PRILOSEC) 20 MG delayed-release capsule Take 1 Cap by mouth as needed (For heartburn). 90 Cap 3   • MELATONIN PO Take 3 Tabs by mouth at bedtime as needed (For sleep).       No current facility-administered medications for this visit.      He  has a past medical history of Depression; Heart burn; Hepatitis A (2001); Hypertension; Prediabetes; Sleep apnea; and Snoring.    Current Outpatient Prescriptions   Medication Sig Dispense Refill   • nitroglycerin (NITROSTAT) 0.4 MG SL Tab Place 1 Tab under tongue as needed for Chest Pain. 25 Tab 3   • fluorouracil (EFUDEX) 5 % cream APPLY TOPICALLY TO RIGHT TEMPLE 2XS/WEEK OR UNTIL RED&CRUSTY. WASH HANDS IMMED AFTER. AVOID EYES  2   • aspirin 81 MG tablet Take 81 mg by mouth every day.     • lisinopril (PRINIVIL) 10 MG Tab Take 1 Tab by mouth every day. 90 Tab 3   • omeprazole (PRILOSEC) 20 MG delayed-release capsule Take 1 Cap by mouth as needed (For heartburn). 90 Cap 3   • MELATONIN PO Take 3 Tabs by mouth at bedtime as needed (For sleep).       No current facility-administered medications for this visit.        Allergies as of 12/13/2017   • (No Known Allergies)       Social History     Social History   • Marital status: Single     Spouse name: N/A   • Number of children: N/A   • Years of education: N/A     Occupational History   • Not on file.     Social History Main Topics   • Smoking status: Never Smoker   • Smokeless tobacco: Never Used   • Alcohol use Yes      Comment: 25 per month/ 1 glass wine per day   • Drug use: No   • Sexual activity: Yes     Partners: Female     Other Topics Concern   • Not on file     Social History Narrative   • No narrative on file        Family History   Problem Relation Age of Onset   • Heart Disease Mother    • Cancer Mother       lung cancer   • Heart Disease Maternal Grandmother    • Diabetes Paternal Grandfather        Past Surgical History:   Procedure Laterality Date   • LUMBAR LAMINECTOMY DISKECTOMY  11/29/2016    Procedure: LUMBAR LAMINECTOMY DISKECTOMY L3-5 open laminectomy ;  Surgeon: Bentley Claudio M.D.;  Location: SURGERY [facility];  Service:    • OTHER ORTHOPEDIC SURGERY  1990    right knee scope   • GROIN EXPLORATION      growth   • KNEE ARTHROSCOPY         ROS   All systems reviewed are negative except for HPI         Objective:     Blood pressure 124/74, pulse 68, temperature 37.1 °C (98.7 °F), resp. rate 16, height 1.829 m (6'), weight 108.9 kg (240 lb), SpO2 92 %. Body mass index is 32.55 kg/m².   Physical Exam:  Constitutional: Alert, no distress.  Skin: Warm, dry, good turgor, no rashes in visible areas.  Eye: Equal, round and reactive, conjunctiva clear, lids normal.  ENMT: Lips without lesions, good dentition, oropharynx clear.  Neck: Trachea midline, no masses, no thyromegaly. No cervical or supraclavicular lymphadenopathy  Respiratory: Unlabored respiratory effort, lungs clear to auscultation, no wheezes, no ronchi.  Cardiovascular: Normal S1, S2, + murmur, no edema.  Abdomen: Soft, non-tender, no masses, no hepatosplenomegaly.  Psych: Alert and oriented x3, normal affect and mood.        Assessment and Plan:   The following treatment plan was discussed    1. SOB (shortness of breath)  2. Abnormal EKG  3. Abnormal stress test  Follow up with cards, conservative management at this time.   - CBC WITH DIFFERENTIAL; Future  - COMP METABOLIC PANEL; Future  - LIPID PROFILE; Future        3. Liver lesion  MRI for further evaluation.   - CBC WITH DIFFERENTIAL; Future  - COMP METABOLIC PANEL; Future  - MR-ABDOMEN-WITH & W/O; Future    4. Degeneration of lumbar intervertebral disc  Continue stretching exercising. Avoid NSAIDs. Tylenol is not working per patient. He is refusing any other medication at this time   - CBC WITH  DIFFERENTIAL; Future  - COMP METABOLIC PANEL; Future    5. Essential hypertension  Continue same treatment. Continue to monitor  - CBC WITH DIFFERENTIAL; Future  - COMP METABOLIC PANEL; Future  - LIPID PROFILE; Future    6. MAVERICK (obstructive sleep apnea)  Encouraged to continue keeping CPAP machine. Follow up with pulmonologist  - CBC WITH DIFFERENTIAL; Future  - COMP METABOLIC PANEL; Future    7. Prediabetes  Continue to monitor  - CBC WITH DIFFERENTIAL; Future  - COMP METABOLIC PANEL; Future  - HEMOGLOBIN A1C; Future    8. Obesity (BMI 30-39.9)  Counseling for a small portion, balanced diet, exercising for3-5 times per week.    - CBC WITH DIFFERENTIAL; Future  - COMP METABOLIC PANEL; Future    9. Gastroesophageal reflux disease without esophagitis  Omeprazole prn. No need for daily medication   - CBC WITH DIFFERENTIAL; Future  - COMP METABOLIC PANEL; Future      11. Nonrheumatic aortic valve insufficiency  Non symptomatic, mild. Consider repeat echocardiogram in 5 years.       Followup: Return in about 6 months (around 6/13/2018), or if symptoms worsen or fail to improve, for Short.

## 2017-12-26 ENCOUNTER — HOSPITAL ENCOUNTER (OUTPATIENT)
Dept: LAB | Facility: MEDICAL CENTER | Age: 72
End: 2017-12-26
Attending: INTERNAL MEDICINE
Payer: MEDICARE

## 2017-12-26 DIAGNOSIS — K21.9 GASTROESOPHAGEAL REFLUX DISEASE WITHOUT ESOPHAGITIS: ICD-10-CM

## 2017-12-26 DIAGNOSIS — K76.9 LIVER LESION: ICD-10-CM

## 2017-12-26 DIAGNOSIS — R06.02 SOB (SHORTNESS OF BREATH): ICD-10-CM

## 2017-12-26 DIAGNOSIS — E66.9 OBESITY (BMI 30-39.9): ICD-10-CM

## 2017-12-26 DIAGNOSIS — M51.36 DEGENERATION OF LUMBAR INTERVERTEBRAL DISC: ICD-10-CM

## 2017-12-26 DIAGNOSIS — R73.03 PREDIABETES: ICD-10-CM

## 2017-12-26 DIAGNOSIS — I10 ESSENTIAL HYPERTENSION: ICD-10-CM

## 2017-12-26 DIAGNOSIS — G47.33 OSA (OBSTRUCTIVE SLEEP APNEA): ICD-10-CM

## 2017-12-26 DIAGNOSIS — R94.31 ABNORMAL EKG: ICD-10-CM

## 2017-12-26 LAB
ALBUMIN SERPL BCP-MCNC: 4.2 G/DL (ref 3.2–4.9)
ALBUMIN/GLOB SERPL: 1.6 G/DL
ALP SERPL-CCNC: 47 U/L (ref 30–99)
ALT SERPL-CCNC: 25 U/L (ref 2–50)
ANION GAP SERPL CALC-SCNC: 7 MMOL/L (ref 0–11.9)
AST SERPL-CCNC: 21 U/L (ref 12–45)
BASOPHILS # BLD AUTO: 1.2 % (ref 0–1.8)
BASOPHILS # BLD: 0.07 K/UL (ref 0–0.12)
BILIRUB SERPL-MCNC: 0.5 MG/DL (ref 0.1–1.5)
BUN SERPL-MCNC: 34 MG/DL (ref 8–22)
CALCIUM SERPL-MCNC: 10.2 MG/DL (ref 8.5–10.5)
CHLORIDE SERPL-SCNC: 106 MMOL/L (ref 96–112)
CHOLEST SERPL-MCNC: 184 MG/DL (ref 100–199)
CO2 SERPL-SCNC: 23 MMOL/L (ref 20–33)
CREAT SERPL-MCNC: 1.29 MG/DL (ref 0.5–1.4)
EOSINOPHIL # BLD AUTO: 0.51 K/UL (ref 0–0.51)
EOSINOPHIL NFR BLD: 8.5 % (ref 0–6.9)
ERYTHROCYTE [DISTWIDTH] IN BLOOD BY AUTOMATED COUNT: 49.1 FL (ref 35.9–50)
EST. AVERAGE GLUCOSE BLD GHB EST-MCNC: 126 MG/DL
GFR SERPL CREATININE-BSD FRML MDRD: 55 ML/MIN/1.73 M 2
GLOBULIN SER CALC-MCNC: 2.7 G/DL (ref 1.9–3.5)
GLUCOSE SERPL-MCNC: 113 MG/DL (ref 65–99)
HBA1C MFR BLD: 6 % (ref 0–5.6)
HCT VFR BLD AUTO: 44.5 % (ref 42–52)
HDLC SERPL-MCNC: 71 MG/DL
HGB BLD-MCNC: 14.9 G/DL (ref 14–18)
IMM GRANULOCYTES # BLD AUTO: 0.01 K/UL (ref 0–0.11)
IMM GRANULOCYTES NFR BLD AUTO: 0.2 % (ref 0–0.9)
LDLC SERPL CALC-MCNC: 105 MG/DL
LYMPHOCYTES # BLD AUTO: 1.79 K/UL (ref 1–4.8)
LYMPHOCYTES NFR BLD: 29.7 % (ref 22–41)
MCH RBC QN AUTO: 32.4 PG (ref 27–33)
MCHC RBC AUTO-ENTMCNC: 33.5 G/DL (ref 33.7–35.3)
MCV RBC AUTO: 96.7 FL (ref 81.4–97.8)
MONOCYTES # BLD AUTO: 0.44 K/UL (ref 0–0.85)
MONOCYTES NFR BLD AUTO: 7.3 % (ref 0–13.4)
NEUTROPHILS # BLD AUTO: 3.21 K/UL (ref 1.82–7.42)
NEUTROPHILS NFR BLD: 53.1 % (ref 44–72)
NRBC # BLD AUTO: 0 K/UL
NRBC BLD-RTO: 0 /100 WBC
PLATELET # BLD AUTO: 231 K/UL (ref 164–446)
PMV BLD AUTO: 9.2 FL (ref 9–12.9)
POTASSIUM SERPL-SCNC: 4.5 MMOL/L (ref 3.6–5.5)
PROT SERPL-MCNC: 6.9 G/DL (ref 6–8.2)
RBC # BLD AUTO: 4.6 M/UL (ref 4.7–6.1)
SODIUM SERPL-SCNC: 136 MMOL/L (ref 135–145)
TRIGL SERPL-MCNC: 38 MG/DL (ref 0–149)
WBC # BLD AUTO: 6 K/UL (ref 4.8–10.8)

## 2017-12-26 PROCEDURE — 85025 COMPLETE CBC W/AUTO DIFF WBC: CPT

## 2017-12-26 PROCEDURE — 36415 COLL VENOUS BLD VENIPUNCTURE: CPT

## 2017-12-26 PROCEDURE — 83036 HEMOGLOBIN GLYCOSYLATED A1C: CPT | Mod: GA

## 2017-12-26 PROCEDURE — 80053 COMPREHEN METABOLIC PANEL: CPT

## 2017-12-26 PROCEDURE — 80061 LIPID PANEL: CPT

## 2017-12-29 ENCOUNTER — TELEPHONE (OUTPATIENT)
Dept: MEDICAL GROUP | Facility: PHYSICIAN GROUP | Age: 72
End: 2017-12-29

## 2017-12-29 NOTE — TELEPHONE ENCOUNTER
----- Message from Kaci Ward M.D. sent at 12/29/2017 12:55 PM PST -----  Please notify patient that blood sugar are slight elevated. No need for treatment, but I would advise low carb diet, smaller portion.   liver function, cholesterol, blood count are normal  Kidney function is stable, as before  Thank you,  Kaci Ward M.D.

## 2018-01-03 ENCOUNTER — TELEPHONE (OUTPATIENT)
Dept: MEDICAL GROUP | Facility: PHYSICIAN GROUP | Age: 73
End: 2018-01-03

## 2018-01-03 ENCOUNTER — HOSPITAL ENCOUNTER (OUTPATIENT)
Dept: RADIOLOGY | Facility: MEDICAL CENTER | Age: 73
End: 2018-01-03
Attending: INTERNAL MEDICINE
Payer: MEDICARE

## 2018-01-03 DIAGNOSIS — K76.9 LIVER LESION: ICD-10-CM

## 2018-01-03 PROCEDURE — 74183 MRI ABD W/O CNTR FLWD CNTR: CPT

## 2018-01-03 PROCEDURE — 700117 HCHG RX CONTRAST REV CODE 255: Performed by: INTERNAL MEDICINE

## 2018-01-03 PROCEDURE — A9577 INJ MULTIHANCE: HCPCS | Performed by: INTERNAL MEDICINE

## 2018-01-03 RX ADMIN — GADOBENATE DIMEGLUMINE 10 ML: 529 INJECTION, SOLUTION INTRAVENOUS at 15:14

## 2018-01-04 NOTE — TELEPHONE ENCOUNTER
----- Message from Kaci Ward M.D. sent at 1/3/2018  5:04 PM PST -----  Please let pt knows that there are benign cyst lesion on the liver. No other test is indicated   Thank you,  Kaci Ward M.D.

## 2018-02-13 ENCOUNTER — TELEPHONE (OUTPATIENT)
Dept: MEDICAL GROUP | Facility: PHYSICIAN GROUP | Age: 73
End: 2018-02-13

## 2018-02-13 NOTE — TELEPHONE ENCOUNTER
ESTABLISHED PATIENT PRE-VISIT PLANNING     Note: Patient will not be contacted if there is no indication to call.     1.  Reviewed notes from the last few office visits within the medical group: Yes    2.  If any orders were placed at last visit or intended to be done for this visit (i.e. 6 mos follow-up), do we have Results/Consult Notes?        •  Labs - Labs ordered, completed on 12/26/17 and results are in chart.   Note: If patient appointment is for lab review and patient did not complete labs, check with provider if OK to reschedule patient until labs completed.       •  Imaging - Imaging ordered, completed and results are in chart.       •  Referrals - Referral ordered, patient was seen and consult notes are in chart. Care Teams updated  NO.    3. Is this appointment scheduled as a Hospital Follow-Up? No    4.  Immunizations were updated in [employer] using WebIZ?: Yes       •  Web Iz Recommendations: TD and ZOSTAVAX (Shingles)    5.  Patient is due for the following Health Maintenance Topics:   Health Maintenance Due   Topic Date Due   • Annual Wellness Visit  1945       - Patient has completed FLU, PREVNAR (PCV13)  and TDAP Immunization(s) per WebIZ. Chart has been updated.    6.  Patient was NOT informed to arrive 15 min prior to their scheduled appointment and bring in their medication bottles.

## 2018-02-14 ENCOUNTER — OFFICE VISIT (OUTPATIENT)
Dept: MEDICAL GROUP | Facility: PHYSICIAN GROUP | Age: 73
End: 2018-02-14
Payer: MEDICARE

## 2018-02-14 VITALS
BODY MASS INDEX: 32.58 KG/M2 | WEIGHT: 240.52 LBS | OXYGEN SATURATION: 94 % | DIASTOLIC BLOOD PRESSURE: 70 MMHG | SYSTOLIC BLOOD PRESSURE: 108 MMHG | HEIGHT: 72 IN | HEART RATE: 56 BPM | TEMPERATURE: 97.8 F

## 2018-02-14 DIAGNOSIS — R10.9 RIGHT FLANK PAIN: ICD-10-CM

## 2018-02-14 DIAGNOSIS — R10.11 RIGHT UPPER QUADRANT ABDOMINAL PAIN: ICD-10-CM

## 2018-02-14 DIAGNOSIS — R42 DIZZINESS: ICD-10-CM

## 2018-02-14 PROCEDURE — 99214 OFFICE O/P EST MOD 30 MIN: CPT | Performed by: FAMILY MEDICINE

## 2018-02-14 NOTE — PROGRESS NOTES
Subjective:      Valeriy Orlando is a 72 y.o. male who presents with Dizziness and GI Problem            HPI     Patient is here because of dizziness worse with change in positions such as getting up from a sitting position to standing. He said it lasted for about 3-4 days and then it started to get better. The dizziness has already resolved but he still doesn't feel quite back to his normal function. He has not gone back to the gym to exercise because of this. He denies any cough or cold symptoms, fever, chills, disturbances in urination. He said he was constipated and was not able to have bowel movement for 2 days and he took some stool softener/laxative and had a bowel movement yesterday and then today.    He has long-standing on and off right flank pain and right sided abdominal pain. He has been worked up for this by Dr. Ward. He had an abdominal ultrasound in September 2017 that showed increased liver echogenicity could be hepatic steatosis or cirrhosis, increased renal echogenicity could be medical renal disease and renal cyst each kidney. He also had CT of the abdomen and pelvis with and without contrast in October 2017 that showed nonspecific low-density liver lesions consider MRI, no renal stone or hydronephrosis, normal appendix, bilateral kidney cysts, pancreatic calcifications consistent with chronic pancreatitis. Patient proceeded with MRI of the abdomen with and without contrast in January 2018 that showed multiple cysts within the liver, hepatic fatty infiltration, simple renal cyst, normal gallbladder. He said he has heartburn and burping when he is changing positions such as standing. He takes omeprazole as needed. He denies any nausea, vomiting.    He has been evaluated for abnormal EKG and shortness of breath. Echocardiogram came back EF 70% with mild valvular problems, nuclear stress test came back the distal inferoapical infarct with minimal reversible perfusion but no significant ischemia. He  has been referred to the cardiologist who recommended medical management with nitroglycerin as needed for shortness of breath. He said he has not had any problems with shortness of breath so he has not used the nitroglycerin. He denies any chest pain, palpitations, leg edema.    Past medical history, past surgical history, family history reviewed-no changes    Social history reviewed-no changes    Allergies reviewed-no changes    Medications reviewed-no changes        ROS     As per history of present illness, the rest are negative       Objective:     /70   Pulse (!) 56   Temp 36.6 °C (97.8 °F)   Ht 1.829 m (6')   Wt 109.1 kg (240 lb 8.4 oz)   SpO2 94%   BMI 32.62 kg/m²      Physical Exam     Examined alert, awake, oriented, not in distress    Eyes-positive PERRLA, EOMs intact, no nystagmus  Ears-tympanic membranes intact bilaterally without evidence of infection  Face-no facial droop, tongue midline  Neck-supple, no lymphadenopathy, no thyromegaly  Lungs-clear to auscultation, no rales, no wheezes  Heart-regular rate and rhythm, no murmur  Abdomen-good bowel sounds, soft, nontender, no hepatosplenomegaly, no masses, no CVA tenderness  Extremities-no edema, clubbing, cyanosis  Neuro is exam-motor strength 5/5 upper and lower extremities, cranial nerves intact, DTRs 2+, sensation intact to light touch, negative Romberg       No visits with results within 1 Month(s) from this visit.   Latest known visit with results is:   Hospital Outpatient Visit on 12/26/2017   Component Date Value   • WBC 12/26/2017 6.0    • RBC 12/26/2017 4.60*   • Hemoglobin 12/26/2017 14.9    • Hematocrit 12/26/2017 44.5    • MCV 12/26/2017 96.7    • MCH 12/26/2017 32.4    • MCHC 12/26/2017 33.5*   • RDW 12/26/2017 49.1    • Platelet Count 12/26/2017 231    • MPV 12/26/2017 9.2    • Neutrophils-Polys 12/26/2017 53.10    • Lymphocytes 12/26/2017 29.70    • Monocytes 12/26/2017 7.30    • Eosinophils 12/26/2017 8.50*   • Basophils  12/26/2017 1.20    • Immature Granulocytes 12/26/2017 0.20    • Nucleated RBC 12/26/2017 0.00    • Neutrophils (Absolute) 12/26/2017 3.21    • Lymphs (Absolute) 12/26/2017 1.79    • Monos (Absolute) 12/26/2017 0.44    • Eos (Absolute) 12/26/2017 0.51    • Baso (Absolute) 12/26/2017 0.07    • Immature Granulocytes (a* 12/26/2017 0.01    • NRBC (Absolute) 12/26/2017 0.00    • Sodium 12/26/2017 136    • Potassium 12/26/2017 4.5    • Chloride 12/26/2017 106    • Co2 12/26/2017 23    • Anion Gap 12/26/2017 7.0    • Glucose 12/26/2017 113*   • Bun 12/26/2017 34*   • Creatinine 12/26/2017 1.29    • Calcium 12/26/2017 10.2    • AST(SGOT) 12/26/2017 21    • ALT(SGPT) 12/26/2017 25    • Alkaline Phosphatase 12/26/2017 47    • Total Bilirubin 12/26/2017 0.5    • Albumin 12/26/2017 4.2    • Total Protein 12/26/2017 6.9    • Globulin 12/26/2017 2.7    • A-G Ratio 12/26/2017 1.6    • Cholesterol,Tot 12/26/2017 184    • Triglycerides 12/26/2017 38    • HDL 12/26/2017 71    • LDL 12/26/2017 105*   • Glycohemoglobin 12/26/2017 6.0*   • Est Avg Glucose 12/26/2017 126    • GFR If  12/26/2017 >60    • GFR If Non  Ameri* 12/26/2017 55*        Assessment/Plan:     1. Dizziness  New onset problem. I reviewed previous labs. No significant problem to explain the dizziness. I will do an updated blood work including TSH, CBC, CMP. We will communicate results to patient. Advised proper hydration.  - TSH; Future  - CBC WITH DIFFERENTIAL; Future  - COMP METABOLIC PANEL; Future    2. Right upper quadrant abdominal pain  This is a long-standing problem together with the right flank pain. He has been worked up already including imaging studies such as ultrasound of the abdomen, CT of the abdomen, MRI of the abdomen with above-mentioned results without significant abnormalities. Reassured patient no abnormality in the imaging studies to explain the problem. I would think the pain is probably related to his constipation.  Advised increase by mouth fluids, increase in fiber intake.  - TSH; Future  - CBC WITH DIFFERENTIAL; Future  - COMP METABOLIC PANEL; Future    3. Right flank pain  He has been worked up extensively with different imaging studies as mentioned above without any abnormality found to explain the pain. This could be related to the back problem status post lumbar spine surgery.  - TSH; Future  - CBC WITH DIFFERENTIAL; Future  - COMP METABOLIC PANEL; Future        Please note that this dictation was created using voice recognition software. I have worked with consultants from the vendor as well as technical experts from [facility] to optimize the interface. I have made every reasonable attempt to correct obvious errors, but I expect that there are errors of grammar and possibly content I did not discover before finalizing the note.

## 2018-02-14 NOTE — PATIENT INSTRUCTIONS
Patient instructions given regarding labs, x-rays, medications, referral and followup appointment.

## 2018-02-16 ENCOUNTER — HOSPITAL ENCOUNTER (OUTPATIENT)
Dept: LAB | Facility: MEDICAL CENTER | Age: 73
End: 2018-02-16
Attending: FAMILY MEDICINE
Payer: MEDICARE

## 2018-02-16 DIAGNOSIS — R10.9 RIGHT FLANK PAIN: ICD-10-CM

## 2018-02-16 DIAGNOSIS — R42 DIZZINESS: ICD-10-CM

## 2018-02-16 DIAGNOSIS — R10.11 RIGHT UPPER QUADRANT ABDOMINAL PAIN: ICD-10-CM

## 2018-02-16 LAB
ALBUMIN SERPL BCP-MCNC: 4.1 G/DL (ref 3.2–4.9)
ALBUMIN/GLOB SERPL: 1.6 G/DL
ALP SERPL-CCNC: 51 U/L (ref 30–99)
ALT SERPL-CCNC: 21 U/L (ref 2–50)
ANION GAP SERPL CALC-SCNC: 6 MMOL/L (ref 0–11.9)
AST SERPL-CCNC: 16 U/L (ref 12–45)
BASOPHILS # BLD AUTO: 0.9 % (ref 0–1.8)
BASOPHILS # BLD: 0.05 K/UL (ref 0–0.12)
BILIRUB SERPL-MCNC: 0.6 MG/DL (ref 0.1–1.5)
BUN SERPL-MCNC: 22 MG/DL (ref 8–22)
CALCIUM SERPL-MCNC: 9.3 MG/DL (ref 8.5–10.5)
CHLORIDE SERPL-SCNC: 106 MMOL/L (ref 96–112)
CO2 SERPL-SCNC: 24 MMOL/L (ref 20–33)
CREAT SERPL-MCNC: 1.25 MG/DL (ref 0.5–1.4)
EOSINOPHIL # BLD AUTO: 0.41 K/UL (ref 0–0.51)
EOSINOPHIL NFR BLD: 7.1 % (ref 0–6.9)
ERYTHROCYTE [DISTWIDTH] IN BLOOD BY AUTOMATED COUNT: 50.7 FL (ref 35.9–50)
GLOBULIN SER CALC-MCNC: 2.5 G/DL (ref 1.9–3.5)
GLUCOSE SERPL-MCNC: 101 MG/DL (ref 65–99)
HCT VFR BLD AUTO: 45.5 % (ref 42–52)
HGB BLD-MCNC: 14.9 G/DL (ref 14–18)
IMM GRANULOCYTES # BLD AUTO: 0.02 K/UL (ref 0–0.11)
IMM GRANULOCYTES NFR BLD AUTO: 0.3 % (ref 0–0.9)
LYMPHOCYTES # BLD AUTO: 1.86 K/UL (ref 1–4.8)
LYMPHOCYTES NFR BLD: 32.3 % (ref 22–41)
MCH RBC QN AUTO: 32.3 PG (ref 27–33)
MCHC RBC AUTO-ENTMCNC: 32.7 G/DL (ref 33.7–35.3)
MCV RBC AUTO: 98.7 FL (ref 81.4–97.8)
MONOCYTES # BLD AUTO: 0.37 K/UL (ref 0–0.85)
MONOCYTES NFR BLD AUTO: 6.4 % (ref 0–13.4)
NEUTROPHILS # BLD AUTO: 3.05 K/UL (ref 1.82–7.42)
NEUTROPHILS NFR BLD: 53 % (ref 44–72)
NRBC # BLD AUTO: 0 K/UL
NRBC BLD-RTO: 0 /100 WBC
PLATELET # BLD AUTO: 213 K/UL (ref 164–446)
PMV BLD AUTO: 9.5 FL (ref 9–12.9)
POTASSIUM SERPL-SCNC: 4.3 MMOL/L (ref 3.6–5.5)
PROT SERPL-MCNC: 6.6 G/DL (ref 6–8.2)
RBC # BLD AUTO: 4.61 M/UL (ref 4.7–6.1)
SODIUM SERPL-SCNC: 136 MMOL/L (ref 135–145)
TSH SERPL DL<=0.005 MIU/L-ACNC: 1.83 UIU/ML (ref 0.38–5.33)
WBC # BLD AUTO: 5.8 K/UL (ref 4.8–10.8)

## 2018-02-16 PROCEDURE — 85025 COMPLETE CBC W/AUTO DIFF WBC: CPT

## 2018-02-16 PROCEDURE — 36415 COLL VENOUS BLD VENIPUNCTURE: CPT | Mod: GA

## 2018-02-16 PROCEDURE — 84443 ASSAY THYROID STIM HORMONE: CPT | Mod: GA

## 2018-02-16 PROCEDURE — 80053 COMPREHEN METABOLIC PANEL: CPT

## 2018-03-09 ENCOUNTER — PATIENT MESSAGE (OUTPATIENT)
Dept: MEDICAL GROUP | Facility: PHYSICIAN GROUP | Age: 73
End: 2018-03-09

## 2018-03-09 NOTE — TELEPHONE ENCOUNTER
Spoke with pt, he has mild blood. Possible due to hemorrhoids. He does not feel lightheaded. he was feeling lightheaded 2 weeks ago and at the time he saw black stools. Advised patient to go back on omeprazole and daily basis in the morning before he eats. I advised patient if he starts feeling lightheaded, huge bleed, abdominal pain, lightheadedness to go to ER   Pt verbalized understanding  Kaci Ward M.D.

## 2018-03-09 NOTE — TELEPHONE ENCOUNTER
----- Message from Valerie Allan, Med Ass't sent at 3/9/2018  9:20 AM PST -----  Regarding: FW: Non-Urgent Medical Question  Contact: [phone]  Please advise,pt thank you  ----- Message -----  From: Valeriy Orlando  Sent: 3/9/2018   9:18 AM  To: [facility]  Subject: Non-Urgent Medical Question                      I found blood in my stool today

## 2018-05-02 ENCOUNTER — TELEPHONE (OUTPATIENT)
Dept: MEDICAL GROUP | Facility: PHYSICIAN GROUP | Age: 73
End: 2018-05-02

## 2018-05-02 NOTE — TELEPHONE ENCOUNTER
[facility] WITH OUTREACH  Future Appointments       Provider Department [facility]    5/9/2018 10:40 AM Kaci Ward M.D.; [facility]  [facility]     6/13/2018 1:00 PM Kaci Ward M.D. [facility]           ANNUAL WELLNESS VISIT PRE-VISIT PLANNING     1.  Immunizations were updated in Epic using WebIZ?: Yes       •  WebIZ Recommendations: ZOSTAVAX (Shingles)       •  Is patient due for Tdap? NO       •  Is patient due for Shingles? YES. Patient was notified of copay/out of pocket cost.     2.  Specialty Comments was updated with diagnosis information provided by [facility]: NO

## 2018-05-04 DIAGNOSIS — I10 ESSENTIAL HYPERTENSION: ICD-10-CM

## 2018-05-04 RX ORDER — LISINOPRIL 10 MG/1
10 TABLET ORAL DAILY
Qty: 90 TAB | Refills: 3 | Status: SHIPPED | OUTPATIENT
Start: 2018-05-04 | End: 2019-05-22 | Stop reason: SDUPTHER

## 2018-05-09 ENCOUNTER — OFFICE VISIT (OUTPATIENT)
Dept: MEDICAL GROUP | Facility: PHYSICIAN GROUP | Age: 73
End: 2018-05-09
Payer: MEDICARE

## 2018-05-09 VITALS
OXYGEN SATURATION: 95 % | HEART RATE: 64 BPM | HEIGHT: 71 IN | WEIGHT: 232.81 LBS | RESPIRATION RATE: 16 BRPM | DIASTOLIC BLOOD PRESSURE: 70 MMHG | SYSTOLIC BLOOD PRESSURE: 122 MMHG | BODY MASS INDEX: 32.59 KG/M2 | TEMPERATURE: 98.8 F

## 2018-05-09 DIAGNOSIS — E78.00 ELEVATED LDL CHOLESTEROL LEVEL: ICD-10-CM

## 2018-05-09 DIAGNOSIS — K21.9 GASTROESOPHAGEAL REFLUX DISEASE WITHOUT ESOPHAGITIS: ICD-10-CM

## 2018-05-09 DIAGNOSIS — I10 ESSENTIAL HYPERTENSION: ICD-10-CM

## 2018-05-09 DIAGNOSIS — G47.33 OSA (OBSTRUCTIVE SLEEP APNEA): ICD-10-CM

## 2018-05-09 DIAGNOSIS — I35.1 NONRHEUMATIC AORTIC VALVE INSUFFICIENCY: ICD-10-CM

## 2018-05-09 DIAGNOSIS — Z00.00 MEDICARE ANNUAL WELLNESS VISIT, SUBSEQUENT: ICD-10-CM

## 2018-05-09 DIAGNOSIS — R94.39 ABNORMAL STRESS TEST: ICD-10-CM

## 2018-05-09 DIAGNOSIS — E66.9 OBESITY (BMI 30-39.9): ICD-10-CM

## 2018-05-09 DIAGNOSIS — R73.03 PREDIABETES: ICD-10-CM

## 2018-05-09 DIAGNOSIS — M51.36 DEGENERATION OF LUMBAR INTERVERTEBRAL DISC: ICD-10-CM

## 2018-05-09 PROBLEM — K76.9 LIVER LESION: Status: RESOLVED | Noted: 2017-12-13 | Resolved: 2018-05-09

## 2018-05-09 PROCEDURE — G0439 PPPS, SUBSEQ VISIT: HCPCS | Performed by: INTERNAL MEDICINE

## 2018-05-09 ASSESSMENT — PATIENT HEALTH QUESTIONNAIRE - PHQ9: CLINICAL INTERPRETATION OF PHQ2 SCORE: 0

## 2018-05-09 ASSESSMENT — ACTIVITIES OF DAILY LIVING (ADL): BATHING_REQUIRES_ASSISTANCE: 0

## 2018-05-09 NOTE — ASSESSMENT & PLAN NOTE
Possible false positive per cardiology. Pt has no chest pain if he ride bicycle, exercise. He has sob only when he walks. He believes it is from acid reflux. Follow up with cardiology

## 2018-05-09 NOTE — ASSESSMENT & PLAN NOTE
He report slight worse, almost constant burning sensation, cannot eat anymore fried food, spicy food. See previous note history of King esophageus. EGD 2014. Repeat 2019?? He is taking omeprazole regular, but still he is symptomatic.

## 2018-05-09 NOTE — PROGRESS NOTES
.......  Chief Complaint   Patient presents with   • Annual Wellness Visit         HPI:  Valeriy is a 72 y.o. here for Medicare Annual Wellness Visit        Patient Active Problem List    Diagnosis Date Noted   • Elevated LDL cholesterol level 05/09/2018   • Nonrheumatic aortic valve insufficiency 09/13/2017   • Essential hypertension 05/03/2017   • Prediabetes 05/03/2017   • Gastroesophageal reflux disease without esophagitis 05/03/2017   • Obesity (BMI 30-39.9) 05/03/2017   • Abnormal stress test 05/03/2017   • MAVERICK (obstructive sleep apnea) 05/03/2017   • Degeneration of lumbar intervertebral disc 11/29/2016       Current Outpatient Prescriptions   Medication Sig Dispense Refill   • lisinopril (PRINIVIL) 10 MG Tab TAKE 1 TAB BY MOUTH EVERY DAY. 90 Tab 3   • nitroglycerin (NITROSTAT) 0.4 MG SL Tab Place 1 Tab under tongue as needed for Chest Pain. 25 Tab 3   • aspirin 81 MG tablet Take 81 mg by mouth every day.     • omeprazole (PRILOSEC) 20 MG delayed-release capsule Take 1 Cap by mouth as needed (For heartburn). 90 Cap 3   • MELATONIN PO Take 3 Tabs by mouth at bedtime as needed (For sleep).       No current facility-administered medications for this visit.         Patient is taking medications as noted in medication list.  Current supplements as per medication list.     Allergies: Patient has no known allergies.    Current social contact/activities: dinners out travel   Patient's perception of their health: satisfactory    Is patient current with immunizations? No, due for ZOSTAVAX (Shingles). Patient is interested in receiving NONE today.    He  reports that he has never smoked. He has never used smokeless tobacco. He reports that he drinks about 0.6 oz of alcohol per week . He reports that he does not use drugs.  Counseling given: Not Answered        DPA/Advanced directive: Patient has Advanced Directive on file.     ROS:    Gait: Uses no assistive device   Ostomy: No  Other tubes: No   Amputations: No   Chronic  oxygen use: No  Last eye exam: 2008   Wears hearing aids: No   : Denies any urinary leakage during the last 6 months      Screening:    Little interest or pleasure in doing things?  0 - not at all  Feeling down, depressed, or hopeless? 0 - not at all  Patient Health Questionnaire Score: 0    If depressive symptoms identified deferred to follow up visit unless specifically addressed in assessment and plan.    Interpretation of PHQ-9 Total Score   Score Severity   1-4 No Depression   5-9 Mild Depression   10-14 Moderate Depression   15-19 Moderately Severe Depression   20-27 Severe Depression    Screening for Cognitive Impairment  Three Minute Recall (apple, watch, randy)  3/3 Banana, [place], Fence  Draw clock face with all 12 numbers set to the hand to show 10 minutes past 11 o'clock  1 Time 10:10  5/5  If cognitive concerns identified, deferred for follow up unless specifically addressed in assessment and plan.    Fall Risk Assessment  Has the patient had two or more falls in the last year or any fall with injury in the last year?  No  If fall risk identified, deferred for follow up unless specifically addressed in assessment and plan.    Safety Assessment  Throw rugs on floor.  No  Handrails on all stairs.  No  Good lighting in all hallways.  Yes  Difficulty hearing.  No  Patient counseled about all safety risks that were identified.    Functional Assessment ADLs  Are there any barriers preventing you from cooking for yourself or meeting nutritional needs?  No.    Are there any barriers preventing you from driving safely or obtaining transportation?  No.    Are there any barriers preventing you from using a telephone or calling for help?  No.    Are there any barriers preventing you from shopping?  No.    Are there any barriers preventing you from taking care of your own finances?  No.    Are there any barriers preventing you from managing your medications?  No.    Are there any barriers preventing you from  showering, bathing or dressing yourself?  No.    Are you currently engaging any exercise or physical activity?  Yes.  Bike  for a hour     Health Maintenance Summary                Annual Wellness Visit Overdue 1945     IMM PNEUMOCOCCAL 65+ (ADULT) LOW/MEDIUM RISK SERIES Next Due 5/10/2018      Done 5/10/2017 Imm Admin: Pneumococcal Conjugate Vaccine (Prevnar/PCV-13)     Patient has more history with this topic...    COLONOSCOPY Next Due 1/2/2024      Done 1/2/2014 REFERRAL TO GI FOR COLONOSCOPY    IMM DTaP/Tdap/Td Vaccine Next Due 5/10/2027      Done 5/10/2017 Imm Admin: Tdap Vaccine     Patient has more history with this topic...          Patient Care Team:  Kaci Ward M.D. as PCP - General (Internal Medicine)    Social History   Substance Use Topics   • Smoking status: Never Smoker   • Smokeless tobacco: Never Used   • Alcohol use 0.6 oz/week     1 Glasses of wine per week      Comment: 25 per month/ 1 glass wine per day     Family History   Problem Relation Age of Onset   • Heart Disease Mother    • Cancer Mother      lung cancer   • Heart Disease Maternal Grandmother    • Diabetes Paternal Grandfather      He  has a past medical history of Depression; Heart burn; Hepatitis A (2001); Hypertension; Prediabetes; Sleep apnea; and Snoring.   Past Surgical History:   Procedure Laterality Date   • LUMBAR LAMINECTOMY DISKECTOMY  11/29/2016    Procedure: LUMBAR LAMINECTOMY DISKECTOMY L3-5 open laminectomy ;  Surgeon: Bentley Claudio M.D.;  Location: SURGERY [facility];  Service:    • OTHER ORTHOPEDIC SURGERY  1990    right knee scope   • GROIN EXPLORATION      growth   • KNEE ARTHROSCOPY           Exam:   Resp. rate 16. There is no height or weight on file to calculate BMI.    Hearing good.    Dentition good  Alert, oriented in no acute distress.  Eye contact is good, speech goal directed, affect calm      Assessment and Plan. The following treatment and monitoring plan is recommended:      Abnormal stress  test  Possible false positive per cardiology. Pt has no chest pain if he ride bicycle, exercise. He has sob only when he walks. He believes it is from acid reflux. Follow up with cardiology    Degeneration of lumbar intervertebral disc  He still has some low back pain, flank pain on the right side. He is continuing exercise. Pain not interfering with quality of life   s/p laminectomy 01/2017     Elevated LDL cholesterol level  Continue to monitor . Chronic     Essential hypertension  On lisinopril, well controled. No side effects. Tolerate current regimen well.     Gastroesophageal reflux disease without esophagitis  He report slight worse, almost constant burning sensation, cannot eat anymore fried food, spicy food. See previous note history of [place] esophageus. EGD 2014. Repeat 2019?? He is taking omeprazole regular, but still he is symptomatic.     Nonrheumatic aortic valve insufficiency  Mild, echo 10/2017 . Not symptomatic. Repeat in 5 years     Obesity (BMI 30-39.9)  He is losing weight. Counseling for a small portion, balanced diet, exercising for3-5 times per week.      MAVERICK (obstructive sleep apnea)  He keep CPAP machine. Compliant. He follows with [facility].     Prediabetes  Continue to monitor. No changes,         Services suggested: No services needed at this time  Health Care Screening: Age-appropriate preventive services recommended by USPTF and ACIP covered by Medicare were discussed today. Services ordered if indicated and agreed upon by the patient.  Referrals offered: PT/OT/Nutrition counseling/Behavioral Health/Smoking cessation as per orders if indicated.    Discussion today about general wellness and lifestyle habits:    · Prevent falls and reduce trip hazards; Cautioned about securing or removing rugs.  · Have a working fire alarm and carbon monoxide detector;   · Engage in regular physical activity and social activities     Follow-up: Return in about 6 months (around 11/9/2018), or if symptoms  worsen or fail to improve, for Short.

## 2018-05-09 NOTE — ASSESSMENT & PLAN NOTE
He is losing weight. Counseling for a small portion, balanced diet, exercising for3-5 times per week.

## 2018-05-09 NOTE — ASSESSMENT & PLAN NOTE
He still has some low back pain, flank pain on the right side. He is continuing exercise. Pain not interfering with quality of life   s/p laminectomy 01/2017

## 2018-05-17 ENCOUNTER — OFFICE VISIT (OUTPATIENT)
Dept: URGENT CARE | Facility: CLINIC | Age: 73
End: 2018-05-17
Payer: MEDICARE

## 2018-05-17 ENCOUNTER — APPOINTMENT (OUTPATIENT)
Dept: RADIOLOGY | Facility: IMAGING CENTER | Age: 73
End: 2018-05-17
Attending: PHYSICIAN ASSISTANT
Payer: MEDICARE

## 2018-05-17 ENCOUNTER — HOSPITAL ENCOUNTER (OUTPATIENT)
Dept: LAB | Facility: MEDICAL CENTER | Age: 73
End: 2018-05-17
Attending: INTERNAL MEDICINE
Payer: MEDICARE

## 2018-05-17 VITALS
WEIGHT: 220 LBS | SYSTOLIC BLOOD PRESSURE: 102 MMHG | HEART RATE: 88 BPM | BODY MASS INDEX: 29.8 KG/M2 | OXYGEN SATURATION: 98 % | DIASTOLIC BLOOD PRESSURE: 58 MMHG | HEIGHT: 72 IN | TEMPERATURE: 100 F

## 2018-05-17 DIAGNOSIS — E78.00 ELEVATED LDL CHOLESTEROL LEVEL: ICD-10-CM

## 2018-05-17 DIAGNOSIS — M71.022 OLECRANON BURSA ABSCESS, LEFT: Primary | ICD-10-CM

## 2018-05-17 DIAGNOSIS — S50.02XA CONTUSION OF LEFT ELBOW, INITIAL ENCOUNTER: ICD-10-CM

## 2018-05-17 DIAGNOSIS — I10 ESSENTIAL HYPERTENSION: ICD-10-CM

## 2018-05-17 DIAGNOSIS — R73.03 PREDIABETES: ICD-10-CM

## 2018-05-17 DIAGNOSIS — K21.9 GASTROESOPHAGEAL REFLUX DISEASE WITHOUT ESOPHAGITIS: ICD-10-CM

## 2018-05-17 LAB
ALBUMIN SERPL BCP-MCNC: 4.1 G/DL (ref 3.2–4.9)
ALBUMIN/GLOB SERPL: 1.5 G/DL
ALP SERPL-CCNC: 57 U/L (ref 30–99)
ALT SERPL-CCNC: 23 U/L (ref 2–50)
ANION GAP SERPL CALC-SCNC: 7 MMOL/L (ref 0–11.9)
AST SERPL-CCNC: 14 U/L (ref 12–45)
BASOPHILS # BLD AUTO: 0.6 % (ref 0–1.8)
BASOPHILS # BLD: 0.05 K/UL (ref 0–0.12)
BILIRUB SERPL-MCNC: 0.4 MG/DL (ref 0.1–1.5)
BUN SERPL-MCNC: 24 MG/DL (ref 8–22)
CALCIUM SERPL-MCNC: 9.5 MG/DL (ref 8.5–10.5)
CHLORIDE SERPL-SCNC: 106 MMOL/L (ref 96–112)
CHOLEST SERPL-MCNC: 159 MG/DL (ref 100–199)
CO2 SERPL-SCNC: 24 MMOL/L (ref 20–33)
CREAT SERPL-MCNC: 1.16 MG/DL (ref 0.5–1.4)
EOSINOPHIL # BLD AUTO: 0.58 K/UL (ref 0–0.51)
EOSINOPHIL NFR BLD: 6.6 % (ref 0–6.9)
ERYTHROCYTE [DISTWIDTH] IN BLOOD BY AUTOMATED COUNT: 50.6 FL (ref 35.9–50)
EST. AVERAGE GLUCOSE BLD GHB EST-MCNC: 131 MG/DL
GLOBULIN SER CALC-MCNC: 2.7 G/DL (ref 1.9–3.5)
GLUCOSE SERPL-MCNC: 120 MG/DL (ref 65–99)
HBA1C MFR BLD: 6.2 % (ref 0–5.6)
HCT VFR BLD AUTO: 43.4 % (ref 42–52)
HDLC SERPL-MCNC: 72 MG/DL
HGB BLD-MCNC: 14.6 G/DL (ref 14–18)
IMM GRANULOCYTES # BLD AUTO: 0.04 K/UL (ref 0–0.11)
IMM GRANULOCYTES NFR BLD AUTO: 0.5 % (ref 0–0.9)
LDLC SERPL CALC-MCNC: 77 MG/DL
LYMPHOCYTES # BLD AUTO: 1.76 K/UL (ref 1–4.8)
LYMPHOCYTES NFR BLD: 20.1 % (ref 22–41)
MCH RBC QN AUTO: 33 PG (ref 27–33)
MCHC RBC AUTO-ENTMCNC: 33.6 G/DL (ref 33.7–35.3)
MCV RBC AUTO: 98 FL (ref 81.4–97.8)
MONOCYTES # BLD AUTO: 0.7 K/UL (ref 0–0.85)
MONOCYTES NFR BLD AUTO: 8 % (ref 0–13.4)
NEUTROPHILS # BLD AUTO: 5.61 K/UL (ref 1.82–7.42)
NEUTROPHILS NFR BLD: 64.2 % (ref 44–72)
NRBC # BLD AUTO: 0 K/UL
NRBC BLD-RTO: 0 /100 WBC
PLATELET # BLD AUTO: 266 K/UL (ref 164–446)
PMV BLD AUTO: 9.6 FL (ref 9–12.9)
POTASSIUM SERPL-SCNC: 4.2 MMOL/L (ref 3.6–5.5)
PROT SERPL-MCNC: 6.8 G/DL (ref 6–8.2)
RBC # BLD AUTO: 4.43 M/UL (ref 4.7–6.1)
SODIUM SERPL-SCNC: 137 MMOL/L (ref 135–145)
TRIGL SERPL-MCNC: 49 MG/DL (ref 0–149)
WBC # BLD AUTO: 8.7 K/UL (ref 4.8–10.8)

## 2018-05-17 PROCEDURE — 99213 OFFICE O/P EST LOW 20 MIN: CPT | Performed by: PHYSICIAN ASSISTANT

## 2018-05-17 PROCEDURE — 85025 COMPLETE CBC W/AUTO DIFF WBC: CPT

## 2018-05-17 PROCEDURE — 80053 COMPREHEN METABOLIC PANEL: CPT

## 2018-05-17 PROCEDURE — 36415 COLL VENOUS BLD VENIPUNCTURE: CPT | Mod: GA

## 2018-05-17 PROCEDURE — 73080 X-RAY EXAM OF ELBOW: CPT | Mod: TC,LT | Performed by: PHYSICIAN ASSISTANT

## 2018-05-17 PROCEDURE — 83036 HEMOGLOBIN GLYCOSYLATED A1C: CPT | Mod: GA

## 2018-05-17 PROCEDURE — 80061 LIPID PANEL: CPT

## 2018-05-17 RX ORDER — SULFAMETHOXAZOLE AND TRIMETHOPRIM 800; 160 MG/1; MG/1
1 TABLET ORAL 2 TIMES DAILY
Qty: 20 TAB | Refills: 0 | Status: SHIPPED | OUTPATIENT
Start: 2018-05-17 | End: 2018-05-27

## 2018-05-18 NOTE — PROGRESS NOTES
Subjective:      Pt is a 72 y.o. male who presents with Elbow Injury (x2 weeks ago hit it on a 2x4 )            HPI  Pt notes he slammed his left elbow into a pole 4 days ago and then again into another hard object 2 days ago and notes left elbow swelling, redness and TTP. Pt has not taken any Rx medications for this condition. Pt states the pain is a 4/10, aching in nature and worse at night. Pt denies CP, SOB, NVD, paresthesias, headaches, dizziness, change in vision, hives, or other joint pain. The pt's medication list, problem list, and allergies have been evaluated and reviewed during today's visit.    .  PMH:  Past Medical History:   Diagnosis Date   • Depression     pre diabetes   • Heart burn    • Hepatitis A    • Hypertension    • Prediabetes    • Sleep apnea     cpap in use   • Snoring        PSH:  Past Surgical History:   Procedure Laterality Date   • LUMBAR LAMINECTOMY DISKECTOMY  2016    Procedure: LUMBAR LAMINECTOMY DISKECTOMY L3-5 open laminectomy ;  Surgeon: Bentley Claudio M.D.;  Location: SURGERY [facility];  Service:    • OTHER ORTHOPEDIC SURGERY      right knee scope   • GROIN EXPLORATION      growth   • KNEE ARTHROSCOPY         Fam Hx:    family history includes Cancer in his mother; Diabetes in his paternal grandfather; Heart Disease in his maternal grandmother and mother.  Family Status   Relation Status   • Mother    • Father    • Sister Alive   • Sister Alive   • Maternal Grandmother    • Paternal Grandfather        Soc HX:  Social History     Social History   • Marital status: Single     Spouse name: N/A   • Number of children: N/A   • Years of education: N/A     Occupational History   • Not on file.     Social History Main Topics   • Smoking status: Never Smoker   • Smokeless tobacco: Never Used   • Alcohol use 0.6 oz/week     1 Glasses of wine per week      Comment: 25 per month/ 1 glass wine per day   • Drug use: No   • Sexual activity: Yes     Partners:  Female     Other Topics Concern   • Not on file     Social History Narrative   • No narrative on file         Medications:    Current Outpatient Prescriptions:   •  sulfamethoxazole-trimethoprim (BACTRIM DS) 800-160 MG tablet, Take 1 Tab by mouth 2 times a day for 10 days., Disp: 20 Tab, Rfl: 0  •  lisinopril (PRINIVIL) 10 MG Tab, TAKE 1 TAB BY MOUTH EVERY DAY., Disp: 90 Tab, Rfl: 3  •  aspirin 81 MG tablet, Take 81 mg by mouth every day., Disp: , Rfl:   •  omeprazole (PRILOSEC) 20 MG delayed-release capsule, Take 1 Cap by mouth as needed (For heartburn)., Disp: 90 Cap, Rfl: 3  •  nitroglycerin (NITROSTAT) 0.4 MG SL Tab, Place 1 Tab under tongue as needed for Chest Pain., Disp: 25 Tab, Rfl: 3  •  MELATONIN PO, Take 3 Tabs by mouth at bedtime as needed (For sleep)., Disp: , Rfl:       Allergies:  Ibuprofen    ROS  Constitutional: Negative for fever, chills and malaise/fatigue.   HENT: Negative for congestion and sore throat.    Eyes: Negative for blurred vision, double vision and photophobia.   Respiratory: Negative for cough and shortness of breath.    Cardiovascular: Negative for chest pain and palpitations.   Gastrointestinal: Negative for heartburn, nausea, vomiting, abdominal pain, diarrhea and constipation.   Genitourinary: Negative for dysuria and flank pain.   Musculoskeletal: POS for left elbow joint pain and myalgias.   Skin: Negative for itching and rash.   Neurological: Negative for dizziness, tingling and headaches.   Endo/Heme/Allergies: Does not bruise/bleed easily.   Psychiatric/Behavioral: Negative for depression. The patient is not nervous/anxious.           Objective:     /58   Pulse 88   Temp 37.8 °C (100 °F)   Ht 1.829 m (6')   Wt 99.8 kg (220 lb)   SpO2 98%   BMI 29.84 kg/m²      Physical Exam   Musculoskeletal:        Left elbow: He exhibits decreased range of motion and swelling. He exhibits no effusion, no deformity and no laceration. Tenderness found. Olecranon process tenderness  noted.        Arms:         Constitutional: PT is oriented to person, place, and time. PT appears well-developed and well-nourished. No distress.   HENT:   Head: Normocephalic and atraumatic.   Mouth/Throat: Oropharynx is clear and moist. No oropharyngeal exudate.   Eyes: Conjunctivae normal and EOM are normal. Pupils are equal, round, and reactive to light.   Neck: Normal range of motion. Neck supple. No thyromegaly present.   Cardiovascular: Normal rate, regular rhythm, normal heart sounds and intact distal pulses.  Exam reveals no gallop and no friction rub.    No murmur heard.  Pulmonary/Chest: Effort normal and breath sounds normal. No respiratory distress. PT has no wheezes. PT has no rales. Pt exhibits no tenderness.   Abdominal: Soft. Bowel sounds are normal. PT exhibits no distension and no mass. There is no tenderness. There is no rebound and no guarding.   Neurological: PT is alert and oriented to person, place, and time. PT has normal reflexes. No cranial nerve deficit.   Skin: Skin is warm and dry. No rash noted. PT is not diaphoretic. No erythema.       Psychiatric: PT has a normal mood and affect. PT behavior is normal. Judgment and thought content normal.     RADS:  Narrative       5/17/2018 11:43 AM    HISTORY/REASON FOR EXAM:  Pain/Deformity Following Trauma  Rectal pain after injury 2 weeks ago    TECHNIQUE/EXAM DESCRIPTION AND NUMBER OF VIEWS:  3 views of the LEFT elbow.    COMPARISON: None    FINDINGS:  There is no evidence of joint effusion.  There is no evidence of displaced fracture or dislocation.  There is soft tissue swelling.    There is olecranon spurring.   Impression       Soft tissue swelling without evidence of bony injury.   Reading Provider Reading Date   Barney Yanez M.D. May 17, 2018   Signing Provider Signing Date Signing Time   Barney Yanez M.D. May 17, 2018 12:35 PM          Assessment/Plan:     1. Olecranon bursa abscess, left    - sulfamethoxazole-trimethoprim  (BACTRIM DS) 800-160 MG tablet; Take 1 Tab by mouth 2 times a day for 10 days.  Dispense: 20 Tab; Refill: 0    2. Contusion of left elbow, initial encounter    - DX-ELBOW-COMPLETE 3+ LEFT; Future    Ace wrap placed to left elbow  RICE therapy discussed  Gentle ROM exercises discussed  WBAT left UE  Ice/heat therapy discussed  OTC ibuprofen for pain control  Rest, fluids encouraged.  AVS with medical info given.  Pt was in full understanding and agreement with the plan.  Follow-up as needed if symptoms worsen or fail to improve.

## 2018-06-06 RX ORDER — OMEPRAZOLE 20 MG/1
20 CAPSULE, DELAYED RELEASE ORAL PRN
Qty: 90 CAP | Refills: 3 | Status: SHIPPED | OUTPATIENT
Start: 2018-06-06 | End: 2019-06-24 | Stop reason: SDUPTHER

## 2018-06-12 ENCOUNTER — OFFICE VISIT (OUTPATIENT)
Dept: MEDICAL GROUP | Facility: PHYSICIAN GROUP | Age: 73
End: 2018-06-12
Payer: MEDICARE

## 2018-06-12 VITALS
OXYGEN SATURATION: 93 % | SYSTOLIC BLOOD PRESSURE: 116 MMHG | BODY MASS INDEX: 32.01 KG/M2 | HEART RATE: 73 BPM | TEMPERATURE: 98.1 F | WEIGHT: 236.33 LBS | HEIGHT: 72 IN | DIASTOLIC BLOOD PRESSURE: 60 MMHG

## 2018-06-12 DIAGNOSIS — R10.9 RIGHT FLANK PAIN: ICD-10-CM

## 2018-06-12 LAB
APPEARANCE UR: NORMAL
BILIRUB UR STRIP-MCNC: NORMAL MG/DL
COLOR UR AUTO: NORMAL
GLUCOSE UR STRIP.AUTO-MCNC: NORMAL MG/DL
KETONES UR STRIP.AUTO-MCNC: NORMAL MG/DL
LEUKOCYTE ESTERASE UR QL STRIP.AUTO: NORMAL
NITRITE UR QL STRIP.AUTO: NORMAL
PH UR STRIP.AUTO: 5.5 [PH] (ref 5–8)
PROT UR QL STRIP: NORMAL MG/DL
RBC UR QL AUTO: NORMAL
SP GR UR STRIP.AUTO: 1.02
UROBILINOGEN UR STRIP-MCNC: 0.2 MG/DL

## 2018-06-12 PROCEDURE — 99213 OFFICE O/P EST LOW 20 MIN: CPT | Performed by: FAMILY MEDICINE

## 2018-06-12 PROCEDURE — 81002 URINALYSIS NONAUTO W/O SCOPE: CPT | Performed by: FAMILY MEDICINE

## 2018-06-13 NOTE — PROGRESS NOTES
Subjective:      Valeriy Orlando is a 72 y.o. male who presents with Pain (side right)          HPI    This is a 72-year-old white male patient of Dr. Ward who is here complaining of pain on his right side/right flank for about 2 weeks now. The pain is worse with movement. This is sharp in character and is on and off lasting for about 1-1/2 hours. He said he has been having long-standing problems with frequency of urination about every hour during the day and not night but this is not a new problem. He thinks this is because he drinks plenty of water. Patient has chronic low back pain post lumbar spine surgery in 11/16. He sees his back surgeon for this problem. The right flank pain he is having now is different from his back pain. His back pain is located in the midline without radiation to the legs. No numbness or tingling down the legs. He has no disturbances in bowel movement. He denies any blood in the urine. He has no history of kidney stones. He denies any fever or chills.    He denies any recent trauma. He denies any recent strenuous activity.    Past medical history, past surgical history, family history reviewed-no changes    Social history reviewed-no changes    Allergies reviewed-no changes    Medications reviewed-no changes            ROS     As per history of present illness, the rest are negative.         Objective:     /60   Pulse 73   Temp 36.7 °C (98.1 °F)   Ht 1.829 m (6')   Wt 107.2 kg (236 lb 5.3 oz)   SpO2 93%   BMI 32.05 kg/m²      Physical Exam     Examined alert, awake, oriented, not in distress    Neck-supple, no lymphadenopathy, no thyromegaly  Lungs-clear to auscultation, no rales, no wheezes  Heart-regular rate and rhythm, no murmur  Abdomen-good bowel sounds, soft, nontender, no hepatosplenomegaly, no masses, no CVA tenderness  Extremities-no edema, clubbing, cyanosis     Urine dipstick       Component Results     Component Value Ref Range & Units Status   POC Color yl   Negative Final   POC Appearance clr  Negative Final   POC Leukocyte Esterase neg  Negative Final   POC Nitrites neg  Negative Final   POC Urobiligen 0.2  Negative (0.2) mg/dL Final   POC Protein neg  Negative mg/dL Final   POC Urine PH 5.5  5.0 - 8.0 Final   POC Blood neg  Negative Final   POC Specific Gravity 1.020  <1.005 - >1.030 Final   POC Ketones neg  Negative mg/dL Final   POC Bilirubin neg  Negative mg/dL Final   POC Glucose neg  Negative mg/dL Final   Last Resulted Time   Tue Jun 12, 2018  3:12 PM       Assessment/Plan:     1. Right flank pain  Urine dipstick did not show any evidence of UTI. No blood in the urine that would make us suspect kidney stones. Patient reassured pain is not due to UTI or any kidney problems. This could be musculoskeletal pain may be coming from his back. He could have pulled a muscle in this area. Advised warm compresses, Tylenol as needed for pain. He will follow-up with his back surgeon because this may be related to his lumbar spine problem. Advised rest and avoidance of any strenuous activity for now.  - POCT Urinalysis    2. BMI 32.0-32.9,adult  Discussed diet, exercise and weight loss.  - Patient identified as having weight management issue.  Appropriate orders and counseling given.    Please note that this dictation was created using voice recognition software. I have worked with consultants from the vendor as well as technical experts from [employer] [facility]  [employer] to optimize the interface. I have made every reasonable attempt to correct obvious errors, but I expect that there are errors of grammar and possibly content I did not discover before finalizing the note.

## 2018-07-06 ENCOUNTER — OFFICE VISIT (OUTPATIENT)
Dept: MEDICAL GROUP | Facility: PHYSICIAN GROUP | Age: 73
End: 2018-07-06
Payer: MEDICARE

## 2018-07-06 VITALS
HEART RATE: 64 BPM | WEIGHT: 236 LBS | BODY MASS INDEX: 31.97 KG/M2 | OXYGEN SATURATION: 98 % | HEIGHT: 72 IN | DIASTOLIC BLOOD PRESSURE: 78 MMHG | RESPIRATION RATE: 16 BRPM | TEMPERATURE: 99 F | SYSTOLIC BLOOD PRESSURE: 124 MMHG

## 2018-07-06 DIAGNOSIS — M54.50 CHRONIC RIGHT-SIDED LOW BACK PAIN WITHOUT SCIATICA: ICD-10-CM

## 2018-07-06 DIAGNOSIS — E66.9 OBESITY (BMI 30-39.9): ICD-10-CM

## 2018-07-06 DIAGNOSIS — G89.29 CHRONIC RIGHT-SIDED LOW BACK PAIN WITHOUT SCIATICA: ICD-10-CM

## 2018-07-06 DIAGNOSIS — K21.9 GASTROESOPHAGEAL REFLUX DISEASE WITHOUT ESOPHAGITIS: ICD-10-CM

## 2018-07-06 PROCEDURE — 99214 OFFICE O/P EST MOD 30 MIN: CPT | Performed by: PHYSICIAN ASSISTANT

## 2018-07-06 ASSESSMENT — PAIN SCALES - GENERAL: PAINLEVEL: 3=SLIGHT PAIN

## 2018-07-06 NOTE — ASSESSMENT & PLAN NOTE
Chronic condition. A states for the past 3 months she's been experiencing right lower back pain. Pain is a constant low-grade aching pain that is non radiating. Pain on a scale from 1-10 is a 2-3.  He tells me that he is still able to exercise. States every day he will do back stretches and riding stationary for 60 minutes. He mentions that sleeping, standing and sitting aggravate symptoms. States leaning forward to help alleviate symptoms. Positive medical history for spinal stenosis. States he's been seen by other providers in regards to symptoms and kidney disease has been ruled out. States he is following up with Dr. Claudio at [facility] on 7/09/18 for further evaluation of symptoms.  She has a positive surgical history for lumbar laminectomy discectomy in 2016. He tells me that he's been using CBD cream on lower back with alleviation of symptoms. States alleviation is temporary. States he's also been using a heating pad with moderate alleviation symptoms. Denies muscle ache, muscle weakness, sciatica, saddle paresthesia, incontinence.

## 2018-07-06 NOTE — ASSESSMENT & PLAN NOTE
Chronic condition. Patient is currently prescribed Prilosec 20 mg delayed release capsule and advised to take one By mouth every morning. He tells me that he occasionally forgets to take medication. States he's been experiencing heartburn symptoms recently. Admits to snacking late at night. Drinks 2 cups of coffee every morning and green tea in the afternoon. Denies daily alcohol use. Denies eating large meals. He admits to occasionally lying down after eating. Denies hoarseness of voice, difficulty swallowing or cough.

## 2018-07-07 NOTE — ASSESSMENT & PLAN NOTE
States he is working on diet. Eating smaller portions. He tells me that he has a regular exercise routine to consist of strength training and cardiovascular.

## 2018-07-07 NOTE — PROGRESS NOTES
Chief Complaint   Patient presents with   • Flank Pain     RT side       HISTORY OF PRESENT ILLNESS: Valeriy Orlando is an established 72 y.o. male here to discuss the evaluation and management of:    Chronic right-sided low back pain without sciatica  Chronic condition. A states for the past 3 months she's been experiencing right lower back pain. Pain is a constant low-grade aching pain that is non radiating. Pain on a scale from 1-10 is a 2-3.  He tells me that he is still able to exercise. States every day he will do back stretches and riding stationary for 60 minutes. He mentions that sleeping, standing and sitting aggravate symptoms. States leaning forward to help alleviate symptoms. Positive medical history for spinal stenosis. States he's been seen by other providers in regards to symptoms and kidney disease has been ruled out. States he is following up with Dr. Claudio at [facility] on 7/09/18 for further evaluation of symptoms.  She has a positive surgical history for lumbar laminectomy discectomy in 2016. He tells me that he's been using CBD cream on lower back with alleviation of symptoms. States alleviation is temporary. States he's also been using a heating pad with moderate alleviation symptoms. Denies muscle ache, muscle weakness, sciatica, saddle paresthesia, incontinence.    Gastroesophageal reflux disease without esophagitis  Chronic condition. Patient is currently prescribed Prilosec 20 mg delayed release capsule and advised to take one By mouth every morning. He tells me that he occasionally forgets to take medication. States he's been experiencing heartburn symptoms recently. Admits to snacking late at night. Drinks 2 cups of coffee every morning and green tea in the afternoon. Denies daily alcohol use. Denies eating large meals. He admits to occasionally lying down after eating. Denies hoarseness of voice, difficulty swallowing or cough.    Obesity (BMI 30-39.9)  States he is working on diet.  Eating smaller portions. He tells me that he has a regular exercise routine to consist of strength training and cardiovascular.      Patient Active Problem List    Diagnosis Date Noted   • Chronic right-sided low back pain without sciatica 2018   • Elevated LDL cholesterol level 2018   • Nonrheumatic aortic valve insufficiency 2017   • Essential hypertension 2017   • Prediabetes 2017   • Gastroesophageal reflux disease without esophagitis 2017   • Obesity (BMI 30-39.9) 2017   • Abnormal stress test 2017   • MAVERICK (obstructive sleep apnea) 2017   • Degeneration of lumbar intervertebral disc 2016       Allergies:Ibuprofen    Current Outpatient Prescriptions   Medication Sig Dispense Refill   • omeprazole (PRILOSEC) 20 MG delayed-release capsule TAKE 1 CAP BY MOUTH AS NEEDED (FOR HEARTBURN). 90 Cap 3   • lisinopril (PRINIVIL) 10 MG Tab TAKE 1 TAB BY MOUTH EVERY DAY. 90 Tab 3   • nitroglycerin (NITROSTAT) 0.4 MG SL Tab Place 1 Tab under tongue as needed for Chest Pain. 25 Tab 3   • aspirin 81 MG tablet Take 81 mg by mouth every day.       No current facility-administered medications for this visit.        Social History   Substance Use Topics   • Smoking status: Never Smoker   • Smokeless tobacco: Never Used   • Alcohol use 0.6 oz/week     1 Glasses of wine per week      Comment: 1 glass wine per week        Family Status   Relation Status   • Mother    • Father    • Sister Alive   • Sister Alive   • Maternal Grandmother    • Paternal Grandfather      Family History   Problem Relation Age of Onset   • Heart Disease Mother    • Cancer Mother      lung cancer   • Heart Disease Maternal Grandmother    • Diabetes Paternal Grandfather        ROS:  Review of Systems   Constitutional: Negative for fever, chills, weight loss and malaise/fatigue.   HENT: Negative for ear pain, nosebleeds, congestion, sore throat and neck pain.    Eyes: Negative for blurred  vision.   Respiratory: Negative for cough, sputum production, shortness of breath and wheezing.    Cardiovascular: Negative for chest pain, palpitations, orthopnea and leg swelling.   Gastrointestinal: Negative for heartburn, nausea, vomiting and abdominal pain. + for heartburn.  Genitourinary: Negative for dysuria, urgency and frequency.   Musculoskeletal: Negative for myalgias and joint pain. Positive for right lower back pain.  Skin: Negative for rash and itching.   Neurological: Negative for dizziness, tingling, tremors, sensory change, focal weakness and headaches.   Endo/Heme/Allergies: Does not bruise/bleed easily.   Psychiatric/Behavioral: Negative for depression, suicidal ideas and memory loss.  The patient is not nervous/anxious and does not have insomnia.    All other systems reviewed and are negative except as in HPI.    Exam: Blood pressure 124/78, pulse 64, temperature 37.2 °C (99 °F), resp. rate 16, height 1.829 m (6'), weight 107 kg (236 lb), SpO2 98 %. Body mass index is 32.01 kg/m².  General: Normal appearing. No distress.  HEENT: Normocephalic. Eyes conjunctiva clear lids without ptosis, pupils equal and reactive to light accommodation, ears normal shape and contour, canals are clear bilaterally, tympanic membranes are benign, nasal mucosa benign, oropharynx is without erythema, edema or exudates.   Neck: Supple without JVD or bruit. Thyroid is not enlarged.  Pulmonary: Clear to ausculation.  Normal effort. No rales, ronchi, or wheezing.  Cardiovascular: Regular rate and rhythm without murmur. Carotid and radial pulses are intact and equal bilaterally.  Abdomen: Soft, nontender, nondistended. Normal bowel sounds. Liver and spleen are not palpable  Neurologic: Grossly nonfocal.  Cranial nerves are normal. DTR's normal and symmetric.  Lymph: No cervical, supraclavicular or axillary lymph nodes are palpable  Skin: Warm and dry.  No rashes or suspicious skin lesions.  Musculoskeletal: Normal gait. No  extremity cyanosis, clubbing, or edema. Right lower back is tender to deep palpation. Normal range of motion of 4 extremities.   Psych: Normal mood and affect. Alert and oriented x3. Judgment and insight is normal.    Medical decision-making and discussion:  1. Chronic right-sided low back pain without sciatica  Radiculopathy?  Spinal stenosis?    Advised patient to use over-the-counter Salonpas as needed, heating pad as needed, Epsom salt baths. Advised patient to continue stretching and working on diet and exercise.  Advised patient to follow-up with orthopedic provider as indicated.    Follow-up for worsening symptoms,lack of expected recovery, or should new symptoms or problems arise.    2. Gastroesophageal reflux disease without esophagitis  This is a chronic problem. No red flags. Continue PPI and monitor.    Ways to help your acid reflux:    · Eat smaller portions.  · Avoid lying down after meals.  · Normalize body weight.  · Avoid common reflux triggers such as spicy, greasy foods, peppers, onions.  · Avoid caffeine, carbonation, alcohol, tobacco.  · Raise the head of your bed by placing bricks or phone books between the floor and the legs of the head board.   · Use OTC medicines such as TUMS, Rolaids, Maalox, Gaviscon, Zantac, Pepcid, Tagamet.         3. Obesity (BMI 30-39.9)  - Encouraged diet high in fruits, vegetables, and fiber. And a diet low in salt, refined carbohydrates, cholesterol, saturated fat, and trans fatty acids.    - Encouraged  a minimum of 30 minutes of moderate intensity aerobic exercise (eg, brisk walking) is recommended on five days each week. Or 20 minutes of vigorous-intensity aerobic exercise (eg, jogging) on three days each week.           Please note that this dictation was created using voice recognition software. I have made every reasonable attempt to correct obvious errors, but I expect that there are errors of grammar and possibly content that I did not discover before  finalizing the note.      Return if symptoms worsen or fail to improve.

## 2018-07-28 ENCOUNTER — OFFICE VISIT (OUTPATIENT)
Dept: URGENT CARE | Facility: CLINIC | Age: 73
End: 2018-07-28
Payer: MEDICARE

## 2018-07-28 VITALS
WEIGHT: 236 LBS | TEMPERATURE: 98.5 F | DIASTOLIC BLOOD PRESSURE: 70 MMHG | HEIGHT: 72 IN | SYSTOLIC BLOOD PRESSURE: 122 MMHG | OXYGEN SATURATION: 95 % | HEART RATE: 62 BPM | RESPIRATION RATE: 16 BRPM | BODY MASS INDEX: 31.97 KG/M2

## 2018-07-28 DIAGNOSIS — S05.01XA ABRASION OF RIGHT CORNEA, INITIAL ENCOUNTER: ICD-10-CM

## 2018-07-28 PROCEDURE — 99213 OFFICE O/P EST LOW 20 MIN: CPT | Performed by: PHYSICIAN ASSISTANT

## 2018-07-28 RX ORDER — NEOMYCIN SULFATE, POLYMYXIN B SULFATE, AND DEXAMETHASONE 3.5; 10000; 1 MG/G; [USP'U]/G; MG/G
1 OINTMENT OPHTHALMIC
Qty: 1 TUBE | Refills: 0 | Status: SHIPPED | OUTPATIENT
Start: 2018-07-28 | End: 2018-08-17

## 2018-07-28 RX ORDER — NEOMYCIN SULFATE, POLYMYXIN B SULFATE AND DEXAMETHASONE 3.5; 10000; 1 MG/ML; [USP'U]/ML; MG/ML
1 SUSPENSION/ DROPS OPHTHALMIC 4 TIMES DAILY
Qty: 1 BOTTLE | Refills: 0 | Status: SHIPPED | OUTPATIENT
Start: 2018-07-28 | End: 2018-08-17

## 2018-07-28 ASSESSMENT — ENCOUNTER SYMPTOMS
CONSTITUTIONAL NEGATIVE: 1
DOUBLE VISION: 0
EYE ITCHING: 0
EYE DISCHARGE: 1
EYE PAIN: 1
BLURRED VISION: 0
NAUSEA: 0
FEVER: 0
EYE REDNESS: 1
PHOTOPHOBIA: 0
NEUROLOGICAL NEGATIVE: 1
FOREIGN BODY SENSATION: 1

## 2018-07-28 NOTE — PROGRESS NOTES
"Subjective:      Valeriy Orlando is a 72 y.o. male who presents with Eye Problem ((R) eye redness, something poss stuck in eye )            Eye Problem    The right (?FB eye) eye is affected. This is a new problem. The current episode started today. The problem occurs constantly. The problem has been unchanged. There was no injury mechanism. The pain is mild. Associated symptoms include an eye discharge, eye redness and a foreign body sensation. Pertinent negatives include no blurred vision, double vision, fever, itching, nausea, photophobia or recent URI. He has tried nothing for the symptoms. The treatment provided no relief.       Review of Systems   Constitutional: Negative.  Negative for fever.   HENT: Negative.    Eyes: Positive for pain, discharge and redness. Negative for blurred vision, double vision, photophobia and itching.   Gastrointestinal: Negative for nausea.   Skin: Negative.    Neurological: Negative.           Objective:     /70   Pulse 62   Temp 36.9 °C (98.5 °F)   Resp 16   Ht 1.829 m (6' 0.01\")   Wt 107 kg (236 lb)   SpO2 95%   BMI 32.00 kg/m²      Physical Exam   Constitutional: He is oriented to person, place, and time. He appears well-developed and well-nourished. No distress.   Eyes: Pupils are equal, round, and reactive to light. EOM are normal.   OD; stain faintly +corneal abr across top cornea; no FB seen   Neck: Normal range of motion. Neck supple.   Neurological: He is alert and oriented to person, place, and time.   Skin: Skin is warm and dry. No rash noted. No erythema.   Psychiatric: He has a normal mood and affect. His behavior is normal.   Nursing note and vitals reviewed.    Active Ambulatory Problems     Diagnosis Date Noted   • Degeneration of lumbar intervertebral disc 11/29/2016   • Essential hypertension 05/03/2017   • Prediabetes 05/03/2017   • Gastroesophageal reflux disease without esophagitis 05/03/2017   • Obesity (BMI 30-39.9) 05/03/2017   • Abnormal " stress test 05/03/2017   • MAVERICK (obstructive sleep apnea) 05/03/2017   • Nonrheumatic aortic valve insufficiency 09/13/2017   • Elevated LDL cholesterol level 05/09/2018   • Chronic right-sided low back pain without sciatica 07/06/2018     Resolved Ambulatory Problems     Diagnosis Date Noted   • JIHAN (acute kidney injury) (HCC) 05/03/2017   • Abdominal bloating 06/06/2017   • Abnormal EKG 06/06/2017   • Gastroenteritis 06/23/2017   • Flank pain 09/13/2017   • Liver lesion 12/13/2017     Past Medical History:   Diagnosis Date   • Depression    • Heart burn    • Hepatitis A 2001   • Hypertension    • Prediabetes    • Sleep apnea    • Snoring      Current Outpatient Prescriptions on File Prior to Visit   Medication Sig Dispense Refill   • omeprazole (PRILOSEC) 20 MG delayed-release capsule TAKE 1 CAP BY MOUTH AS NEEDED (FOR HEARTBURN). 90 Cap 3   • lisinopril (PRINIVIL) 10 MG Tab TAKE 1 TAB BY MOUTH EVERY DAY. 90 Tab 3   • nitroglycerin (NITROSTAT) 0.4 MG SL Tab Place 1 Tab under tongue as needed for Chest Pain. 25 Tab 3   • aspirin 81 MG tablet Take 81 mg by mouth every day.       No current facility-administered medications on file prior to visit.      Social History     Social History   • Marital status: Single     Spouse name: N/A   • Number of children: N/A   • Years of education: N/A     Occupational History   • Not on file.     Social History Main Topics   • Smoking status: Never Smoker   • Smokeless tobacco: Never Used   • Alcohol use 0.6 oz/week     1 Glasses of wine per week      Comment: 1 glass wine per week    • Drug use: No      Comment: Uses CBD    • Sexual activity: Yes     Partners: Female     Other Topics Concern   • Not on file     Social History Narrative   • No narrative on file     Family History   Problem Relation Age of Onset   • Heart Disease Mother    • Cancer Mother         lung cancer   • Heart Disease Maternal Grandmother    • Diabetes Paternal Grandfather      Ibuprofen               Assessment/Plan:     ·  OD       ·

## 2018-08-17 ENCOUNTER — TELEPHONE (OUTPATIENT)
Dept: MEDICAL GROUP | Facility: PHYSICIAN GROUP | Age: 73
End: 2018-08-17

## 2018-08-17 ENCOUNTER — OFFICE VISIT (OUTPATIENT)
Dept: MEDICAL GROUP | Facility: PHYSICIAN GROUP | Age: 73
End: 2018-08-17
Payer: MEDICARE

## 2018-08-17 VITALS
SYSTOLIC BLOOD PRESSURE: 126 MMHG | RESPIRATION RATE: 14 BRPM | OXYGEN SATURATION: 95 % | WEIGHT: 239 LBS | HEART RATE: 77 BPM | TEMPERATURE: 97.8 F | HEIGHT: 72 IN | DIASTOLIC BLOOD PRESSURE: 74 MMHG | BODY MASS INDEX: 32.37 KG/M2

## 2018-08-17 DIAGNOSIS — Z12.5 PROSTATE CANCER SCREENING: ICD-10-CM

## 2018-08-17 DIAGNOSIS — I10 ESSENTIAL HYPERTENSION: ICD-10-CM

## 2018-08-17 DIAGNOSIS — M54.50 CHRONIC RIGHT-SIDED LOW BACK PAIN WITHOUT SCIATICA: ICD-10-CM

## 2018-08-17 DIAGNOSIS — Z23 NEED FOR VACCINATION: ICD-10-CM

## 2018-08-17 DIAGNOSIS — M51.36 DEGENERATION OF LUMBAR INTERVERTEBRAL DISC: ICD-10-CM

## 2018-08-17 DIAGNOSIS — R94.39 ABNORMAL STRESS TEST: ICD-10-CM

## 2018-08-17 DIAGNOSIS — G89.29 CHRONIC RIGHT-SIDED LOW BACK PAIN WITHOUT SCIATICA: ICD-10-CM

## 2018-08-17 DIAGNOSIS — G47.33 OSA (OBSTRUCTIVE SLEEP APNEA): ICD-10-CM

## 2018-08-17 DIAGNOSIS — I35.1 NONRHEUMATIC AORTIC VALVE INSUFFICIENCY: ICD-10-CM

## 2018-08-17 PROCEDURE — 99214 OFFICE O/P EST MOD 30 MIN: CPT | Performed by: INTERNAL MEDICINE

## 2018-08-17 PROCEDURE — 93000 ELECTROCARDIOGRAM COMPLETE: CPT | Performed by: INTERNAL MEDICINE

## 2018-08-17 NOTE — LETTER
August 17, 2018       Patient: Valeriy Orlando   YOB: 1945   Date of Visit: 8/17/2018         To Whom It May Concern:    It is my medical opinion that Valeriy Orlando is very low risk pt for surgery. Cardiac risk score is 0, meaning 0.4 % probability for cardiac event.     If you have any questions or concerns, please don't hesitate to call [phone]          Sincerely,          Kaci Ward M.D.  Electronically Signed

## 2018-08-17 NOTE — TELEPHONE ENCOUNTER
Phone Number Called: [phone] (home)     Message: Called pt left vm to call back regarding paperwork from [facility].     Left Message for patient to call back: yes

## 2018-08-17 NOTE — TELEPHONE ENCOUNTER
1. Caller Name: Valeriy Stringerer                      Call Back Number: [phone] (home)     2. Message: Pt called notified of appointment today. No questions at this time.     3. Patient approves office to leave a detailed voicemail/MyChart message: N\A

## 2018-08-17 NOTE — PROGRESS NOTES
Subjective:   Valeriy Orlando is a 72 y.o. male here today for surgical clearance for laminectomy     72-year-old male past medical history hypertension, King's esophagus, wasting, degenerative joint disease presented today for surgical clearance.  Patient is going for another laminectomy procedure. He is feeling great he has no complains, besides that back is causing him trouble. He cannot walk 20 mins without back pain. He denies chest pain, sob, leg swelling. See previous note. At some point he was complaining for sob on excursions, but no chest pain. He had EKG showed possible low infarct. Stress test was unremarkable. He was seen cardiology and he was cleared from them. He exeresis regularly. No chest pain. EKG was repeat again today and it is similar as before. He is up to date with lab work. Cardiac revised score 0.   Pt is asking for lab work for prostate cancer screening. Order in place. No risk of bleeding. Lung stable. Letter for clearance provided     Current medicines (including changes today)  Current Outpatient Prescriptions   Medication Sig Dispense Refill   • omeprazole (PRILOSEC) 20 MG delayed-release capsule TAKE 1 CAP BY MOUTH AS NEEDED (FOR HEARTBURN). 90 Cap 3   • lisinopril (PRINIVIL) 10 MG Tab TAKE 1 TAB BY MOUTH EVERY DAY. 90 Tab 3   • nitroglycerin (NITROSTAT) 0.4 MG SL Tab Place 1 Tab under tongue as needed for Chest Pain. 25 Tab 3   • aspirin 81 MG tablet Take 81 mg by mouth every day.       No current facility-administered medications for this visit.      He  has a past medical history of Depression; Heart burn; Hepatitis A (2001); Hypertension; Prediabetes; Sleep apnea; and Snoring.    Current Outpatient Prescriptions   Medication Sig Dispense Refill   • omeprazole (PRILOSEC) 20 MG delayed-release capsule TAKE 1 CAP BY MOUTH AS NEEDED (FOR HEARTBURN). 90 Cap 3   • lisinopril (PRINIVIL) 10 MG Tab TAKE 1 TAB BY MOUTH EVERY DAY. 90 Tab 3   • nitroglycerin (NITROSTAT) 0.4 MG SL Tab  "Place 1 Tab under tongue as needed for Chest Pain. 25 Tab 3   • aspirin 81 MG tablet Take 81 mg by mouth every day.       No current facility-administered medications for this visit.        Allergies as of 08/17/2018 - Reviewed 08/17/2018   Allergen Reaction Noted   • Ibuprofen  05/17/2018       Social History     Social History   • Marital status: Single     Spouse name: N/A   • Number of children: N/A   • Years of education: N/A     Occupational History   • Not on file.     Social History Main Topics   • Smoking status: Never Smoker   • Smokeless tobacco: Never Used   • Alcohol use 0.6 oz/week     1 Glasses of wine per week      Comment: 1 glass wine per week    • Drug use: No      Comment: Uses CBD    • Sexual activity: Yes     Partners: Female     Other Topics Concern   • Not on file     Social History Narrative   • No narrative on file        Family History   Problem Relation Age of Onset   • Heart Disease Mother    • Cancer Mother         lung cancer   • Heart Disease Maternal Grandmother    • Diabetes Paternal Grandfather        Past Surgical History:   Procedure Laterality Date   • LUMBAR LAMINECTOMY DISKECTOMY  11/29/2016    Procedure: LUMBAR LAMINECTOMY DISKECTOMY L3-5 open laminectomy ;  Surgeon: Bentley Claudio M.D.;  Location: SURGERY [facility];  Service:    • OTHER ORTHOPEDIC SURGERY  1990    right knee scope   • GROIN EXPLORATION      growth   • KNEE ARTHROSCOPY         ROS   All systems reviewed are negative except for HPI       Objective:     Blood pressure 126/74, pulse 77, temperature 36.6 °C (97.8 °F), resp. rate 14, height 1.831 m (6' 0.1\"), weight 108.4 kg (239 lb), SpO2 95 %. Body mass index is 32.32 kg/m².   Physical Exam:  Constitutional: Alert, no distress.  Skin: Warm, dry, good turgor, no rashes in visible areas.  Eye: Equal, round and reactive, conjunctiva clear, lids normal.  ENMT: Lips without lesions, good dentition, oropharynx clear.  Neck: Trachea midline, no masses, no " thyromegaly. No cervical or supraclavicular lymphadenopathy  Respiratory: Unlabored respiratory effort, lungs clear to auscultation, no wheezes, no ronchi.  Cardiovascular: Normal S1, S2, no murmur, slight swelling of the left leg  Abdomen: Soft, non-tender, no masses, no hepatosplenomegaly.  Psych: Alert and oriented x3, normal affect and mood.        Assessment and Plan:   The following treatment plan was discussed    1. Need for vaccination  Pneumonia immunization another time     2. Degeneration of lumbar intervertebral disc  Clearance provided. Follow up with specialist   EKG: Sinus rhythm, heart rate 72, WI < 200 MS, QRS less than 120 MS questionable Q waves on inferior leads III, aVF,  no ST changes    3. Essential hypertension  Well controled. Continue to monitor     4. Nonrheumatic aortic valve insufficiency  Mild. Asymptomatic     5. MAVERICK (obstructive sleep apnea)  Continue cpap machine     6. Abnormal stress test  False positive, cleared from surgery     7. Prostate cancer screening  continue to monitor . No BPH symptoms   - PROSTATE SPECIFIC AG SCREENING; Future      Followup: Return in about 6 months (around 2/17/2019), or if symptoms worsen or fail to improve, for Short.

## 2018-08-29 ENCOUNTER — HOSPITAL ENCOUNTER (OUTPATIENT)
Facility: MEDICAL CENTER | Age: 73
End: 2018-08-29
Attending: INTERNAL MEDICINE
Payer: MEDICARE

## 2018-08-29 ENCOUNTER — HOSPITAL ENCOUNTER (OUTPATIENT)
Dept: RADIOLOGY | Facility: MEDICAL CENTER | Age: 73
End: 2018-08-29
Attending: NEUROLOGICAL SURGERY
Payer: MEDICARE

## 2018-08-29 DIAGNOSIS — Z01.810 PRE-OPERATIVE CARDIOVASCULAR EXAMINATION: ICD-10-CM

## 2018-08-29 DIAGNOSIS — Z12.5 PROSTATE CANCER SCREENING: ICD-10-CM

## 2018-08-29 DIAGNOSIS — Z01.811 PRE-OPERATIVE RESPIRATORY EXAMINATION: ICD-10-CM

## 2018-08-29 DIAGNOSIS — Z01.812 PRE-OPERATIVE LABORATORY EXAMINATION: ICD-10-CM

## 2018-08-29 LAB
ANION GAP SERPL CALC-SCNC: 10 MMOL/L (ref 0–11.9)
APPEARANCE UR: CLEAR
APTT PPP: 26.9 SEC (ref 24.7–36)
BASOPHILS # BLD AUTO: 0.8 % (ref 0–1.8)
BASOPHILS # BLD: 0.05 K/UL (ref 0–0.12)
BILIRUB UR QL STRIP.AUTO: NEGATIVE
BUN SERPL-MCNC: 28 MG/DL (ref 8–22)
CALCIUM SERPL-MCNC: 9.5 MG/DL (ref 8.5–10.5)
CHLORIDE SERPL-SCNC: 107 MMOL/L (ref 96–112)
CO2 SERPL-SCNC: 25 MMOL/L (ref 20–33)
COLOR UR: YELLOW
CREAT SERPL-MCNC: 1.31 MG/DL (ref 0.5–1.4)
EOSINOPHIL # BLD AUTO: 0.23 K/UL (ref 0–0.51)
EOSINOPHIL NFR BLD: 3.8 % (ref 0–6.9)
ERYTHROCYTE [DISTWIDTH] IN BLOOD BY AUTOMATED COUNT: 51.8 FL (ref 35.9–50)
GLUCOSE SERPL-MCNC: 113 MG/DL (ref 65–99)
GLUCOSE UR STRIP.AUTO-MCNC: NEGATIVE MG/DL
HCT VFR BLD AUTO: 45 % (ref 42–52)
HGB BLD-MCNC: 15.3 G/DL (ref 14–18)
IMM GRANULOCYTES # BLD AUTO: 0.01 K/UL (ref 0–0.11)
IMM GRANULOCYTES NFR BLD AUTO: 0.2 % (ref 0–0.9)
INR PPP: 0.98 (ref 0.87–1.13)
KETONES UR STRIP.AUTO-MCNC: NEGATIVE MG/DL
LEUKOCYTE ESTERASE UR QL STRIP.AUTO: NEGATIVE
LYMPHOCYTES # BLD AUTO: 1.19 K/UL (ref 1–4.8)
LYMPHOCYTES NFR BLD: 19.8 % (ref 22–41)
MCH RBC QN AUTO: 33.3 PG (ref 27–33)
MCHC RBC AUTO-ENTMCNC: 34 G/DL (ref 33.7–35.3)
MCV RBC AUTO: 98 FL (ref 81.4–97.8)
MICRO URNS: NORMAL
MONOCYTES # BLD AUTO: 0.33 K/UL (ref 0–0.85)
MONOCYTES NFR BLD AUTO: 5.5 % (ref 0–13.4)
NEUTROPHILS # BLD AUTO: 4.2 K/UL (ref 1.82–7.42)
NEUTROPHILS NFR BLD: 69.9 % (ref 44–72)
NITRITE UR QL STRIP.AUTO: NEGATIVE
NRBC # BLD AUTO: 0 K/UL
NRBC BLD-RTO: 0 /100 WBC
PH UR STRIP.AUTO: 6.5 [PH]
PLATELET # BLD AUTO: 216 K/UL (ref 164–446)
PMV BLD AUTO: 9.2 FL (ref 9–12.9)
POTASSIUM SERPL-SCNC: 4.3 MMOL/L (ref 3.6–5.5)
PROT UR QL STRIP: NEGATIVE MG/DL
PROTHROMBIN TIME: 12.7 SEC (ref 12–14.6)
PSA SERPL-MCNC: 0.96 NG/ML (ref 0–4)
RBC # BLD AUTO: 4.59 M/UL (ref 4.7–6.1)
RBC UR QL AUTO: NEGATIVE
SODIUM SERPL-SCNC: 142 MMOL/L (ref 135–145)
SP GR UR STRIP.AUTO: 1.02
UROBILINOGEN UR STRIP.AUTO-MCNC: 1 MG/DL
WBC # BLD AUTO: 6 K/UL (ref 4.8–10.8)

## 2018-08-29 PROCEDURE — 71045 X-RAY EXAM CHEST 1 VIEW: CPT

## 2018-08-29 PROCEDURE — 85610 PROTHROMBIN TIME: CPT

## 2018-08-29 PROCEDURE — 85730 THROMBOPLASTIN TIME PARTIAL: CPT

## 2018-08-29 PROCEDURE — 84153 ASSAY OF PSA TOTAL: CPT

## 2018-08-29 PROCEDURE — 36415 COLL VENOUS BLD VENIPUNCTURE: CPT

## 2018-08-29 PROCEDURE — 81003 URINALYSIS AUTO W/O SCOPE: CPT

## 2018-08-29 PROCEDURE — 80048 BASIC METABOLIC PNL TOTAL CA: CPT

## 2018-08-29 PROCEDURE — 85025 COMPLETE CBC W/AUTO DIFF WBC: CPT

## 2018-09-04 ENCOUNTER — APPOINTMENT (OUTPATIENT)
Dept: RADIOLOGY | Facility: MEDICAL CENTER | Age: 73
End: 2018-09-04
Attending: NEUROLOGICAL SURGERY
Payer: MEDICARE

## 2018-09-04 ENCOUNTER — HOSPITAL ENCOUNTER (OUTPATIENT)
Facility: MEDICAL CENTER | Age: 73
End: 2018-09-07
Attending: NEUROLOGICAL SURGERY | Admitting: NEUROLOGICAL SURGERY
Payer: MEDICARE

## 2018-09-04 DIAGNOSIS — M51.36 DEGENERATION OF LUMBAR INTERVERTEBRAL DISC: ICD-10-CM

## 2018-09-04 DIAGNOSIS — M54.50 ACUTE BILATERAL LOW BACK PAIN WITHOUT SCIATICA: ICD-10-CM

## 2018-09-04 PROCEDURE — 500885 HCHG PACK, JACKSON TABLE: Performed by: NEUROLOGICAL SURGERY

## 2018-09-04 PROCEDURE — 160036 HCHG PACU - EA ADDL 30 MINS PHASE I: Performed by: NEUROLOGICAL SURGERY

## 2018-09-04 PROCEDURE — 700101 HCHG RX REV CODE 250

## 2018-09-04 PROCEDURE — 700111 HCHG RX REV CODE 636 W/ 250 OVERRIDE (IP): Performed by: PHYSICIAN ASSISTANT

## 2018-09-04 PROCEDURE — 700102 HCHG RX REV CODE 250 W/ 637 OVERRIDE(OP): Performed by: PHYSICIAN ASSISTANT

## 2018-09-04 PROCEDURE — 160009 HCHG ANES TIME/MIN: Performed by: NEUROLOGICAL SURGERY

## 2018-09-04 PROCEDURE — 96365 THER/PROPH/DIAG IV INF INIT: CPT

## 2018-09-04 PROCEDURE — 160048 HCHG OR STATISTICAL LEVEL 1-5: Performed by: NEUROLOGICAL SURGERY

## 2018-09-04 PROCEDURE — 700111 HCHG RX REV CODE 636 W/ 250 OVERRIDE (IP)

## 2018-09-04 PROCEDURE — 700101 HCHG RX REV CODE 250: Performed by: PHYSICIAN ASSISTANT

## 2018-09-04 PROCEDURE — 160041 HCHG SURGERY MINUTES - EA ADDL 1 MIN LEVEL 4: Performed by: NEUROLOGICAL SURGERY

## 2018-09-04 PROCEDURE — 700112 HCHG RX REV CODE 229: Performed by: PHYSICIAN ASSISTANT

## 2018-09-04 PROCEDURE — 160029 HCHG SURGERY MINUTES - 1ST 30 MINS LEVEL 4: Performed by: NEUROLOGICAL SURGERY

## 2018-09-04 PROCEDURE — 72020 X-RAY EXAM OF SPINE 1 VIEW: CPT

## 2018-09-04 PROCEDURE — G0378 HOSPITAL OBSERVATION PER HR: HCPCS

## 2018-09-04 PROCEDURE — A9270 NON-COVERED ITEM OR SERVICE: HCPCS | Performed by: PHYSICIAN ASSISTANT

## 2018-09-04 PROCEDURE — 110454 HCHG SHELL REV 250: Performed by: NEUROLOGICAL SURGERY

## 2018-09-04 PROCEDURE — 160035 HCHG PACU - 1ST 60 MINS PHASE I: Performed by: NEUROLOGICAL SURGERY

## 2018-09-04 PROCEDURE — 160002 HCHG RECOVERY MINUTES (STAT): Performed by: NEUROLOGICAL SURGERY

## 2018-09-04 PROCEDURE — 501838 HCHG SUTURE GENERAL: Performed by: NEUROLOGICAL SURGERY

## 2018-09-04 RX ORDER — AMOXICILLIN 250 MG
1 CAPSULE ORAL NIGHTLY
Status: DISCONTINUED | OUTPATIENT
Start: 2018-09-04 | End: 2018-09-07 | Stop reason: HOSPADM

## 2018-09-04 RX ORDER — OXYCODONE HYDROCHLORIDE 10 MG/1
10 TABLET ORAL
Status: DISCONTINUED | OUTPATIENT
Start: 2018-09-04 | End: 2018-09-07 | Stop reason: HOSPADM

## 2018-09-04 RX ORDER — LIDOCAINE HYDROCHLORIDE 10 MG/ML
0.5 INJECTION, SOLUTION INFILTRATION; PERINEURAL
Status: ACTIVE | OUTPATIENT
Start: 2018-09-04 | End: 2018-09-05

## 2018-09-04 RX ORDER — METHOCARBAMOL 750 MG/1
750 TABLET, FILM COATED ORAL EVERY 8 HOURS PRN
Status: DISCONTINUED | OUTPATIENT
Start: 2018-09-04 | End: 2018-09-07 | Stop reason: HOSPADM

## 2018-09-04 RX ORDER — SODIUM CHLORIDE, SODIUM LACTATE, POTASSIUM CHLORIDE, CALCIUM CHLORIDE 600; 310; 30; 20 MG/100ML; MG/100ML; MG/100ML; MG/100ML
INJECTION, SOLUTION INTRAVENOUS CONTINUOUS
Status: DISCONTINUED | OUTPATIENT
Start: 2018-09-04 | End: 2018-09-07 | Stop reason: HOSPADM

## 2018-09-04 RX ORDER — DIPHENHYDRAMINE HCL 25 MG
25 TABLET ORAL EVERY 6 HOURS PRN
Status: DISCONTINUED | OUTPATIENT
Start: 2018-09-04 | End: 2018-09-07 | Stop reason: HOSPADM

## 2018-09-04 RX ORDER — DEXTROSE MONOHYDRATE, SODIUM CHLORIDE, AND POTASSIUM CHLORIDE 50; 1.49; 4.5 G/1000ML; G/1000ML; G/1000ML
INJECTION, SOLUTION INTRAVENOUS CONTINUOUS
Status: DISCONTINUED | OUTPATIENT
Start: 2018-09-04 | End: 2018-09-07 | Stop reason: HOSPADM

## 2018-09-04 RX ORDER — DOCUSATE SODIUM 100 MG/1
100 CAPSULE, LIQUID FILLED ORAL 2 TIMES DAILY
Status: DISCONTINUED | OUTPATIENT
Start: 2018-09-04 | End: 2018-09-07 | Stop reason: HOSPADM

## 2018-09-04 RX ORDER — ACETAMINOPHEN 325 MG/1
650 TABLET ORAL EVERY 6 HOURS
Status: DISCONTINUED | OUTPATIENT
Start: 2018-09-04 | End: 2018-09-07 | Stop reason: HOSPADM

## 2018-09-04 RX ORDER — BUPIVACAINE HYDROCHLORIDE AND EPINEPHRINE 5; 5 MG/ML; UG/ML
INJECTION, SOLUTION EPIDURAL; INTRACAUDAL; PERINEURAL
Status: DISCONTINUED | OUTPATIENT
Start: 2018-09-04 | End: 2018-09-04 | Stop reason: HOSPADM

## 2018-09-04 RX ORDER — AMOXICILLIN 250 MG
1 CAPSULE ORAL
Status: DISCONTINUED | OUTPATIENT
Start: 2018-09-04 | End: 2018-09-07 | Stop reason: HOSPADM

## 2018-09-04 RX ORDER — HYDRALAZINE HYDROCHLORIDE 20 MG/ML
10 INJECTION INTRAMUSCULAR; INTRAVENOUS
Status: DISCONTINUED | OUTPATIENT
Start: 2018-09-04 | End: 2018-09-07 | Stop reason: HOSPADM

## 2018-09-04 RX ORDER — SODIUM CHLORIDE, SODIUM LACTATE, POTASSIUM CHLORIDE, AND CALCIUM CHLORIDE .6; .31; .03; .02 G/100ML; G/100ML; G/100ML; G/100ML
IRRIGANT IRRIGATION
Status: DISCONTINUED | OUTPATIENT
Start: 2018-09-04 | End: 2018-09-04 | Stop reason: HOSPADM

## 2018-09-04 RX ORDER — POLYETHYLENE GLYCOL 3350 17 G/17G
1 POWDER, FOR SOLUTION ORAL 2 TIMES DAILY PRN
Status: DISCONTINUED | OUTPATIENT
Start: 2018-09-04 | End: 2018-09-06

## 2018-09-04 RX ORDER — CEFAZOLIN SODIUM 2 G/100ML
2 INJECTION, SOLUTION INTRAVENOUS EVERY 8 HOURS
Status: COMPLETED | OUTPATIENT
Start: 2018-09-04 | End: 2018-09-05

## 2018-09-04 RX ORDER — BISACODYL 10 MG
10 SUPPOSITORY, RECTAL RECTAL
Status: DISCONTINUED | OUTPATIENT
Start: 2018-09-04 | End: 2018-09-07 | Stop reason: HOSPADM

## 2018-09-04 RX ORDER — ACETAMINOPHEN 325 MG/1
650 TABLET ORAL EVERY 6 HOURS
Status: DISCONTINUED | OUTPATIENT
Start: 2018-09-04 | End: 2018-09-04

## 2018-09-04 RX ORDER — DIPHENHYDRAMINE HYDROCHLORIDE 50 MG/ML
25 INJECTION INTRAMUSCULAR; INTRAVENOUS EVERY 6 HOURS PRN
Status: DISCONTINUED | OUTPATIENT
Start: 2018-09-04 | End: 2018-09-07 | Stop reason: HOSPADM

## 2018-09-04 RX ORDER — CALCIUM CARBONATE 500 MG/1
500 TABLET, CHEWABLE ORAL 2 TIMES DAILY
Status: DISCONTINUED | OUTPATIENT
Start: 2018-09-04 | End: 2018-09-07 | Stop reason: HOSPADM

## 2018-09-04 RX ORDER — HYDROMORPHONE HYDROCHLORIDE 2 MG/ML
.5-1 INJECTION, SOLUTION INTRAMUSCULAR; INTRAVENOUS; SUBCUTANEOUS
Status: DISCONTINUED | OUTPATIENT
Start: 2018-09-04 | End: 2018-09-07 | Stop reason: HOSPADM

## 2018-09-04 RX ORDER — LISINOPRIL 10 MG/1
10 TABLET ORAL DAILY
Status: DISCONTINUED | OUTPATIENT
Start: 2018-09-04 | End: 2018-09-04

## 2018-09-04 RX ORDER — ENEMA 19; 7 G/133ML; G/133ML
1 ENEMA RECTAL
Status: DISCONTINUED | OUTPATIENT
Start: 2018-09-04 | End: 2018-09-07 | Stop reason: HOSPADM

## 2018-09-04 RX ORDER — LABETALOL HYDROCHLORIDE 5 MG/ML
10 INJECTION, SOLUTION INTRAVENOUS
Status: DISCONTINUED | OUTPATIENT
Start: 2018-09-04 | End: 2018-09-07 | Stop reason: HOSPADM

## 2018-09-04 RX ORDER — ONDANSETRON 4 MG/1
4 TABLET, ORALLY DISINTEGRATING ORAL EVERY 4 HOURS PRN
Status: DISCONTINUED | OUTPATIENT
Start: 2018-09-04 | End: 2018-09-07 | Stop reason: HOSPADM

## 2018-09-04 RX ORDER — CLONIDINE HYDROCHLORIDE 0.1 MG/1
0.1 TABLET ORAL EVERY 4 HOURS PRN
Status: DISCONTINUED | OUTPATIENT
Start: 2018-09-04 | End: 2018-09-07 | Stop reason: HOSPADM

## 2018-09-04 RX ORDER — SULFAMETHOXAZOLE AND TRIMETHOPRIM 800; 160 MG/1; MG/1
1 TABLET ORAL EVERY 12 HOURS
Status: DISCONTINUED | OUTPATIENT
Start: 2018-09-05 | End: 2018-09-07 | Stop reason: HOSPADM

## 2018-09-04 RX ORDER — ALPRAZOLAM 0.25 MG/1
0.25 TABLET ORAL 2 TIMES DAILY PRN
Status: DISCONTINUED | OUTPATIENT
Start: 2018-09-04 | End: 2018-09-07 | Stop reason: HOSPADM

## 2018-09-04 RX ORDER — LISINOPRIL 10 MG/1
10 TABLET ORAL DAILY
Status: DISCONTINUED | OUTPATIENT
Start: 2018-09-05 | End: 2018-09-07 | Stop reason: HOSPADM

## 2018-09-04 RX ORDER — LIDOCAINE HYDROCHLORIDE 10 MG/ML
INJECTION, SOLUTION INFILTRATION; PERINEURAL
Status: COMPLETED
Start: 2018-09-04 | End: 2018-09-04

## 2018-09-04 RX ORDER — OXYCODONE HYDROCHLORIDE 5 MG/1
5 TABLET ORAL
Status: DISCONTINUED | OUTPATIENT
Start: 2018-09-04 | End: 2018-09-07 | Stop reason: HOSPADM

## 2018-09-04 RX ORDER — ONDANSETRON 2 MG/ML
4 INJECTION INTRAMUSCULAR; INTRAVENOUS EVERY 4 HOURS PRN
Status: DISCONTINUED | OUTPATIENT
Start: 2018-09-04 | End: 2018-09-07 | Stop reason: HOSPADM

## 2018-09-04 RX ADMIN — POTASSIUM CHLORIDE, DEXTROSE MONOHYDRATE AND SODIUM CHLORIDE: 150; 5; 450 INJECTION, SOLUTION INTRAVENOUS at 15:48

## 2018-09-04 RX ADMIN — METHOCARBAMOL 750 MG: 750 TABLET, FILM COATED ORAL at 16:34

## 2018-09-04 RX ADMIN — LIDOCAINE HYDROCHLORIDE 0.5 ML: 10 INJECTION, SOLUTION INFILTRATION; PERINEURAL at 10:42

## 2018-09-04 RX ADMIN — ANTACID TABLETS 500 MG: 500 TABLET, CHEWABLE ORAL at 16:34

## 2018-09-04 RX ADMIN — OXYCODONE HYDROCHLORIDE 10 MG: 10 TABLET ORAL at 23:45

## 2018-09-04 RX ADMIN — DOCUSATE SODIUM 100 MG: 100 CAPSULE, LIQUID FILLED ORAL at 16:34

## 2018-09-04 RX ADMIN — SENNOSIDES AND DOCUSATE SODIUM 1 TABLET: 8.6; 5 TABLET ORAL at 20:29

## 2018-09-04 RX ADMIN — CEFAZOLIN SODIUM 2 G: 2 INJECTION, SOLUTION INTRAVENOUS at 20:29

## 2018-09-04 RX ADMIN — SODIUM CHLORIDE, SODIUM LACTATE, POTASSIUM CHLORIDE, CALCIUM CHLORIDE: 600; 310; 30; 20 INJECTION, SOLUTION INTRAVENOUS at 10:42

## 2018-09-04 RX ADMIN — OXYCODONE HYDROCHLORIDE 5 MG: 5 TABLET ORAL at 20:39

## 2018-09-04 RX ADMIN — ACETAMINOPHEN 650 MG: 325 TABLET, FILM COATED ORAL at 16:34

## 2018-09-04 RX ADMIN — ACETAMINOPHEN 650 MG: 325 TABLET, FILM COATED ORAL at 23:44

## 2018-09-04 ASSESSMENT — PAIN SCALES - GENERAL
PAINLEVEL_OUTOF10: 0
PAINLEVEL_OUTOF10: 3
PAINLEVEL_OUTOF10: 0
PAINLEVEL_OUTOF10: 7
PAINLEVEL_OUTOF10: 0
PAINLEVEL_OUTOF10: 0
PAINLEVEL_OUTOF10: 1
PAINLEVEL_OUTOF10: 7
PAINLEVEL_OUTOF10: 0

## 2018-09-04 ASSESSMENT — COGNITIVE AND FUNCTIONAL STATUS - GENERAL
MOVING FROM LYING ON BACK TO SITTING ON SIDE OF FLAT BED: A LITTLE
DRESSING REGULAR LOWER BODY CLOTHING: A LITTLE
DAILY ACTIVITIY SCORE: 21
SUGGESTED CMS G CODE MODIFIER DAILY ACTIVITY: CJ
CLIMB 3 TO 5 STEPS WITH RAILING: A LITTLE
MOVING TO AND FROM BED TO CHAIR: A LITTLE
HELP NEEDED FOR BATHING: A LITTLE
MOBILITY SCORE: 19
TOILETING: A LITTLE
STANDING UP FROM CHAIR USING ARMS: A LITTLE
WALKING IN HOSPITAL ROOM: A LITTLE
SUGGESTED CMS G CODE MODIFIER MOBILITY: CK

## 2018-09-04 ASSESSMENT — PATIENT HEALTH QUESTIONNAIRE - PHQ9
SUM OF ALL RESPONSES TO PHQ9 QUESTIONS 1 AND 2: 0
1. LITTLE INTEREST OR PLEASURE IN DOING THINGS: NOT AT ALL
2. FEELING DOWN, DEPRESSED, IRRITABLE, OR HOPELESS: NOT AT ALL

## 2018-09-04 ASSESSMENT — LIFESTYLE VARIABLES
ON A TYPICAL DAY WHEN YOU DRINK ALCOHOL HOW MANY DRINKS DO YOU HAVE: 2
HAVE YOU EVER FELT YOU SHOULD CUT DOWN ON YOUR DRINKING: NO
CONSUMPTION TOTAL: NEGATIVE
EVER HAD A DRINK FIRST THING IN THE MORNING TO STEADY YOUR NERVES TO GET RID OF A HANGOVER: NO
TOTAL SCORE: 0
ALCOHOL_USE: YES
AVERAGE NUMBER OF DAYS PER WEEK YOU HAVE A DRINK CONTAINING ALCOHOL: 1
TOTAL SCORE: 0
HAVE PEOPLE ANNOYED YOU BY CRITICIZING YOUR DRINKING: NO
HOW MANY TIMES IN THE PAST YEAR HAVE YOU HAD 5 OR MORE DRINKS IN A DAY: 0
EVER FELT BAD OR GUILTY ABOUT YOUR DRINKING: NO
TOTAL SCORE: 0

## 2018-09-04 NOTE — PROGRESS NOTES
2 RN skin check complete, no evidence of skin breakdown noted, surgical incision midline lower back.

## 2018-09-04 NOTE — OP REPORT
DATE OF SERVICE:  09/04/2018    PREOPERATIVE DIAGNOSES:  1.  Bilateral L3-L4 radiculopathies.  2.  L2-L4 lumbar stenosis, bilateral recess stenosis.  3.  Stable L3-L4 and L4-L5 anterolisthesis.  4.  Status post prior laminectomies.    POSTOPERATIVE DIAGNOSES:  1.  Bilateral L3-L4 radiculopathies.  2.  L2-L4 lumbar stenosis, bilateral recess stenosis.  3.  Stable L3-L4 and L4-L5 anterolisthesis.  4.  Status post prior laminectomies.    PROCEDURES:  1.  Redo bilateral L3 laminotomies, foraminotomies, decompression, bilateral   L3 nerve roots.  2.  Redo bilateral L4 laminotomies, foraminotomies, decompression, bilateral   L4 nerve roots.  3.  L2 decompressive lumbar laminectomy with bilateral foraminotomies, CPT   code 52535.  4.  Left L3 transpedicular approach with far lateral decompression, left L3   nerve root.  5.  Left L4 transpedicular approach with far lateral decompression, left L4   nerve root.  6.  Microscope for microdissection of spinal canal.    SURGEON:  Bentley Claudio MD, neurosurgery-spine surgery    ASSISTANT:  Sarah Thompson PA-C    ANESTHESIA:  General endotracheal anesthesia.    ANESTHESIOLOGIST:  Elio Disla MD    COMPLICATIONS:  None.    ESTIMATED BLOOD LOSS:  Less than 50 mL    PREOPERATIVE NOTE:  This is an extremely pleasant 72-year-old male who   presents with low back pain and bilateral neurogenic claudication without   radicular leg pain.  Clinical exam was consistent with bilateral L3-L4   radiculopathies with degree of weakness and sensory loss.  The patient had   great difficulty walking.  Given his MRI findings of L2-L3 severe lumbar   stenosis and L3-L4 lateral recess stenosis, I offered the patient the   above-listed procedures for redo bilateral decompression as described.  I   discussed surgical procedure, alternatives, goals, risks, benefits,   complications in detail with the patient.  I used a bone model, MRI and   educational handouts to assist the patient with  his decision making.  I   answered all questions to the best of my ability.  The patient understood and   consented to surgery.    NARRATIVE DICTATION:  The patient was brought to the operating room and placed   under general endotracheal anesthesia.  He was placed prone on the operating   OSI table with care taken about the bony prominences and peripheral nerves.    Lumbar region was prepped and draped in usual sterile fashion.  Following   localization with cross table fluoroscopy, a small incision was made from   L2-L4 and the dorsal elements of L2-L4 were exposed in a subperiosteal fashion   out to the facets bilaterally.  Self-retaining retraction was applied.    Intraoperative x-ray confirmed appropriate levels.  Once excellent exposure   was achieved using the long Gelpi retractors, I was able to proceed with the   laminotomies.    Using the Yousufas Bobby AM-8 drillbit, Kerrison rongeurs and curettes, bilateral   redo L3 laminotomies and foraminotomies completed, bilateral redo L4   laminotomies and foraminotomies completed, the decompressive lumbar   laminectomy of L2 was completed.  The marked facet and ligamentous hypertrophy   was undercut and removed bilaterally.  There was still more stenosis on the   left side, hence, I drilled down the left L3 pedicle and the left L4 pedicle   for transpedicular decompression of left L3-L4 nerve root complexes.  This   required extra degree of expertise, effort and time, and hence, a modifier-59   is required for separate and distinct procedures for CPT code 23564-29251.    The edges of the bone were bone waxed.  Thrombin Gelfoam placed in epidural   gutters for hemostasis.  Garner ball hook was easily placed over the exiting   nerve roots.    After the extensive decompression was achieved, I irrigated out the wound   copiously.  I then placed an epidural catheter with Marcaine and fentanyl for   postoperative analgesia.  I infiltrated the muscle with long-acting Marcaine    analgesia, closed the wound over a drain brought out through a separate   incision with 0 Vicryl to deep fascia, 2-0 Vicryl to deep dermal layer,   Steri-Strips to skin.  Small sterile dressing was applied.    INTRAOPERATIVE FINDINGS:  Severe stenosis L2-L3 and L3-L4 level, which was   freed up as described.  No complications were encountered.  The patient was   stable in recovery room.  The patient will follow up at [facility] [place] in 2 weeks and 6 weeks' time as arranged.       ____________________________________     PAWEL ORTEGA MD    JJL / NTS    DD:  09/04/2018 13:37:10  DT:  09/04/2018 13:56:33    D#:  [id number]  Job#:  [id number]    cc: Kaci Ward MD

## 2018-09-04 NOTE — PROGRESS NOTES
The Medication Reconciliation process has been completed by interviewing the patient    Allergies have been reviewed  Antibiotic use in 30 days - none    Home Pharmacy: [facility] [place]

## 2018-09-04 NOTE — OR SURGEON
Immediate Post OP Note    PreOp Diagnosis: Lumbar stenosis, Neurogenic Claudication     PostOp Diagnosis: Same    Procedure(s):  LUMBAR LAMINECTOMY DISKECTOMY-  L2-3 OPEN LAMI AND REDO L3-4 LAMI - Wound Class: Clean with Drain    Surgeon(s):  Bentley Claudio M.D.    Anesthesiologist/Type of Anesthesia:  Anesthesiologist: Elio Disla M.D./General    Surgical Staff:  Assistant: Sarah Thompson P.A.-C.  Circulator: Radha Yadav R.N.  Relief Circulator: Marj Gorman R.N.  Relief Scrub: Zackery Tee  Scrub Person: Jesus Lares  Radiology Technologist: Jenny Menchaca    Specimens removed if any:  * No specimens in log *    Estimated Blood Loss: 50ccs    Findings: See op report    Complications: None        9/4/2018 1:36 PM Sarah Thompson P.A.-C.

## 2018-09-04 NOTE — OR NURSING
Pt dc'd from [facility], a&ox4. Equal strengths in all extremities, no c/o pain, numbness or tingling, midline low back incision clean dry intact, hemovac intact, scant bloody drainage

## 2018-09-05 LAB
ANION GAP SERPL CALC-SCNC: 5 MMOL/L (ref 0–11.9)
BUN SERPL-MCNC: 20 MG/DL (ref 8–22)
CALCIUM SERPL-MCNC: 9.4 MG/DL (ref 8.5–10.5)
CHLORIDE SERPL-SCNC: 107 MMOL/L (ref 96–112)
CO2 SERPL-SCNC: 25 MMOL/L (ref 20–33)
CREAT SERPL-MCNC: 1.27 MG/DL (ref 0.5–1.4)
ERYTHROCYTE [DISTWIDTH] IN BLOOD BY AUTOMATED COUNT: 51.6 FL (ref 35.9–50)
GLUCOSE SERPL-MCNC: 162 MG/DL (ref 65–99)
HCT VFR BLD AUTO: 40.4 % (ref 42–52)
HGB BLD-MCNC: 13.3 G/DL (ref 14–18)
MCH RBC QN AUTO: 32.1 PG (ref 27–33)
MCHC RBC AUTO-ENTMCNC: 32.9 G/DL (ref 33.7–35.3)
MCV RBC AUTO: 97.6 FL (ref 81.4–97.8)
PLATELET # BLD AUTO: 201 K/UL (ref 164–446)
PMV BLD AUTO: 9.2 FL (ref 9–12.9)
POTASSIUM SERPL-SCNC: 4.5 MMOL/L (ref 3.6–5.5)
RBC # BLD AUTO: 4.14 M/UL (ref 4.7–6.1)
SODIUM SERPL-SCNC: 137 MMOL/L (ref 135–145)
WBC # BLD AUTO: 9 K/UL (ref 4.8–10.8)

## 2018-09-05 PROCEDURE — G8989 SELF CARE D/C STATUS: HCPCS | Mod: CI

## 2018-09-05 PROCEDURE — 700111 HCHG RX REV CODE 636 W/ 250 OVERRIDE (IP): Performed by: PHYSICIAN ASSISTANT

## 2018-09-05 PROCEDURE — G8988 SELF CARE GOAL STATUS: HCPCS | Mod: CI

## 2018-09-05 PROCEDURE — 97165 OT EVAL LOW COMPLEX 30 MIN: CPT

## 2018-09-05 PROCEDURE — A9270 NON-COVERED ITEM OR SERVICE: HCPCS | Performed by: PHYSICIAN ASSISTANT

## 2018-09-05 PROCEDURE — 97161 PT EVAL LOW COMPLEX 20 MIN: CPT

## 2018-09-05 PROCEDURE — 80048 BASIC METABOLIC PNL TOTAL CA: CPT

## 2018-09-05 PROCEDURE — 36415 COLL VENOUS BLD VENIPUNCTURE: CPT

## 2018-09-05 PROCEDURE — G8979 MOBILITY GOAL STATUS: HCPCS | Mod: CI

## 2018-09-05 PROCEDURE — 700102 HCHG RX REV CODE 250 W/ 637 OVERRIDE(OP): Performed by: PHYSICIAN ASSISTANT

## 2018-09-05 PROCEDURE — G8980 MOBILITY D/C STATUS: HCPCS | Mod: CI

## 2018-09-05 PROCEDURE — G0378 HOSPITAL OBSERVATION PER HR: HCPCS

## 2018-09-05 PROCEDURE — 700112 HCHG RX REV CODE 229: Performed by: PHYSICIAN ASSISTANT

## 2018-09-05 PROCEDURE — 700101 HCHG RX REV CODE 250: Performed by: PHYSICIAN ASSISTANT

## 2018-09-05 PROCEDURE — G8978 MOBILITY CURRENT STATUS: HCPCS | Mod: CI

## 2018-09-05 PROCEDURE — 85027 COMPLETE CBC AUTOMATED: CPT

## 2018-09-05 PROCEDURE — 96372 THER/PROPH/DIAG INJ SC/IM: CPT

## 2018-09-05 PROCEDURE — G8987 SELF CARE CURRENT STATUS: HCPCS | Mod: CI

## 2018-09-05 RX ADMIN — OXYCODONE HYDROCHLORIDE 10 MG: 10 TABLET ORAL at 20:41

## 2018-09-05 RX ADMIN — DOCUSATE SODIUM 100 MG: 100 CAPSULE, LIQUID FILLED ORAL at 17:07

## 2018-09-05 RX ADMIN — OXYCODONE HYDROCHLORIDE 10 MG: 10 TABLET ORAL at 05:05

## 2018-09-05 RX ADMIN — DOCUSATE SODIUM 100 MG: 100 CAPSULE, LIQUID FILLED ORAL at 05:05

## 2018-09-05 RX ADMIN — POTASSIUM CHLORIDE, DEXTROSE MONOHYDRATE AND SODIUM CHLORIDE 100 ML: 150; 5; 450 INJECTION, SOLUTION INTRAVENOUS at 05:15

## 2018-09-05 RX ADMIN — OXYCODONE HYDROCHLORIDE 10 MG: 10 TABLET ORAL at 15:40

## 2018-09-05 RX ADMIN — ACETAMINOPHEN 650 MG: 325 TABLET, FILM COATED ORAL at 23:55

## 2018-09-05 RX ADMIN — METHOCARBAMOL 750 MG: 750 TABLET, FILM COATED ORAL at 17:11

## 2018-09-05 RX ADMIN — ENOXAPARIN SODIUM 40 MG: 100 INJECTION SUBCUTANEOUS at 08:17

## 2018-09-05 RX ADMIN — LISINOPRIL 10 MG: 10 TABLET ORAL at 05:05

## 2018-09-05 RX ADMIN — ANTACID TABLETS 500 MG: 500 TABLET, CHEWABLE ORAL at 17:07

## 2018-09-05 RX ADMIN — SENNOSIDES AND DOCUSATE SODIUM 1 TABLET: 8.6; 5 TABLET ORAL at 20:41

## 2018-09-05 RX ADMIN — ACETAMINOPHEN 650 MG: 325 TABLET, FILM COATED ORAL at 05:04

## 2018-09-05 RX ADMIN — OXYCODONE HYDROCHLORIDE 10 MG: 10 TABLET ORAL at 23:55

## 2018-09-05 RX ADMIN — POLYETHYLENE GLYCOL 3350 1 PACKET: 17 POWDER, FOR SOLUTION ORAL at 15:40

## 2018-09-05 RX ADMIN — METHOCARBAMOL 750 MG: 750 TABLET, FILM COATED ORAL at 08:17

## 2018-09-05 RX ADMIN — CEFAZOLIN SODIUM 2 G: 2 INJECTION, SOLUTION INTRAVENOUS at 05:04

## 2018-09-05 RX ADMIN — SULFAMETHOXAZOLE AND TRIMETHOPRIM 1 TABLET: 800; 160 TABLET ORAL at 17:07

## 2018-09-05 RX ADMIN — ACETAMINOPHEN 650 MG: 325 TABLET, FILM COATED ORAL at 17:07

## 2018-09-05 RX ADMIN — ANTACID TABLETS 500 MG: 500 TABLET, CHEWABLE ORAL at 05:05

## 2018-09-05 RX ADMIN — MAGNESIUM HYDROXIDE 30 ML: 400 SUSPENSION ORAL at 15:40

## 2018-09-05 RX ADMIN — ACETAMINOPHEN 650 MG: 325 TABLET, FILM COATED ORAL at 10:49

## 2018-09-05 RX ADMIN — OXYCODONE HYDROCHLORIDE 10 MG: 10 TABLET ORAL at 10:49

## 2018-09-05 ASSESSMENT — PAIN SCALES - GENERAL
PAINLEVEL_OUTOF10: 7
PAINLEVEL_OUTOF10: 4
PAINLEVEL_OUTOF10: 3
PAINLEVEL_OUTOF10: 7
PAINLEVEL_OUTOF10: 4
PAINLEVEL_OUTOF10: 6
PAINLEVEL_OUTOF10: 4

## 2018-09-05 ASSESSMENT — ACTIVITIES OF DAILY LIVING (ADL): TOILETING: INDEPENDENT

## 2018-09-05 ASSESSMENT — COGNITIVE AND FUNCTIONAL STATUS - GENERAL
DAILY ACTIVITIY SCORE: 24
SUGGESTED CMS G CODE MODIFIER DAILY ACTIVITY: CH
SUGGESTED CMS G CODE MODIFIER MOBILITY: CI
MOBILITY SCORE: 23
CLIMB 3 TO 5 STEPS WITH RAILING: A LITTLE

## 2018-09-05 ASSESSMENT — GAIT ASSESSMENTS
DISTANCE (FEET): 500
ASSISTIVE DEVICE: FRONT WHEEL WALKER
GAIT LEVEL OF ASSIST: SUPERVISED

## 2018-09-05 NOTE — CARE PLAN
Problem: Infection  Goal: Will remain free from infection  Outcome: PROGRESSING AS EXPECTED  Pt remains afebrile, explained that will keep dressing on until drain removal tomorrow to prevent infection.

## 2018-09-05 NOTE — CARE PLAN
Problem: Communication  Goal: The ability to communicate needs accurately and effectively will improve  Outcome: PROGRESSING AS EXPECTED  Pt is able to verbalize his needs. POC discussed, pt verbalized understanding.    Problem: Infection  Goal: Will remain free from infection  Outcome: PROGRESSING AS EXPECTED  NO signs/symptoms of infection observed. Antibiotics administered per MAR.

## 2018-09-05 NOTE — PROGRESS NOTES
Neurosurgery Progress Note    Subjective:  POD1 L2-4 redo open lami  Doing well  Denies leg pain  Mobilizing some  [id number]    Exam:  Dressing with slight strike through  Motor 5/5  No calf tenderness      BP  Min: 115/66  Max: 144/87  Pulse  Av.5  Min: 55  Max: 79  Resp  Avg: 15  Min: 12  Max: 18  Temp  Av.6 °C (97.9 °F)  Min: 36.2 °C (97.1 °F)  Max: 37.1 °C (98.8 °F)  SpO2  Av %  Min: 90 %  Max: 99 %    No Data Recorded    Recent Labs      18   0351   WBC  9.0   RBC  4.14*   HEMOGLOBIN  13.3*   HEMATOCRIT  40.4*   MCV  97.6   MCH  32.1   MCHC  32.9*   RDW  51.6*   PLATELETCT  201   MPV  9.2     Recent Labs      18   0351   SODIUM  137   POTASSIUM  4.5   CHLORIDE  107   CO2  25   GLUCOSE  162*   BUN  20   CREATININE  1.27   CALCIUM  9.4               Intake/Output       18 0700 - 18 0659 18 07 - 18 0659      [id number] 7460-9415 Total 1345-6414 8107-4584 Total       Intake    P.O.  --  -- --  200  -- 200    P.O. -- -- -- 200 -- 200    I.V.  1000  -- 1000  1200  -- 1200    Crystalloid Intake 1000 -- 1000 -- -- --    IV Piggyback Volume (IV Piggyback) -- -- -- 200 -- 200    IV Volume (IVF) -- -- -- 1000 -- 1000    Total Intake 1000 -- 1000 1400 -- 1400       Output    Urine  --  -- --  --  -- --    Number of Times Voided 2 x 2 x 4 x -- -- --    Drains  --  220 220  --  -- --    Output (ml) (Surgical Drain Group Back Hemovac) -- 220 220 -- -- --    Blood  25  -- 25  --  -- --    Est. Blood Loss (mL) 25 -- 25 -- -- --    Total Output 25 220 245 -- -- --       Net I/O     975 - -- 1400            Intake/Output Summary (Last 24 hours) at 09/05/18 0832  Last data filed at 18 0700   Gross per 24 hour   Intake             2400 ml   Output              245 ml   Net             2155 ml            • lidocaine  0.5 mL Once PRN   • LR   Continuous   • docusate sodium  100 mg BID   • senna-docusate  1 Tab Nightly   • senna-docusate  1 Tab Q24HRS PRN   •  polyethylene glycol/lytes  1 Packet BID PRN   • magnesium hydroxide  30 mL QDAY PRN   • bisacodyl  10 mg Q24HRS PRN   • fleet  1 Each Once PRN   • MD ROQUE...DO NOT ADMINISTER NSAIDS or ASPIRIN unless ORDERED By Neurosurgery  1 Each PRN   • Pharmacy Consult Request  1 Each PRN   • dextrose 5 % and 0.45 % NaCl with KCl 20 mEq   Continuous   • enoxaparin  40 mg DAILY   • diphenhydrAMINE  25 mg Q6HRS PRN    Or   • diphenhydrAMINE  25 mg Q6HRS PRN   • ondansetron  4 mg Q4HRS PRN   • ondansetron  4 mg Q4HRS PRN   • methocarbamol  750 mg Q8HRS PRN   • ALPRAZolam  0.25 mg BID PRN   • labetalol  10 mg Q HOUR PRN   • hydrALAZINE  10 mg Q HOUR PRN   • cloNIDine  0.1 mg Q4HRS PRN   • benzocaine-menthol  1 Lozenge Q2HRS PRN   • calcium carbonate  500 mg BID   • acetaminophen  650 mg Q6HRS   • oxyCODONE immediate-release  5 mg Q3HRS PRN   • oxyCODONE immediate release  10 mg Q3HRS PRN   • HYDROmorphone  0.5-1 mg Q3HRS PRN   • sulfamethoxazole-trimethoprim  1 Tab Q12HRS   • lisinopril  10 mg DAILY       Assessment and Plan:  POD #1  Mobilize PT/OT  Leave drain in place  Prophylactic anticoagulation: yes  Home tomorrow

## 2018-09-05 NOTE — PROGRESS NOTES
Pt arrived to floor, ambulated to bathroom, voided. Pt has IV fluids running, tolerating PO's. Denies any pain, given muscle relaxer PRN and scheduled tylenol. Hemovac in place. Will continue to monitor pt's pain.

## 2018-09-05 NOTE — THERAPY
"Physical Therapy Evaluation completed.   Bed Mobility:  Supine to Sit: Supervised (demo'd log roll)  Transfers: Sit to Stand: Supervised  Gait: Level Of Assist: Supervised with Front-Wheel Walker       Plan of Care: Patient with no further skilled PT needs in the acute care setting at this time  Discharge Recommendations: Equipment: No Equipment Needed. Post-acute therapy Discharge to home with outpatient for additional skilled therapy services.    Pt s/p lumbar surgery and presents with mild report of pain to back and incision. He completed bed mob, transfers, and 500' of gait with FWW all at a supervised level. At this time, pt does not require further acute PT services and appears functionally capable of return home with OP follow up.     See \"Rehab Therapy-Acute\" Patient Summary Report for complete documentation.     "

## 2018-09-05 NOTE — PROGRESS NOTES
Pt is A & O x 4, very pleasant. Reports 4/10 pain, controlled on PRN robaxin and oxycodone 10 mg. Saline locked. Hemovac in place, serosanguinous output. Dressing small amount of dried drainage. Pt mobilizing steady gait with FWW. Calls appropriately. Bed alarm refused. Pt is voiding adequately however reports no flatus passed yet. Will continue to monitor pain.

## 2018-09-05 NOTE — PROGRESS NOTES
Pt assessed, A&Ox4, c/o pain 3/10, medicated per MAR. Denies any needs/discomfort at this time. Bed locked in lowest position, call light and personal belongings within reach.

## 2018-09-05 NOTE — THERAPY
"Occupational Therapy Evaluation completed.   Functional Status: Up in hallway walking w/nsg and fww, walking w/spv no lob no c/o pain or fatigue. Verbalized understanding and application of spinal precautions, returned too room spv w/txf to chair, demonstrated mod I w/LB dressing. Reports having all needed DME at home. Remained in chair Rn aware   Plan of Care: Patient with no further skilled OT needs in the acute care setting at this time  Discharge Recommendations:  Equipment: No Equipment Needed. Post-acute therapy Currently anticipate no further skilled therapy needs once patient is discharged from the inpatient setting.    See \"Rehab Therapy-Acute\" Patient Summary Report for complete documentation.    "

## 2018-09-05 NOTE — CARE PLAN
Problem: Fluid Volume:  Goal: Will maintain balanced intake and output  Outcome: PROGRESSING AS EXPECTED  Pt is taking in adequate PO, saline locked.     Problem: Pain Management  Goal: Pain level will decrease to patient's comfort goal  Outcome: PROGRESSING AS EXPECTED  Pt denies any pain at this time.

## 2018-09-06 ENCOUNTER — PATIENT OUTREACH (OUTPATIENT)
Dept: HEALTH INFORMATION MANAGEMENT | Facility: OTHER | Age: 73
End: 2018-09-06

## 2018-09-06 LAB
ANION GAP SERPL CALC-SCNC: 5 MMOL/L (ref 0–11.9)
BUN SERPL-MCNC: 21 MG/DL (ref 8–22)
CALCIUM SERPL-MCNC: 9.3 MG/DL (ref 8.5–10.5)
CHLORIDE SERPL-SCNC: 105 MMOL/L (ref 96–112)
CO2 SERPL-SCNC: 25 MMOL/L (ref 20–33)
CREAT SERPL-MCNC: 1.34 MG/DL (ref 0.5–1.4)
ERYTHROCYTE [DISTWIDTH] IN BLOOD BY AUTOMATED COUNT: 51.3 FL (ref 35.9–50)
GLUCOSE SERPL-MCNC: 113 MG/DL (ref 65–99)
HCT VFR BLD AUTO: 42.4 % (ref 42–52)
HGB BLD-MCNC: 14 G/DL (ref 14–18)
MCH RBC QN AUTO: 32 PG (ref 27–33)
MCHC RBC AUTO-ENTMCNC: 33 G/DL (ref 33.7–35.3)
MCV RBC AUTO: 97 FL (ref 81.4–97.8)
PLATELET # BLD AUTO: 208 K/UL (ref 164–446)
PMV BLD AUTO: 9.2 FL (ref 9–12.9)
POTASSIUM SERPL-SCNC: 4.7 MMOL/L (ref 3.6–5.5)
RBC # BLD AUTO: 4.37 M/UL (ref 4.7–6.1)
SODIUM SERPL-SCNC: 135 MMOL/L (ref 135–145)
WBC # BLD AUTO: 9.6 K/UL (ref 4.8–10.8)

## 2018-09-06 PROCEDURE — G0378 HOSPITAL OBSERVATION PER HR: HCPCS

## 2018-09-06 PROCEDURE — A9270 NON-COVERED ITEM OR SERVICE: HCPCS | Performed by: PHYSICIAN ASSISTANT

## 2018-09-06 PROCEDURE — 96372 THER/PROPH/DIAG INJ SC/IM: CPT

## 2018-09-06 PROCEDURE — 85027 COMPLETE CBC AUTOMATED: CPT

## 2018-09-06 PROCEDURE — 700102 HCHG RX REV CODE 250 W/ 637 OVERRIDE(OP): Performed by: PHYSICIAN ASSISTANT

## 2018-09-06 PROCEDURE — 80048 BASIC METABOLIC PNL TOTAL CA: CPT

## 2018-09-06 PROCEDURE — 700112 HCHG RX REV CODE 229: Performed by: PHYSICIAN ASSISTANT

## 2018-09-06 PROCEDURE — 700111 HCHG RX REV CODE 636 W/ 250 OVERRIDE (IP): Performed by: PHYSICIAN ASSISTANT

## 2018-09-06 PROCEDURE — 36415 COLL VENOUS BLD VENIPUNCTURE: CPT

## 2018-09-06 RX ORDER — OXYCODONE HYDROCHLORIDE 5 MG/1
5 TABLET ORAL EVERY 4 HOURS PRN
Qty: 40 TAB | Refills: 0 | Status: SHIPPED | OUTPATIENT
Start: 2018-09-06 | End: 2018-09-07

## 2018-09-06 RX ORDER — POLYETHYLENE GLYCOL 3350 17 G/17G
17 POWDER, FOR SOLUTION ORAL DAILY
Refills: 3 | COMMUNITY
Start: 2018-09-06 | End: 2019-05-30

## 2018-09-06 RX ORDER — POLYETHYLENE GLYCOL 3350 17 G/17G
1 POWDER, FOR SOLUTION ORAL DAILY
Status: DISCONTINUED | OUTPATIENT
Start: 2018-09-06 | End: 2018-09-07 | Stop reason: HOSPADM

## 2018-09-06 RX ORDER — SULFAMETHOXAZOLE AND TRIMETHOPRIM 800; 160 MG/1; MG/1
1 TABLET ORAL 2 TIMES DAILY
Qty: 10 TAB | Refills: 0 | Status: SHIPPED | OUTPATIENT
Start: 2018-09-06 | End: 2018-09-07

## 2018-09-06 RX ORDER — METHOCARBAMOL 750 MG/1
750 TABLET, FILM COATED ORAL 3 TIMES DAILY PRN
Qty: 60 TAB | Refills: 0 | Status: SHIPPED | OUTPATIENT
Start: 2018-09-06 | End: 2018-09-07

## 2018-09-06 RX ADMIN — METHOCARBAMOL 750 MG: 750 TABLET, FILM COATED ORAL at 17:16

## 2018-09-06 RX ADMIN — SULFAMETHOXAZOLE AND TRIMETHOPRIM 1 TABLET: 800; 160 TABLET ORAL at 04:48

## 2018-09-06 RX ADMIN — SULFAMETHOXAZOLE AND TRIMETHOPRIM 1 TABLET: 800; 160 TABLET ORAL at 17:15

## 2018-09-06 RX ADMIN — ACETAMINOPHEN 650 MG: 325 TABLET, FILM COATED ORAL at 17:16

## 2018-09-06 RX ADMIN — OXYCODONE HYDROCHLORIDE 5 MG: 5 TABLET ORAL at 07:47

## 2018-09-06 RX ADMIN — ENOXAPARIN SODIUM 40 MG: 100 INJECTION SUBCUTANEOUS at 04:47

## 2018-09-06 RX ADMIN — ANTACID TABLETS 500 MG: 500 TABLET, CHEWABLE ORAL at 17:16

## 2018-09-06 RX ADMIN — ACETAMINOPHEN 650 MG: 325 TABLET, FILM COATED ORAL at 11:59

## 2018-09-06 RX ADMIN — ANTACID TABLETS 500 MG: 500 TABLET, CHEWABLE ORAL at 04:47

## 2018-09-06 RX ADMIN — OXYCODONE HYDROCHLORIDE 5 MG: 5 TABLET ORAL at 08:33

## 2018-09-06 RX ADMIN — ACETAMINOPHEN 650 MG: 325 TABLET, FILM COATED ORAL at 04:47

## 2018-09-06 RX ADMIN — DOCUSATE SODIUM 100 MG: 100 CAPSULE, LIQUID FILLED ORAL at 17:16

## 2018-09-06 RX ADMIN — BISACODYL 10 MG: 10 SUPPOSITORY RECTAL at 20:50

## 2018-09-06 RX ADMIN — OXYCODONE HYDROCHLORIDE 5 MG: 5 TABLET ORAL at 20:49

## 2018-09-06 RX ADMIN — DOCUSATE SODIUM 100 MG: 100 CAPSULE, LIQUID FILLED ORAL at 04:47

## 2018-09-06 RX ADMIN — OXYCODONE HYDROCHLORIDE 10 MG: 10 TABLET ORAL at 13:58

## 2018-09-06 RX ADMIN — SENNOSIDES AND DOCUSATE SODIUM 1 TABLET: 8.6; 5 TABLET ORAL at 20:49

## 2018-09-06 RX ADMIN — METHOCARBAMOL 750 MG: 750 TABLET, FILM COATED ORAL at 08:33

## 2018-09-06 RX ADMIN — POLYETHYLENE GLYCOL 3350 1 PACKET: 17 POWDER, FOR SOLUTION ORAL at 10:35

## 2018-09-06 RX ADMIN — LISINOPRIL 10 MG: 10 TABLET ORAL at 04:48

## 2018-09-06 ASSESSMENT — PAIN SCALES - GENERAL
PAINLEVEL_OUTOF10: 4
PAINLEVEL_OUTOF10: 3
PAINLEVEL_OUTOF10: 8
PAINLEVEL_OUTOF10: 2
PAINLEVEL_OUTOF10: 3
PAINLEVEL_OUTOF10: 2
PAINLEVEL_OUTOF10: 3
PAINLEVEL_OUTOF10: 2
PAINLEVEL_OUTOF10: 2
PAINLEVEL_OUTOF10: 3
PAINLEVEL_OUTOF10: 10

## 2018-09-06 NOTE — DISCHARGE PLANNING
Care Transition Team Assessment    Information Source  Orientation : Oriented x 4  Who is responsible for making decisions for patient? : Patient    Readmission Evaluation  Is this a readmission?: No    Elopement Risk  Legal Hold: No  Ambulatory or Self Mobile in Wheelchair: Yes  Disoriented: No  Psychiatric Symptoms: None  History of Wandering: No  Elopement this Admit: No  Vocalizing Wanting to Leave: No  Displays Behaviors, Body Language Wanting to Leave: No-Not at Risk for Elopement  Elopement Risk: Not at Risk for Elopement    Interdisciplinary Discharge Planning  Does Admitting Nurse Feel This Could be a Complex Discharge?: No  Primary Care Physician: Dr. Ward  Lives with - Patient's Self Care Capacity: Alone and Able to Care For Self  Patient or legal guardian wants to designate a caregiver (see row info): No  Support Systems: Family Member(s), Friends / Neighbors  Housing / Facility: [address]  Do You Take your Prescribed Medications Regularly: Yes  Able to Return to Previous ADL's: Yes  Mobility Issues: No  Prior Services: None  Patient Expects to be Discharged to:: Home  Assistance Needed: No  Durable Medical Equipment: Not Applicable, Walker (has a FWW but does not use)    Discharge Preparedness  What is your plan after discharge?: Home with help  What are your discharge supports?: Sibling, Other (comment)  Prior Functional Level: Ambulatory, Drives Self, Independent with Activities of Daily Living  Difficulity with ADLs: None  Difficulity with IADLs: Driving    Functional Assesment  Prior Functional Level: Ambulatory, Drives Self, Independent with Activities of Daily Living    Finances  Financial Barriers to Discharge: No  Prescription Coverage: Yes    Vision / Hearing Impairment  Right Eye Vision: Wears Glasses  Left Eye Vision: Wears Glasses    Values / Beliefs / Concerns  Values / Beliefs Concerns : No  Spiritual Requests During Hospitalization: No         Domestic Abuse  Have you ever been the  victim of abuse or violence?: No  Physical Abuse or Sexual Abuse: No  Verbal Abuse or Emotional Abuse: No  Possible Abuse Reported to:: Not Applicable         Discharge Risks or Barriers  Discharge risks or barriers?: No

## 2018-09-06 NOTE — PROGRESS NOTES
Pt assessed, A&Ox4, ambulated to bathroom with front wheel walker, standby assist. Calls for assistance appropriately. C/o pain 6/10, medicated per MAR. Call light and personal belongings within reach, bed locked in lowest position; bed alarm's on.

## 2018-09-06 NOTE — CARE PLAN
Problem: Safety  Goal: Will remain free from falls  Outcome: PROGRESSING AS EXPECTED  Pt calls for assistance appropriately, ambulating, gait steady. Remains free from falls during this shift.    Problem: Infection  Goal: Will remain free from infection  Outcome: PROGRESSING AS EXPECTED  Pt assessed, medicated per MAR, no signs/symptoms of infection observed.

## 2018-09-06 NOTE — PROGRESS NOTES
Spoke with GUANACO Mathis. Updated on hemovac output even with half suction. Patient is not comfortable with discharging home with drain in place. Also updated that patient has not had a BM since prior to surgery. PRN meds have been given on multiple shifts. PO intake this shift also included 8 oz. of prune juice. Per PA, will hold patient another night, maintain homovac at half suction, and give suppository at 1700 if no BM prior to that time.

## 2018-09-06 NOTE — PROGRESS NOTES
Neurosurgery Progress Note    Subjective:  POD#2 L2-4 redo open lami  Doing well  Denies leg pain  Tolerable surgical back pain  Mobilizing  Denies any new symptoms   [id number]/8    Exam:  Wound c/d/i   Motor 5/5  Lower sensory intact       BP  Min: 107/68  Max: 121/66  Pulse  Av.8  Min: 58  Max: 66  Resp  Av.2  Min: 16  Max: 17  Temp  Av.7 °C (98 °F)  Min: 36.3 °C (97.4 °F)  Max: 36.8 °C (98.3 °F)  SpO2  Av %  Min: 92 %  Max: 96 %    No Data Recorded    Recent Labs      18   0351  18   0245   WBC  9.0  9.6   RBC  4.14*  4.37*   HEMOGLOBIN  13.3*  14.0   HEMATOCRIT  40.4*  42.4   MCV  97.6  97.0   MCH  32.1  32.0   MCHC  32.9*  33.0*   RDW  51.6*  51.3*   PLATELETCT  201  208   MPV  9.2  9.2     Recent Labs      18   03518   0245   SODIUM  137  135   POTASSIUM  4.5  4.7   CHLORIDE  107  105   CO2  25  25   GLUCOSE  162*  113*   BUN  20  21   CREATININE  1.27  1.34   CALCIUM  9.4  9.3               Intake/Output       18 - 1859 18 - 1859      1900-0659 Total  Total       Intake    P.O.  200  200 400  --  -- --    P.O. 200 200 400 -- -- --    I.V.  1200  -- 1200  --  -- --    IV Piggyback Volume (IV Piggyback) 200 -- 200 -- -- --    IV Volume (IVF) 1000 -- 1000 -- -- --    Total Intake 4973 762 6279 -- -- --       Output    Urine  --  -- --  --  -- --    Number of Times Voided 1 x -- 1 x -- -- --    Drains  90  100 190  --  -- --    Output (ml) (Surgical Drain Group Back Hemovac) 90 100 190 -- -- --    Total Output 90 100 190 -- -- --       Net I/O     0156 695 2916 -- -- --            Intake/Output Summary (Last 24 hours) at 18 0803  Last data filed at 18 0500   Gross per 24 hour   Intake              200 ml   Output              190 ml   Net               10 ml            • LR   Continuous   • docusate sodium  100 mg BID   • senna-docusate  1 Tab Nightly   • senna-docusate  1 Tab Q24HRS PRN    • polyethylene glycol/lytes  1 Packet BID PRN   • magnesium hydroxide  30 mL QDAY PRN   • bisacodyl  10 mg Q24HRS PRN   • fleet  1 Each Once PRN   • MD ALERT...DO NOT ADMINISTER NSAIDS or ASPIRIN unless ORDERED By Neurosurgery  1 Each PRN   • Pharmacy Consult Request  1 Each PRN   • dextrose 5 % and 0.45 % NaCl with KCl 20 mEq   Continuous   • enoxaparin  40 mg DAILY   • diphenhydrAMINE  25 mg Q6HRS PRN    Or   • diphenhydrAMINE  25 mg Q6HRS PRN   • ondansetron  4 mg Q4HRS PRN   • ondansetron  4 mg Q4HRS PRN   • methocarbamol  750 mg Q8HRS PRN   • ALPRAZolam  0.25 mg BID PRN   • labetalol  10 mg Q HOUR PRN   • hydrALAZINE  10 mg Q HOUR PRN   • cloNIDine  0.1 mg Q4HRS PRN   • benzocaine-menthol  1 Lozenge Q2HRS PRN   • calcium carbonate  500 mg BID   • acetaminophen  650 mg Q6HRS   • oxyCODONE immediate-release  5 mg Q3HRS PRN   • oxyCODONE immediate release  10 mg Q3HRS PRN   • HYDROmorphone  0.5-1 mg Q3HRS PRN   • sulfamethoxazole-trimethoprim  1 Tab Q12HRS   • lisinopril  10 mg DAILY       Assessment and Plan:  POD #2  Mobilize   PT/OT  Drain to 1/2 compression  If less than 30cc at neck 8hr check then pull drain and d/c home  Has f/u in office  Went over d/c instructions and answered all questions

## 2018-09-06 NOTE — DISCHARGE PLANNING
Anticipated Discharge Disposition:   home    Action:   Pt lives alone but has family and friends that can help him  His sister will pick him up.   Assessment complete.    Barriers to Discharge:   none    Plan:   Home today.

## 2018-09-07 VITALS
HEART RATE: 60 BPM | DIASTOLIC BLOOD PRESSURE: 74 MMHG | SYSTOLIC BLOOD PRESSURE: 118 MMHG | BODY MASS INDEX: 32.52 KG/M2 | HEIGHT: 72 IN | TEMPERATURE: 98.2 F | OXYGEN SATURATION: 92 % | WEIGHT: 240.08 LBS | RESPIRATION RATE: 18 BRPM

## 2018-09-07 PROCEDURE — A9270 NON-COVERED ITEM OR SERVICE: HCPCS | Performed by: PHYSICIAN ASSISTANT

## 2018-09-07 PROCEDURE — 700111 HCHG RX REV CODE 636 W/ 250 OVERRIDE (IP): Performed by: PHYSICIAN ASSISTANT

## 2018-09-07 PROCEDURE — 700102 HCHG RX REV CODE 250 W/ 637 OVERRIDE(OP): Performed by: PHYSICIAN ASSISTANT

## 2018-09-07 PROCEDURE — G0378 HOSPITAL OBSERVATION PER HR: HCPCS

## 2018-09-07 PROCEDURE — 96372 THER/PROPH/DIAG INJ SC/IM: CPT

## 2018-09-07 PROCEDURE — 700112 HCHG RX REV CODE 229: Performed by: PHYSICIAN ASSISTANT

## 2018-09-07 RX ORDER — METHOCARBAMOL 750 MG/1
750 TABLET, FILM COATED ORAL 3 TIMES DAILY
Qty: 90 TAB | Refills: 0 | Status: SHIPPED | OUTPATIENT
Start: 2018-09-07 | End: 2018-10-07

## 2018-09-07 RX ORDER — OXYCODONE HYDROCHLORIDE 5 MG/1
5 TABLET ORAL EVERY 4 HOURS PRN
Qty: 40 TAB | Refills: 0 | Status: SHIPPED | OUTPATIENT
Start: 2018-09-07 | End: 2018-09-14

## 2018-09-07 RX ORDER — SULFAMETHOXAZOLE AND TRIMETHOPRIM 800; 160 MG/1; MG/1
1 TABLET ORAL 2 TIMES DAILY
Qty: 20 TAB | Refills: 0 | Status: SHIPPED | OUTPATIENT
Start: 2018-09-07 | End: 2018-09-17

## 2018-09-07 RX ADMIN — ACETAMINOPHEN 650 MG: 325 TABLET, FILM COATED ORAL at 00:44

## 2018-09-07 RX ADMIN — ANTACID TABLETS 500 MG: 500 TABLET, CHEWABLE ORAL at 05:43

## 2018-09-07 RX ADMIN — LISINOPRIL 10 MG: 10 TABLET ORAL at 05:43

## 2018-09-07 RX ADMIN — METHOCARBAMOL 750 MG: 750 TABLET, FILM COATED ORAL at 01:32

## 2018-09-07 RX ADMIN — ACETAMINOPHEN 650 MG: 325 TABLET, FILM COATED ORAL at 05:43

## 2018-09-07 RX ADMIN — ENOXAPARIN SODIUM 40 MG: 100 INJECTION SUBCUTANEOUS at 05:43

## 2018-09-07 RX ADMIN — DOCUSATE SODIUM 100 MG: 100 CAPSULE, LIQUID FILLED ORAL at 05:43

## 2018-09-07 RX ADMIN — OXYCODONE HYDROCHLORIDE 10 MG: 10 TABLET ORAL at 10:41

## 2018-09-07 RX ADMIN — SULFAMETHOXAZOLE AND TRIMETHOPRIM 1 TABLET: 800; 160 TABLET ORAL at 05:43

## 2018-09-07 RX ADMIN — POLYETHYLENE GLYCOL 3350 1 PACKET: 17 POWDER, FOR SOLUTION ORAL at 05:42

## 2018-09-07 RX ADMIN — OXYCODONE HYDROCHLORIDE 5 MG: 5 TABLET ORAL at 05:44

## 2018-09-07 RX ADMIN — OXYCODONE HYDROCHLORIDE 5 MG: 5 TABLET ORAL at 02:52

## 2018-09-07 ASSESSMENT — PAIN SCALES - GENERAL
PAINLEVEL_OUTOF10: 7
PAINLEVEL_OUTOF10: 4
PAINLEVEL_OUTOF10: 6
PAINLEVEL_OUTOF10: 3
PAINLEVEL_OUTOF10: 2
PAINLEVEL_OUTOF10: 5
PAINLEVEL_OUTOF10: 4

## 2018-09-07 NOTE — PROGRESS NOTES
"Pt A/O x4. Pt has an IV present and patent. Pt has Hemovac present compressed to 1/2 suction. Pt on room air, but states he will need 2L at night. Pt sx site is dressed and C/D/I. Pt states his pain is \"okay\" but he is having right side / flank pain. Pt encouraged to drink water. Call light and belongings within reach. Bed alarm is on; bed is locked and in lowest position. Pt ambulated to bathroom with a steady gait. No assistive devices needed.   "

## 2018-09-07 NOTE — PROGRESS NOTES
Patient stable at time of discharge. IV removed, catheter tip intact. Discharge instructions were reviewed and all questions answered. All personal belongings returned prior to discharge.Pt waiting on ride.

## 2018-09-07 NOTE — CARE PLAN
Problem: Pain Management  Goal: Pain level will decrease to patient's comfort goal  Outcome: PROGRESSING SLOWER THAN EXPECTED  Pt complaining of right flank pain that inhibits his ability to breathe comfortably. Pt educated on POC and pain management methods currently in place. Pt repositioned and sat up in bed to assist with breathing. Ice applied to pain site and labs being monitored.

## 2018-09-07 NOTE — CARE PLAN
Problem: Safety  Goal: Will remain free from injury  Outcome: PROGRESSING AS EXPECTED  Pt uses call light appropriately. Bed alarm is on. Pt has no brace ordered and walks with a steady gait without any assistive devices.    Problem: Infection  Goal: Will remain free from infection  Outcome: PROGRESSING AS EXPECTED  No s/s of infection. Pt demonstrates appropriate hand washing and infection prevention techniques.

## 2018-09-07 NOTE — DISCHARGE INSTRUCTIONS
Discharge Instructions    Discharged to home by car with relative. Discharged via wheelchair, hospital escort: Yes.  Special equipment needed: Not Applicable    Be sure to schedule a follow-up appointment with your primary care doctor or any specialists as instructed.     Discharge Plan:   Influenza Vaccine Indication: Not indicated: Previously immunized this influenza season and > 8 years of age    I understand that a diet low in cholesterol, fat, and sodium is recommended for good health. Unless I have been given specific instructions below for another diet, I accept this instruction as my diet prescription.   Other diet: regular    Special Instructions:   Remove dressing in 5 days  No lifting or pushing > 15lbs   Limit bending and twisting  Walk often   Use MiraLax daily as needed   Use OTC Magnesium Citrate as needed for severe constipation   Normal diet   Restart Aspirin 81mg 10days from surgery   Ice back often   Use spirometer often   No driving for 2 weeks or if taking narcotics   [facility]/u [facility] weeks   Call with questions    Please change dressings this AM to guaze and tegaderm dressings prior to d/c.  D/c to home today   F/u in 2 weeks with [facility] for recheck  Call sooner if sx worsen or Concerns arise.   May shower tomorrow.   Wound care and activity restrictions as per Pre-op instruction sheet.  DC with:              1.  Bactrim-DS 1 p.o. b.i.d. #10.              2.  Oxycodone 5 mg 1 p.o. q. 4 hours p.r.n. severe pain, #40.              3.  Robaxin 750 mg 1 tab p.o. t.i.d. p.r.n., #60.              4   Mag citrate  1/2 bottle PO BID until Constipation resolves.    · Is patient discharged on Warfarin / Coumadin?   No       Lumbar Laminectomy, Care After  Refer to this sheet in the next few weeks. These instructions provide you with information on caring for yourself after your procedure. Your health care provider may also give you more specific instructions. Your treatment has been planned  according to current medical practices, but problems sometimes occur. Call your health care provider if you have any problems or questions after your procedure.  HOME CARE INSTRUCTIONS   · Check the incision twice a day for signs of infection. Some signs may include a foul-smelling, greenish or yellowish discharge from the wound, increased pain, or increased redness over the incision.  · Change your bandages about 24-36 hours after surgery, or as directed.  · You may shower once the bandage is removed, or as directed. Avoid tub baths, swimming, and hot tubs for 3 weeks or until your incision has healed completely. If you have stitches or staples, they may be removed 2-3 weeks after surgery, or as directed by your doctor.  · Daily exercise is helpful to prevent the return of problems. Walking is permitted. You may use a treadmill without an incline. Cut down on activities and exercise if you have discomfort. You may also go up and down stairs as much as you can tolerate.  · Do not lift anything heavier than 15 lb. Avoid bending or twisting at the waist. Always bend your knees.  · Maintain strength and range of motion as instructed.  · Do not drive for 2-3 weeks, or as directed by your doctor. You may be a passenger for 20-30 minutes at a time. Lying back in the passenger seat may be more comfortable for you.  · Limit your sitting to intervals of 20-30 minutes. You should lie down or walk in between sitting periods. There are no limitations for sitting in a recliner chair.  · Only take over-the-counter or prescription medicines for pain, discomfort, or fever as directed by your health care provider.  SEEK MEDICAL CARE IF:   · There is increased bleeding (more than a small spot) from the wound.  · You notice redness, swelling, or increasing pain in the wound.  · Pus is coming from the wound.  · You have a fever for more than 2-3 days.  · You notice a foul smell coming from the wound or dressing.  · You have increasing  pain in your wound.  SEEK IMMEDIATE MEDICAL CARE IF:   · You develop a rash.  · You have difficulty breathing.  · You have any allergic problems.  · You develop a headache or stiff neck that does not respond to pain relievers.  · You are unable to urinate.  · You develop new onset of pain, numbness, or weakness in the buttocks or lower extremities.  MAKE SURE YOU:   · Understand these instructions.  · Will watch your condition.  · Will get help right away if you are not doing well or get worse.     This information is not intended to replace advice given to you by your health care provider. Make sure you discuss any questions you have with your health care provider.     Document Released: 11/21/2005 Document Revised: 08/20/2014 Document Reviewed: 05/15/2014  [employer] Interactive Patient Education ©2016 [employer]    Depression / Suicide Risk    As you are discharged from this [facility] facility, it is important to learn how to keep safe from harming yourself.    Recognize the warning signs:  · Abrupt changes in personality, positive or negative- including increase in energy   · Giving away possessions  · Change in eating patterns- significant weight changes-  positive or negative  · Change in sleeping patterns- unable to sleep or sleeping all the time   · Unwillingness or inability to communicate  · Depression  · Unusual sadness, discouragement and loneliness  · Talk of wanting to die  · Neglect of personal appearance   · Rebelliousness- reckless behavior  · Withdrawal from people/activities they love  · Confusion- inability to concentrate     If you or a loved one observes any of these behaviors or has concerns about self-harm, here's what you can do:  · Talk about it- your feelings and reasons for harming yourself  · Remove any means that you might use to hurt yourself (examples: pills, rope, extension cords, firearm)  · Get professional help from the community (Mental Health, Substance Abuse, psychological  counseling)  · Do not be alone:Call your Safe Contact- someone whom you trust who will be there for you.  · Call your local CRISIS HOTLINE 572-2755 or 596-081-3659  · Call your local Children's Mobile Crisis Response Team [place] (236) 850-1931 or www.[employer]  · Call the toll free National Suicide Prevention Hotlines   · National Suicide Prevention Lifeline 123-587-SQRU (8356)  · National Hope Line Network 800-SUICIDE (375-1901)

## 2018-09-07 NOTE — PROGRESS NOTES
Neurosurgery Progress Note    Subjective:  POD#3 L2-4 redo open lami  Back and leg pain improved.  Drain output decreased yesterday and was removed last night.  Pain control adequate.  No BM.  On miralax but has had constipation after surgery before.  Mobilizing well  Denies any new symptoms   Home today      Exam:  Wound C/D/I  Leg strength 5/5, sensory Intact  DTR's +2      BP  Min: 111/68  Max: 127/83  Pulse  Av.3  Min: 62  Max: 81  Resp  Av.8  Min: 16  Max: 17  Temp  Av.7 °C (98.1 °F)  Min: 36.3 °C (97.4 °F)  Max: 37 °C (98.6 °F)  SpO2  Av.3 %  Min: 92 %  Max: 96 %    No Data Recorded    Recent Labs      18   03518   0245   WBC  9.0  9.6   RBC  4.14*  4.37*   HEMOGLOBIN  13.3*  14.0   HEMATOCRIT  40.4*  42.4   MCV  97.6  97.0   MCH  32.1  32.0   MCHC  32.9*  33.0*   RDW  51.6*  51.3*   PLATELETCT  201  208   MPV  9.2  9.2     Recent Labs      18   03518   0245   SODIUM  137  135   POTASSIUM  4.5  4.7   CHLORIDE  107  105   CO2  25  25   GLUCOSE  162*  113*   BUN  20  21   CREATININE  1.27  1.34   CALCIUM  9.4  9.3               Intake/Output       18 - 18 - 18 Total  Total       Intake    P.O.  --  400 400  --  -- --    P.O. -- 400 400 -- -- --    Total Intake -- 400 400 -- -- --       Output    Urine  --  -- --  --  -- --    Number of Times Voided 1 x 4 x 5 x -- -- --    Drains  50  25 75  --  -- --    Output (ml) ([REMOVED] [facility] Back Hemovac) 50 25 75 -- -- --    Stool  --  -- --  --  -- --    Number of Times Stooled -- 2 x 2 x -- -- --    Total Output 50 25 75 -- -- --       Net I/O     -50 375 325 -- -- --            Intake/Output Summary (Last 24 hours) at 18  Last data filed at 18 0600   Gross per 24 hour   Intake              400 ml   Output               75 ml   Net              325 ml            • polyethylene glycol/lytes  1 Packet  DAILY   • LR   Continuous   • docusate sodium  100 mg BID   • senna-docusate  1 Tab Nightly   • senna-docusate  1 Tab Q24HRS PRN   • magnesium hydroxide  30 mL QDAY PRN   • bisacodyl  10 mg Q24HRS PRN   • fleet  1 Each Once PRN   • MD ALERT...DO NOT ADMINISTER NSAIDS or ASPIRIN unless ORDERED By Neurosurgery  1 Each PRN   • Pharmacy Consult Request  1 Each PRN   • dextrose 5 % and 0.45 % NaCl with KCl 20 mEq   Continuous   • enoxaparin  40 mg DAILY   • diphenhydrAMINE  25 mg Q6HRS PRN    Or   • diphenhydrAMINE  25 mg Q6HRS PRN   • ondansetron  4 mg Q4HRS PRN   • ondansetron  4 mg Q4HRS PRN   • methocarbamol  750 mg Q8HRS PRN   • ALPRAZolam  0.25 mg BID PRN   • labetalol  10 mg Q HOUR PRN   • hydrALAZINE  10 mg Q HOUR PRN   • cloNIDine  0.1 mg Q4HRS PRN   • benzocaine-menthol  1 Lozenge Q2HRS PRN   • calcium carbonate  500 mg BID   • acetaminophen  650 mg Q6HRS   • oxyCODONE immediate-release  5 mg Q3HRS PRN   • oxyCODONE immediate release  10 mg Q3HRS PRN   • HYDROmorphone  0.5-1 mg Q3HRS PRN   • sulfamethoxazole-trimethoprim  1 Tab Q12HRS   • lisinopril  10 mg DAILY       Assessment and Plan:  POD#3 L2-4 redo open lami  Drain out and improving back and leg pain.  Set up for D/C yesterday but held until drain rmoved.  D/c to home today   F/u in 2 weeks with [facility] for recheck  Call sooner if sx worsen or Concerns arise.   Will change dressings this AM to guaze and tegaderm dressings prior to d/c.  May shower tomorrow.   Wound care and activity restrictions as per Pre-op instruction sheet.  DC with:   1.  Bactrim-DS 1 p.o. b.i.d. #10.   2.  Oxycodone 5 mg 1 p.o. q. 4 hours p.r.n. severe pain, #40.   3.  Robaxin 750 mg 1 tab p.o. t.i.d. p.r.n., #60.   4   Mag citrate  1/2 bottle PO BID until Constipation resolves.

## 2018-09-12 ENCOUNTER — OFFICE VISIT (OUTPATIENT)
Dept: MEDICAL GROUP | Facility: PHYSICIAN GROUP | Age: 73
End: 2018-09-12
Payer: MEDICARE

## 2018-09-12 VITALS
TEMPERATURE: 98.6 F | BODY MASS INDEX: 31.97 KG/M2 | DIASTOLIC BLOOD PRESSURE: 80 MMHG | HEIGHT: 72 IN | HEART RATE: 59 BPM | SYSTOLIC BLOOD PRESSURE: 106 MMHG | WEIGHT: 236 LBS | RESPIRATION RATE: 16 BRPM | OXYGEN SATURATION: 97 %

## 2018-09-12 DIAGNOSIS — Z98.890 S/P LUMBAR LAMINECTOMY: ICD-10-CM

## 2018-09-12 DIAGNOSIS — Z23 NEED FOR VACCINATION: ICD-10-CM

## 2018-09-12 PROCEDURE — G0008 ADMIN INFLUENZA VIRUS VAC: HCPCS | Performed by: PHYSICIAN ASSISTANT

## 2018-09-12 PROCEDURE — 90662 IIV NO PRSV INCREASED AG IM: CPT | Performed by: PHYSICIAN ASSISTANT

## 2018-09-12 PROCEDURE — 99213 OFFICE O/P EST LOW 20 MIN: CPT | Mod: 25 | Performed by: PHYSICIAN ASSISTANT

## 2018-09-12 ASSESSMENT — PAIN SCALES - GENERAL: PAINLEVEL: 3=SLIGHT PAIN

## 2018-09-12 NOTE — PROGRESS NOTES
Chief Complaint   Patient presents with   • Hospital Follow-up     back surgery       HISTORY OF PRESENT ILLNESS: Valeriy Orlando is an established 72 y.o. male here to discuss the evaluation and management of:    S/P lumbar laminectomy  Status post lumbar laminectomy discectomy. L2-L3 open laminectomy and redo L3-L4 laminectomy.  Surgery was on 9/4/18 and performed on Dr. Claudio without complication.  He tells me that he is feeling well. He mentions that he is still experiencing mild back pain from operation, but is hopeful that symptoms will resolve.  States he is only taking prescribed pain medication Roxicodone when needed.  He tells me that he may have taken a total of 9 tablets.  States he is looking forward to traveling and getting back to being more active.  Denies fever, chills, nausea, vomiting, abnormal incision discharge, warmth or erythema.      Patient Active Problem List    Diagnosis Date Noted   • S/P lumbar laminectomy 09/12/2018   • Chronic right-sided low back pain without sciatica 07/06/2018   • Elevated LDL cholesterol level 05/09/2018   • Nonrheumatic aortic valve insufficiency 09/13/2017   • Essential hypertension 05/03/2017   • Prediabetes 05/03/2017   • Gastroesophageal reflux disease without esophagitis 05/03/2017   • Obesity (BMI 30-39.9) 05/03/2017   • Abnormal stress test 05/03/2017   • MAVERICK (obstructive sleep apnea) 05/03/2017   • Degeneration of lumbar intervertebral disc 11/29/2016       Allergies:Ibuprofen    Current Outpatient Prescriptions   Medication Sig Dispense Refill   • oxyCODONE immediate-release (ROXICODONE) 5 MG Tab Take 1 Tab by mouth every four hours as needed for up to 7 days. 40 Tab 0   • methocarbamol (ROBAXIN) 750 MG Tab Take 1 Tab by mouth 3 times a day for 30 days. 90 Tab 0   • sulfamethoxazole-trimethoprim (BACTRIM DS) 800-160 MG tablet Take 1 Tab by mouth 2 times a day for 10 days. 20 Tab 0   • magnesium citrate Solution Take 150 mL by mouth 2 times a day. 2  Bottle 0   • polyethylene glycol/lytes (MIRALAX) Pack Take 1 Packet by mouth every day.  3   • omeprazole (PRILOSEC) 20 MG delayed-release capsule TAKE 1 CAP BY MOUTH AS NEEDED (FOR HEARTBURN). 90 Cap 3   • lisinopril (PRINIVIL) 10 MG Tab TAKE 1 TAB BY MOUTH EVERY DAY. 90 Tab 3     No current facility-administered medications for this visit.        Social History   Substance Use Topics   • Smoking status: Never Smoker   • Smokeless tobacco: Never Used   • Alcohol use 0.6 oz/week     1 Glasses of wine per week      Comment: 1/2 glass wine per day        Family Status   Relation Status   • Mo    • Fa    • Sis Alive   • Sis Alive   • MGMo (Not Specified)   • PGFa (Not Specified)     Family History   Problem Relation Age of Onset   • Heart Disease Mother    • Cancer Mother         lung cancer   • Heart Disease Maternal Grandmother    • Diabetes Paternal Grandfather        ROS:  Review of Systems   Constitutional: Negative for fever, chills, weight loss and malaise/fatigue.   HENT: Negative for ear pain, nosebleeds, congestion, sore throat and neck pain.    Eyes: Negative for blurred vision.   Respiratory: Negative for cough, sputum production, shortness of breath and wheezing.    Cardiovascular: Negative for chest pain, palpitations, orthopnea and leg swelling.   Gastrointestinal: Negative for heartburn, nausea, vomiting and abdominal pain.   Genitourinary: Negative for dysuria, urgency and frequency.   Musculoskeletal: Negative for myalgias and joint pain. + for back pain.   Skin: Negative for rash and itching. + for vertical lumbar incision with no signs of infection.  Neurological: Negative for dizziness, tingling, tremors, sensory change, focal weakness and headaches.   Endo/Heme/Allergies: Does not bruise/bleed easily.   Psychiatric/Behavioral: Negative for depression, suicidal ideas and memory loss.  The patient is not nervous/anxious and does not have insomnia.    All other systems reviewed and  "are negative except as in HPI.    Exam: Blood pressure 106/80, pulse (!) 59, temperature 37 °C (98.6 °F), resp. rate 16, height 1.831 m (6' 0.1\"), weight 107 kg (236 lb), SpO2 97 %. Body mass index is 31.92 kg/m².  General: Normal appearing. No distress.  HEENT: Normocephalic. Eyes conjunctiva clear lids without ptosis, ears normal shape and contour.  Neck: Supple without JVD or bruit. Thyroid is not enlarged.  Pulmonary: Clear to ausculation.  Normal effort. No rales, ronchi, or wheezing.  Cardiovascular: Regular rate and rhythm without murmur.   Abdomen: Soft, nontender, nondistended. Normal bowel sounds. Liver and spleen are not palpable  Neurologic: Grossly nonfocal.  Cranial nerves are normal.  Lymph: No cervical, supraclavicular or axillary lymph nodes are palpable  Skin: Warm and dry.  No rashes or suspicious skin lesions.  Positive for vertical incision over lumbar region.  No signs of infection.  Incision is healing appropriately.  Negative for warmth/erythema/abnormal discharge.  Musculoskeletal: Normal gait. No extremity cyanosis, clubbing, or edema.  Psych: Normal mood and affect. Alert and oriented x3. Judgment and insight is normal.    Medical decision-making and discussion:  1. S/P lumbar laminectomy  No signs of infection.  Incision is healing appropriately.  Patient is feeling well.  Continue following up with surgeon as indicated.  Will continue to monitor.    2. Need for vaccination  A flu vaccination was administered to patient without complication. A VIS form was provided to patient.     - INFLUENZA VACCINE, HIGH DOSE (65+ ONLY)        Please note that this dictation was created using voice recognition software. I have made every reasonable attempt to correct obvious errors, but I expect that there are errors of grammar and possibly content that I did not discover before finalizing the note.      Return if symptoms worsen or fail to improve.  "

## 2018-09-12 NOTE — ASSESSMENT & PLAN NOTE
Status post lumbar laminectomy discectomy. L2-L3 open laminectomy and redo L3-L4 laminectomy.  Surgery was on 9/4/18 and performed on Dr. Claudio without complication.  He tells me that he is feeling well. He mentions that he is still experiencing mild back pain from operation, but is hopeful that symptoms will resolve.  States he is only taking prescribed pain medication Roxicodone when needed.  He tells me that he may have taken a total of 9 tablets.  States he is looking forward to traveling and getting back to being more active.  Denies fever, chills, nausea, vomiting, abnormal incision discharge, warmth or erythema.

## 2018-12-28 ENCOUNTER — OFFICE VISIT (OUTPATIENT)
Dept: URGENT CARE | Facility: CLINIC | Age: 73
End: 2018-12-28
Payer: MEDICARE

## 2018-12-28 VITALS
RESPIRATION RATE: 20 BRPM | HEART RATE: 87 BPM | WEIGHT: 242 LBS | TEMPERATURE: 98.4 F | SYSTOLIC BLOOD PRESSURE: 122 MMHG | BODY MASS INDEX: 32.73 KG/M2 | DIASTOLIC BLOOD PRESSURE: 70 MMHG | OXYGEN SATURATION: 96 %

## 2018-12-28 DIAGNOSIS — L30.9 DERMATITIS: ICD-10-CM

## 2018-12-28 PROCEDURE — 99213 OFFICE O/P EST LOW 20 MIN: CPT | Performed by: PHYSICIAN ASSISTANT

## 2018-12-28 ASSESSMENT — ENCOUNTER SYMPTOMS
CONSTIPATION: 0
SHORTNESS OF BREATH: 0
ABDOMINAL PAIN: 0
MYALGIAS: 0
EYE DISCHARGE: 0
NAUSEA: 0
VOMITING: 0
COUGH: 0
HEADACHES: 0
CHILLS: 0
ANOREXIA: 0
DIARRHEA: 0
EYE PAIN: 0
FEVER: 0
SORE THROAT: 0
FATIGUE: 0

## 2018-12-28 NOTE — PROGRESS NOTES
Subjective:   Valeriy Orlando is a 73 y.o. male who presents for Rash (upper ABD region/ itchy x 3-4 days )       Rash   This is a new problem. Episode onset: 3-4 days ago. The problem is unchanged. The affected locations include the abdomen. The rash is characterized by itchiness. He was exposed to nothing. Pertinent negatives include no anorexia, congestion, cough, diarrhea, eye pain, fatigue, fever, shortness of breath, sore throat or vomiting. Treatments tried: lotion, aloe vera. The treatment provided mild relief.     Review of Systems   Constitutional: Negative for chills, fatigue and fever.   HENT: Negative for congestion and sore throat.    Eyes: Negative for pain and discharge.   Respiratory: Negative for cough and shortness of breath.    Cardiovascular: Negative for chest pain.   Gastrointestinal: Negative for abdominal pain, anorexia, constipation, diarrhea, nausea and vomiting.   Musculoskeletal: Negative for myalgias.   Skin: Positive for itching and rash.   Neurological: Negative for headaches.       PMH:  has a past medical history of Depression; Diabetes (HCC); Heart burn; Hepatitis A (2001); High cholesterol; Hypertension; Prediabetes; Sleep apnea; and Snoring.    MEDS:   Current Outpatient Prescriptions:   •  hydrocortisone 2.5 % Cream topical cream, Apply to affected area twice daily, Disp: 30 g, Rfl: 0  •  magnesium citrate Solution, Take 150 mL by mouth 2 times a day., Disp: 2 Bottle, Rfl: 0  •  polyethylene glycol/lytes (MIRALAX) Pack, Take 1 Packet by mouth every day., Disp: , Rfl: 3  •  omeprazole (PRILOSEC) 20 MG delayed-release capsule, TAKE 1 CAP BY MOUTH AS NEEDED (FOR HEARTBURN)., Disp: 90 Cap, Rfl: 3  •  lisinopril (PRINIVIL) 10 MG Tab, TAKE 1 TAB BY MOUTH EVERY DAY., Disp: 90 Tab, Rfl: 3    ALLERGIES:   Allergies   Allergen Reactions   • Advil [Ibuprofen]      PCP does not want him on this due to kidneys       SURGHX:   Past Surgical History:   Procedure Laterality Date   • LUMBAR  LAMINECTOMY DISKECTOMY  9/4/2018    Procedure: LUMBAR LAMINECTOMY DISKECTOMY-  L2-3 OPEN LAMI AND REDO L3-4 LAMI;  Surgeon: Bentley Claudio M.D.;  Location: SURGERY [facility];  Service: Neurosurgery   • LUMBAR LAMINECTOMY DISKECTOMY  11/29/2016    Procedure: LUMBAR LAMINECTOMY DISKECTOMY L3-5 open laminectomy ;  Surgeon: Bentley Claudio M.D.;  Location: SURGERY [facility];  Service:    • OTHER ORTHOPEDIC SURGERY  1990    right knee scope   • GROIN EXPLORATION      growth   • KNEE ARTHROSCOPY         SOCHX:  reports that he has never smoked. He has never used smokeless tobacco. He reports that he drinks about 0.6 oz of alcohol per week . He reports that he uses drugs.    FH: Reviewed with patient, not pertinent to this visit.     Objective:   /70   Pulse 87   Temp 36.9 °C (98.4 °F)   Resp 20   Wt 109.8 kg (242 lb)   SpO2 96%   BMI 32.73 kg/m²   Physical Exam   Constitutional: He is oriented to person, place, and time. He appears well-developed and well-nourished. No distress.   HENT:   Head: Normocephalic and atraumatic.   Right Ear: External ear normal.   Left Ear: External ear normal.   Nose: Nose normal.   Eyes: Conjunctivae and EOM are normal.   Neck: No tracheal deviation present.   Pulmonary/Chest: Effort normal. No respiratory distress.   Neurological: He is alert and oriented to person, place, and time.   Skin: Skin is warm and dry. Rash noted. Rash is maculopapular.        Mild diffuse maculopapular erythematous rash underneath breasts/upper abdomen. No vesicles, discharge.   Psychiatric: He has a normal mood and affect. His behavior is normal. Judgment and thought content normal.       Assessment/Plan:   1. Dermatitis  - hydrocortisone 2.5 % Cream topical cream; Apply to affected area twice daily  Dispense: 30 g; Refill: 0  - Advised to keep area clean and dry  - Advised to return if symptoms worsen or do not improve    Differential diagnosis, natural history, supportive care, and  indications for immediate follow-up discussed.

## 2019-01-23 ENCOUNTER — HOSPITAL ENCOUNTER (OUTPATIENT)
Dept: LAB | Facility: MEDICAL CENTER | Age: 74
End: 2019-01-23
Attending: FAMILY MEDICINE
Payer: MEDICARE

## 2019-01-23 ENCOUNTER — OFFICE VISIT (OUTPATIENT)
Dept: MEDICAL GROUP | Facility: PHYSICIAN GROUP | Age: 74
End: 2019-01-23
Payer: MEDICARE

## 2019-01-23 VITALS
OXYGEN SATURATION: 94 % | HEART RATE: 72 BPM | BODY MASS INDEX: 32.14 KG/M2 | DIASTOLIC BLOOD PRESSURE: 60 MMHG | HEIGHT: 73 IN | WEIGHT: 242.51 LBS | SYSTOLIC BLOOD PRESSURE: 110 MMHG | TEMPERATURE: 97.3 F

## 2019-01-23 DIAGNOSIS — I10 ESSENTIAL HYPERTENSION: ICD-10-CM

## 2019-01-23 DIAGNOSIS — K22.70 BARRETT'S ESOPHAGUS WITHOUT DYSPLASIA: ICD-10-CM

## 2019-01-23 DIAGNOSIS — R73.01 IMPAIRED FASTING BLOOD SUGAR: ICD-10-CM

## 2019-01-23 DIAGNOSIS — R20.0 NUMBNESS: ICD-10-CM

## 2019-01-23 LAB
ALBUMIN SERPL BCP-MCNC: 4.4 G/DL (ref 3.2–4.9)
ALBUMIN/GLOB SERPL: 1.8 G/DL
ALP SERPL-CCNC: 67 U/L (ref 30–99)
ALT SERPL-CCNC: 24 U/L (ref 2–50)
ANION GAP SERPL CALC-SCNC: 6 MMOL/L (ref 0–11.9)
AST SERPL-CCNC: 16 U/L (ref 12–45)
BILIRUB SERPL-MCNC: 0.6 MG/DL (ref 0.1–1.5)
BUN SERPL-MCNC: 28 MG/DL (ref 8–22)
CALCIUM SERPL-MCNC: 10.1 MG/DL (ref 8.5–10.5)
CHLORIDE SERPL-SCNC: 106 MMOL/L (ref 96–112)
CHOLEST SERPL-MCNC: 160 MG/DL (ref 100–199)
CO2 SERPL-SCNC: 26 MMOL/L (ref 20–33)
CREAT SERPL-MCNC: 1.26 MG/DL (ref 0.5–1.4)
EST. AVERAGE GLUCOSE BLD GHB EST-MCNC: 128 MG/DL
FASTING STATUS PATIENT QL REPORTED: NORMAL
GLOBULIN SER CALC-MCNC: 2.5 G/DL (ref 1.9–3.5)
GLUCOSE SERPL-MCNC: 118 MG/DL (ref 65–99)
HBA1C MFR BLD: 6.1 % (ref 0–5.6)
HDLC SERPL-MCNC: 72 MG/DL
LDLC SERPL CALC-MCNC: 81 MG/DL
POTASSIUM SERPL-SCNC: 4.5 MMOL/L (ref 3.6–5.5)
PROT SERPL-MCNC: 6.9 G/DL (ref 6–8.2)
SODIUM SERPL-SCNC: 138 MMOL/L (ref 135–145)
TRIGL SERPL-MCNC: 33 MG/DL (ref 0–149)

## 2019-01-23 PROCEDURE — 80061 LIPID PANEL: CPT

## 2019-01-23 PROCEDURE — 83036 HEMOGLOBIN GLYCOSYLATED A1C: CPT | Mod: GA

## 2019-01-23 PROCEDURE — 80053 COMPREHEN METABOLIC PANEL: CPT

## 2019-01-23 PROCEDURE — 36415 COLL VENOUS BLD VENIPUNCTURE: CPT

## 2019-01-23 PROCEDURE — 99214 OFFICE O/P EST MOD 30 MIN: CPT | Performed by: FAMILY MEDICINE

## 2019-01-23 ASSESSMENT — PATIENT HEALTH QUESTIONNAIRE - PHQ9: CLINICAL INTERPRETATION OF PHQ2 SCORE: 0

## 2019-01-23 NOTE — PROGRESS NOTES
Subjective:      Valeriy Orlando is a 73 y.o. male who presents with Follow-Up        HPI:  Patient is a regular patient of Dr. Ward who presents today for a recent acid reflux flare-up.    Patient has a history of King's esophagus and acid reflux. He states he has had a recent flare-up of his reflux for the past 1.5 weeks. Symptoms are similar to before, but he states it is more of an indigestion feeling. He has not changed any of his habits recently. Symptoms slightly alleviated after drinking hot fluids. He continues to drink 1 cup of coffee in the morning and a cup of tea later in the day. He has been prescribed omeprazole once daily for his reflux, but he admits to only taking it 3-4 times per week out of concerns for kidney side effects. He mostly takes the medications before going to eat certain foods. He has also received an upper endoscopy in July 2018 which showed changes consistent with King's esophagus and reflux esophagitis. Biopsy was performed which was consistent with his King's esophagus without dysplasia or malignancy. Denies any vomiting or nausea. No chest pressure/pain, shortness of breath, or near syncope during exertion.    He has been having some intermittent numbness in his left shoulder for the past 2 weeks. He states it occurs suddenly and randomly, mostly when sitting/lying in certain positions. Alleviation from changing position. Similar to past pinched nerves. . No recent injury or strain. No radiation down the arm or from the neck. No prior shoulder surgeries or issues. No relation to the stomach burning. Denies any shoulder pain.    Patient is status post lumbar laminectomy and discectomy and redo laminectomy in September 2018. He states he is still having some pain. He went through physical therapy as suggested, but he is still having pain after walking around. He is followed by [facility]. [facility] is following up on this pain. He recently received an injection into  "his back which has helped with his symptoms.    He takes Lisinopril 10 mg for his hypertension without any side effects. Blood pressure levels in the office are within goal.     He states he has prediabetes and he would like to have blood work again to check his blood sugar.  He is managing this with his own efforts.    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  Echocardiogram at that time showed EF of 70% and mild aortic insufficiency otherwise no abnormalities.  He was seen and evaluated by the cardiologist with the above abnormal nuclear stress test thought to be a false positive test.  Patient works out every day by riding a stationary bike 35 minutes or more, he lifts weights for his legs and upper body.  When he does his workout he denies any chest pain, shortness of breath or palpitations.  Was cleared for his surgery in September 2018 without any further cardiac testing with 0 cardiac risk.    Past medical history, past surgical history, family history reviewed-no changes    Social history reviewed-no changes    Allergies reviewed-no changes    Medications reviewed-no changes      ROS:  As per the HPI as shown above, the rest are negative.       Objective:     /60 (BP Location: Left arm, Patient Position: Sitting, BP Cuff Size: Adult)   Pulse 72   Temp 36.3 °C (97.3 °F) (Temporal)   Ht 1.854 m (6' 1\")   Wt 110 kg (242 lb 8.1 oz)   SpO2 94%   BMI 31.99 kg/m²     Physical Exam  Examined alert, awake, oriented, not in distress    Neck-supple, no lymphadenopathy, no thyromegaly  Lungs-clear to auscultation, no rales, no wheezes  Heart-regular rate and rhythm, no murmur  Abdomen-good bowel sounds, soft, nontender, no hepatosplenomegaly, no masses  Extremities-no edema, clubbing, cyanosis, left shoulder exam- no deformities, no skin changes, intact sensation to light touch, full " range of movement on abduction, forward elevation, internal and external rotation       Assessment/Plan:   1. 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 He has been taking his omeprazole 3 to 4 days out of the week. I have recommended that he take it daily as prescribed to help prevent risks of esophageal cancer. I have given other precautions such as decreasing caffeine consumption or completely eliminating caffeine in the diet, not eating late, and sleeping in a propped up position.  He will follow-up with Dr. Ward in 1 month.  - Lipid Profile; Future  - COMP METABOLIC PANEL; Future  - HEMOGLOBIN A1C; Future    2. Numbness  Patient has full range of motion of this shoulder. It is most likely due to a nerve issue in this area. I have recommended doing light activity with this shoulder to allow it to heal on its own. He will follow-up with PCP for additional testing if there is no resolution.  - Lipid Profile; Future  - COMP METABOLIC PANEL; Future  - HEMOGLOBIN A1C; Future    3. Impaired fasting blood sugar  Patient continues to exercise and watch his diet. I have counseled the patient on continuing to manage his diet and recommended that he try to avoid excess carbohydrates and sweets as much as possible. Labs have been ordered for the next follow up visit.    - Lipid Profile; Future  - COMP METABOLIC PANEL; Future  - HEMOGLOBIN A1C; Future    4. Essential hypertension  Stable on current medications. We will continue the current plan of care.  We will do lab work.  - Lipid Profile; Future  - COMP METABOLIC PANEL; Future  - HEMOGLOBIN A1C; Future       Leroy HARVEY ([place]), am scribing for, and in the presence of, Miya Lawton MD     Electronically signed by: Leroy Stewart ([place]), 1/23/2019    IMiya MD personally performed the services described in this documentation, as scribed by Leroy Stewart in my presence, and it is both accurate and  complete.

## 2019-02-21 ENCOUNTER — OFFICE VISIT (OUTPATIENT)
Dept: MEDICAL GROUP | Facility: PHYSICIAN GROUP | Age: 74
End: 2019-02-21
Payer: MEDICARE

## 2019-02-21 VITALS
OXYGEN SATURATION: 95 % | TEMPERATURE: 97.4 F | HEART RATE: 77 BPM | WEIGHT: 239 LBS | HEIGHT: 73 IN | RESPIRATION RATE: 14 BRPM | DIASTOLIC BLOOD PRESSURE: 80 MMHG | SYSTOLIC BLOOD PRESSURE: 128 MMHG | BODY MASS INDEX: 31.68 KG/M2

## 2019-02-21 DIAGNOSIS — Z98.890 S/P LUMBAR LAMINECTOMY: ICD-10-CM

## 2019-02-21 DIAGNOSIS — G89.29 CHRONIC RIGHT-SIDED LOW BACK PAIN WITHOUT SCIATICA: ICD-10-CM

## 2019-02-21 DIAGNOSIS — R73.03 PREDIABETES: ICD-10-CM

## 2019-02-21 DIAGNOSIS — E66.9 OBESITY (BMI 30-39.9): ICD-10-CM

## 2019-02-21 DIAGNOSIS — I10 ESSENTIAL HYPERTENSION: ICD-10-CM

## 2019-02-21 DIAGNOSIS — N18.30 STAGE 3 CHRONIC KIDNEY DISEASE (HCC): ICD-10-CM

## 2019-02-21 DIAGNOSIS — E78.00 ELEVATED LDL CHOLESTEROL LEVEL: ICD-10-CM

## 2019-02-21 DIAGNOSIS — M54.50 CHRONIC RIGHT-SIDED LOW BACK PAIN WITHOUT SCIATICA: ICD-10-CM

## 2019-02-21 PROCEDURE — 99214 OFFICE O/P EST MOD 30 MIN: CPT | Performed by: INTERNAL MEDICINE

## 2019-02-21 RX ORDER — ROSUVASTATIN CALCIUM 10 MG/1
10 TABLET, COATED ORAL EVERY EVENING
Qty: 90 TAB | Refills: 1 | Status: SHIPPED | OUTPATIENT
Start: 2019-02-21 | End: 2019-08-22 | Stop reason: SDUPTHER

## 2019-02-22 NOTE — PROGRESS NOTES
Subjective:   Valeriy Orlando is a 73 y.o. male here today for low back pain, lab work review     73-year-old male past medical history of chronic low back pain status post lumbar laminectomy, MAVERICK, GERD questionable King's esophagus, prediabetes, hypertension, obesity presented today as routine follow-up.  Her major concerns remain low back pain.  He has had 2 back surgeries and still has low back pain sometimes radiates to the left side as sciatic nerve. He is not taking anything, pain is bothersome. Not allowing him to do the things that he likes to do. He denies numbness or tingling. He is following with surgeon.   In regards to chronic medical condition blood pressure is well controlled.  He denies chest pain, shortness of breath, leg swelling, blurry vision, lightheadedness or palpitations.  GFR remains stable.  Electrolytes are normal.  A1c shows still prediabetes.  ASCVD score 18 %, even that HDL is significantly elevated. He has experienced jaw pain with statin in the past, but he is willing to try again     Current medicines (including changes today)  Current Outpatient Prescriptions   Medication Sig Dispense Refill   • rosuvastatin (CRESTOR) 10 MG Tab Take 1 Tab by mouth every evening. 90 Tab 1   • hydrocortisone 2.5 % Cream topical cream Apply to affected area twice daily 30 g 0   • polyethylene glycol/lytes (MIRALAX) Pack Take 1 Packet by mouth every day.  3   • omeprazole (PRILOSEC) 20 MG delayed-release capsule TAKE 1 CAP BY MOUTH AS NEEDED (FOR HEARTBURN). 90 Cap 3   • lisinopril (PRINIVIL) 10 MG Tab TAKE 1 TAB BY MOUTH EVERY DAY. 90 Tab 3     No current facility-administered medications for this visit.      He  has a past medical history of Depression; Diabetes (HCC); Heart burn; Hepatitis A (2001); High cholesterol; Hypertension; Prediabetes; Sleep apnea; and Snoring.    Current Outpatient Prescriptions   Medication Sig Dispense Refill   • rosuvastatin (CRESTOR) 10 MG Tab Take 1 Tab by mouth  every evening. 90 Tab 1   • hydrocortisone 2.5 % Cream topical cream Apply to affected area twice daily 30 g 0   • polyethylene glycol/lytes (MIRALAX) Pack Take 1 Packet by mouth every day.  3   • omeprazole (PRILOSEC) 20 MG delayed-release capsule TAKE 1 CAP BY MOUTH AS NEEDED (FOR HEARTBURN). 90 Cap 3   • lisinopril (PRINIVIL) 10 MG Tab TAKE 1 TAB BY MOUTH EVERY DAY. 90 Tab 3     No current facility-administered medications for this visit.        Allergies as of 02/21/2019 - Reviewed 02/21/2019   Allergen Reaction Noted   • Advil [ibuprofen]  05/17/2018       Social History     Social History   • Marital status: Single     Spouse name: N/A   • Number of children: N/A   • Years of education: N/A     Occupational History   • Not on file.     Social History Main Topics   • Smoking status: Never Smoker   • Smokeless tobacco: Never Used   • Alcohol use 0.6 oz/week     1 Glasses of wine per week      Comment: 1/2 glass wine per day    • Drug use: Yes      Comment: Uses CBD    • Sexual activity: Yes     Partners: Female     Other Topics Concern   • Not on file     Social History Narrative   • No narrative on file        Family History   Problem Relation Age of Onset   • Heart Disease Mother    • Cancer Mother         lung cancer   • Heart Disease Maternal Grandmother    • Diabetes Paternal Grandfather        Past Surgical History:   Procedure Laterality Date   • LUMBAR LAMINECTOMY DISKECTOMY  9/4/2018    Procedure: LUMBAR LAMINECTOMY DISKECTOMY-  L2-3 OPEN LAMI AND REDO L3-4 LAMI;  Surgeon: Bentley Claudio M.D.;  Location: SURGERY [facility];  Service: Neurosurgery   • LUMBAR LAMINECTOMY DISKECTOMY  11/29/2016    Procedure: LUMBAR LAMINECTOMY DISKECTOMY L3-5 open laminectomy ;  Surgeon: Bentley Claudio M.D.;  Location: SURGERY [facility];  Service:    • OTHER ORTHOPEDIC SURGERY  1990    right knee scope   • GROIN EXPLORATION      growth   • KNEE ARTHROSCOPY         ROS   All systems reviewed are negative except  "for HPI     Objective:     Blood pressure 128/80, pulse 77, temperature 36.3 °C (97.4 °F), temperature source Temporal, resp. rate 14, height 1.854 m (6' 1\"), weight 108.4 kg (239 lb), SpO2 95 %. Body mass index is 31.53 kg/m².   Physical Exam:  Constitutional: Alert, no distress.  Skin: Warm, dry, good turgor, no rashes in visible areas.  Eye: Equal, round and reactive, conjunctiva clear, lids normal.  ENMT: Lips without lesions, good dentition, oropharynx clear.  Neck: Trachea midline, no masses, no thyromegaly. No cervical or supraclavicular lymphadenopathy  Respiratory: Unlabored respiratory effort, lungs clear to auscultation, no wheezes, no ronchi.  Cardiovascular: Normal S1, S2, systolic 3/5 murmur, no edema.  Abdomen: Soft, non-tender, no masses, no hepatosplenomegaly.  Psych: Alert and oriented x3, normal affect and mood.        Assessment and Plan:   The following treatment plan was discussed    1. Essential hypertension  Continue lisinopril 20 mg daily.  Continue to monitor    2. Elevated LDL cholesterol level  Patient agreed to start Crestor 10 mg daily.  We will continue to monitor.  Discussed with patient that most common side effect is muscle aches.  Advised patient to follow-up with us if he starts experiencing any side effects.  Primary prevention.  - rosuvastatin (CRESTOR) 10 MG Tab; Take 1 Tab by mouth every evening.  Dispense: 90 Tab; Refill: 1    3. S/P lumbar laminectomy  4. Chronic right-sided low back pain without sciatica  Follow up with neurosurgery     5. Obesity (BMI 30-39.9)  Counseling for a small portion, balanced diet, exercising for3-5 times per week.    6. Prediabetes  Continue healthy lifestyle., continue to monitor       Followup: Return in about 6 months (around 8/21/2019), or if symptoms worsen or fail to improve, for Short.         "

## 2019-04-03 ENCOUNTER — HOSPITAL ENCOUNTER (OUTPATIENT)
Dept: RADIOLOGY | Facility: MEDICAL CENTER | Age: 74
End: 2019-04-03
Attending: CLINICAL NURSE SPECIALIST
Payer: MEDICARE

## 2019-04-03 DIAGNOSIS — M54.16 LUMBAR RADICULOPATHY: ICD-10-CM

## 2019-04-03 PROCEDURE — 72110 X-RAY EXAM L-2 SPINE 4/>VWS: CPT

## 2019-04-03 PROCEDURE — 72148 MRI LUMBAR SPINE W/O DYE: CPT

## 2019-05-22 DIAGNOSIS — I10 ESSENTIAL HYPERTENSION: ICD-10-CM

## 2019-05-22 NOTE — TELEPHONE ENCOUNTER
Was the patient seen in the last year in this department? Yes    Does patient have an active prescription for medications requested? No     Received Request Via: Pharmacy     Pt est care with Dr. Alvarez 7/15

## 2019-05-23 RX ORDER — LISINOPRIL 10 MG/1
TABLET ORAL
Qty: 90 TAB | Refills: 3 | Status: SHIPPED | OUTPATIENT
Start: 2019-05-23 | End: 2020-05-18

## 2019-05-30 ENCOUNTER — OFFICE VISIT (OUTPATIENT)
Dept: MEDICAL GROUP | Facility: PHYSICIAN GROUP | Age: 74
End: 2019-05-30
Payer: MEDICARE

## 2019-05-30 VITALS
SYSTOLIC BLOOD PRESSURE: 130 MMHG | RESPIRATION RATE: 14 BRPM | TEMPERATURE: 97.6 F | DIASTOLIC BLOOD PRESSURE: 78 MMHG | WEIGHT: 241 LBS | HEIGHT: 73 IN | OXYGEN SATURATION: 93 % | HEART RATE: 73 BPM | BODY MASS INDEX: 31.94 KG/M2

## 2019-05-30 DIAGNOSIS — R19.7 DIARRHEA OF PRESUMED INFECTIOUS ORIGIN: ICD-10-CM

## 2019-05-30 PROCEDURE — 99213 OFFICE O/P EST LOW 20 MIN: CPT | Performed by: NURSE PRACTITIONER

## 2019-05-30 ASSESSMENT — PAIN SCALES - GENERAL: PAINLEVEL: 4=SLIGHT-MODERATE PAIN

## 2019-05-30 NOTE — PROGRESS NOTES
Chief Complaint   Patient presents with   • Side Pain     R; x 2 weeks; px 4    • Diarrhea     x 2 weeks loose        HISTORY OF THE PRESENT ILLNESS: This is a 73 y.o. male patient of Dr. Ward who presents today for right side pain and loose diarrhea.    Patient states the diarrhea and right side pain began about 2 weeks ago with the diarrhea starting first. States he noticed his stomach gurgling after recently eating a rare hamburger with some wine. States he typically drinks 1.5 glasses of wine per day. Patient reports having history of back surgery and chronic back pain for which he will be getting an injection today. He states his current right side pain feels different. States it aches when he walks. The pain was worse yesterday but seems improved today. He reports occasional abdominal aching but otherwise no significant abdominal pain. He does have bloating and gas. States he realized he needs to be more careful with what he eats due to having a lot of gas. He reports having 2-3 bowel movements with soft stools per day. Patient states he does eat spicy food and pasta with cheese. He has been eating a lot of ice cream lately. He denies any mucus in stools, hematochezia, melena, nausea, vomiting, or dysuria. He is noted to have prior history of diverticulitis in 2006. Denies any recent fevers. Patient states he has been drinking plenty of water. He takes fiber in the morning, as well as probiotics. He mentions restarting cholesterol medication about two months ago but denies any side effects from the medication.    Past Medical History:   Diagnosis Date   • Depression     pre diabetes   • Diabetes (HCC)     prediabetic   • Heart burn    • Hepatitis A 2001   • High cholesterol    • Hypertension    • Prediabetes    • Sleep apnea     cpap in use   • Snoring        Past Surgical History:   Procedure Laterality Date   • LUMBAR LAMINECTOMY DISKECTOMY  9/4/2018    Procedure: LUMBAR LAMINECTOMY DISKECTOMY-  L2-3 OPEN  LAMI AND REDO L3-4 LAMI;  Surgeon: Bentley Claudio M.D.;  Location: SURGERY [facility];  Service: Neurosurgery   • LUMBAR LAMINECTOMY DISKECTOMY  2016    Procedure: LUMBAR LAMINECTOMY DISKECTOMY L3-5 open laminectomy ;  Surgeon: Bentley Claudio M.D.;  Location: SURGERY [facility];  Service:    • OTHER ORTHOPEDIC SURGERY      right knee scope   • GROIN EXPLORATION      growth   • KNEE ARTHROSCOPY         Family Status   Relation Status   • Mo    • Fa    • Sis Alive   • Sis Alive   • MGMo (Not Specified)   • PGFa (Not Specified)     Family History   Problem Relation Age of Onset   • Heart Disease Mother    • Cancer Mother         lung cancer   • Heart Disease Maternal Grandmother    • Diabetes Paternal Grandfather        Social History   Substance Use Topics   • Smoking status: Never Smoker   • Smokeless tobacco: Never Used   • Alcohol use 0.6 oz/week     1 Glasses of wine per week      Comment: 1/2 glass wine per day        Allergies: Advil [ibuprofen]    Current Outpatient Prescriptions Ordered in [facility]   Medication Sig Dispense Refill   • lisinopril (PRINIVIL) 10 MG Tab TAKE 1 TABLET BY MOUTH EVERY DAY 90 Tab 3   • rosuvastatin (CRESTOR) 10 MG Tab Take 1 Tab by mouth every evening. 90 Tab 1   • omeprazole (PRILOSEC) 20 MG delayed-release capsule TAKE 1 CAP BY MOUTH AS NEEDED (FOR HEARTBURN). 90 Cap 3     No current Epic-ordered facility-administered medications on file.        Review of Systems   Constitutional: Negative for fever, chills, weight loss and malaise/fatigue.   Respiratory: Negative for cough, sputum production, shortness of breath and wheezing.    Cardiovascular: Negative for chest pain, palpitations, orthopnea and leg swelling.   Gastrointestinal: Abdominal aching, diarrhea, bloating, gas, stomach gurgling. Negative for mucus in stools, hematochezia, melena, nausea, vomiting.  Genitourinary: Negative for dysuria, urgency and frequency.   Musculoskeletal: Right side  "pain.  All other systems reviewed and are negative except as in HPI.    Exam: /78 (BP Location: Left arm, Patient Position: Sitting, BP Cuff Size: Adult)   Pulse 73   Temp 36.4 °C (97.6 °F) (Temporal)   Resp 14   Ht 1.854 m (6' 1\")   Wt 109.3 kg (241 lb)   SpO2 93%   General:  Normal appearing. No distress.  Pulmonary:  Clear to ausculation.  Normal effort. No rales, ronchi, or wheezing.  Cardiovascular:  Regular rate and rhythm without murmur. Carotid and radial pulses are intact and equal bilaterally.  Abdomen: Soft, nontender, nondistended. Liver and spleen are not palpable  Neurologic:  Grossly nonfocal  Skin:  Warm and dry.  No obvious lesions.  Musculoskeletal:  Normal gait. No extremity cyanosis, clubbing, or edema.  Psych:  Normal mood and affect. Alert and oriented x3. Judgment and insight is normal.    PLAN:    1. Diarrhea of presumed infectious origin  - Informed patient his physical exam today is overall unrevealing. Informed him the right side pain is likely from muscular pain. Will continue to monitor. Advised patient to increase use of probiotics. Recommended cutting out milk for now. Advised bland diet. Informed him if the pain is not resolved by next week, please contact our office.     Follow-up as needed. Patient is encouraged to be seen in the emergency room for chest pain, palpitations, shortness of breath, dizziness, severe abdominal pain or other concerning symptoms.     Please note that this dictation was created using voice recognition software. I have made every reasonable attempt to correct obvious errors, but I expect that there are errors of grammar and possibly content that I did not discover before finalizing the note.      Assessment/Plan  1. Diarrhea of presumed infectious origin           I have placed the below orders and discussed them with an approved delegating provider. The MA is performing the below orders under the direction of Dr. Lawton.       Matheus HARVEY " Jose (Scribe), am scribing for, and in the presence of, ALEXEI Flores    Electronically signed by: Matheus Garcia (Sonnyiblily), 5/30/2019    I, ALEXEI Flores personally performed the services described in this documentation, as scribed by Matheus Garcia in my presence, and it is both accurate and complete.

## 2019-06-24 RX ORDER — OMEPRAZOLE 20 MG/1
20 CAPSULE, DELAYED RELEASE ORAL PRN
Qty: 90 CAP | Refills: 0 | Status: SHIPPED | OUTPATIENT
Start: 2019-06-24 | End: 2019-09-20 | Stop reason: SDUPTHER

## 2019-06-26 ENCOUNTER — OFFICE VISIT (OUTPATIENT)
Dept: MEDICAL GROUP | Facility: MEDICAL CENTER | Age: 74
End: 2019-06-26
Payer: MEDICARE

## 2019-06-26 VITALS
TEMPERATURE: 97.5 F | BODY MASS INDEX: 31.64 KG/M2 | OXYGEN SATURATION: 95 % | HEIGHT: 73 IN | HEART RATE: 78 BPM | SYSTOLIC BLOOD PRESSURE: 120 MMHG | RESPIRATION RATE: 20 BRPM | DIASTOLIC BLOOD PRESSURE: 70 MMHG | WEIGHT: 238.76 LBS

## 2019-06-26 DIAGNOSIS — R10.31 RLQ ABDOMINAL PAIN: ICD-10-CM

## 2019-06-26 LAB
APPEARANCE UR: CLEAR
BILIRUB UR STRIP-MCNC: NEGATIVE MG/DL
COLOR UR AUTO: YELLOW
GLUCOSE UR STRIP.AUTO-MCNC: NEGATIVE MG/DL
KETONES UR STRIP.AUTO-MCNC: NEGATIVE MG/DL
LEUKOCYTE ESTERASE UR QL STRIP.AUTO: NEGATIVE
NITRITE UR QL STRIP.AUTO: NEGATIVE
PH UR STRIP.AUTO: 5.5 [PH] (ref 5–8)
PROT UR QL STRIP: NEGATIVE MG/DL
RBC UR QL AUTO: NORMAL
SP GR UR STRIP.AUTO: 1.02
UROBILINOGEN UR STRIP-MCNC: 0.2 MG/DL

## 2019-06-26 PROCEDURE — 99213 OFFICE O/P EST LOW 20 MIN: CPT | Performed by: PHYSICIAN ASSISTANT

## 2019-06-26 PROCEDURE — 81002 URINALYSIS NONAUTO W/O SCOPE: CPT | Performed by: PHYSICIAN ASSISTANT

## 2019-06-26 NOTE — PROGRESS NOTES
"Chief Complaint   Patient presents with   • RLQ Pain     5-6 days       HPI  Valeriy Orlando is a 73 y.o. male here today for new onset RLQ pain X 6 days. No NVC,   Had watery diarrhea 2 nights ago w/o any blood which has resolved, pain is constant  low grade dull achy pain 3/10, localized pain non radiating. States he has had 2 back surgery and is in pain all the time 10/10 and no pain compares to his back pain, No fever or chills.  No recent antibiotic use, no hospital stay, no drinking from unknown water resources.  Patient states he started his symptoms after having Chinese food.  No dysuria.      Past medical, surgical, family, and social history is reviewed in Epic chart by me today.   Medications and allergies reviewed and updated in Epic chart by me today.     ROS:   As documented in history of present illness above    Exam:  /70 (Patient Position: Sitting)   Pulse 78   Temp 36.4 °C (97.5 °F) (Temporal)   Resp 20   Ht 1.854 m (6' 1\")   Wt 108.3 kg (238 lb 12.1 oz)   SpO2 95%   Constitutional: Alert, no distress, plus 3 vital signs  Skin:  Warm, dry, no rashes invisible areas  Eye: Equal, round and reactive, conjunctiva clear  ENMT: Lips without lesions, good dentition, oropharynx clear    Neck: Trachea midline, no masses, no thyromegaly  Abdomen: Soft, no guarding, positive mild tenderness to palpation over right lower quadrant.    psych: Alert, pleasant, well-groomed, normal affect    A/P:    1. RLQ abdominal pain  Patient is afebrile, no nausea vomiting or constipation, had one night of diarrhea 2 nights ago which has resolved without any blood in his stool.  Pain is low-grade 3 out of 10.  No recent hospital stay or antibiotic use.  Possible gastroenteritis, patient is a stable for now to wait and watch if the symptoms resolve on its own.  Discussed might need to do abdominal CT scan.      - POCT Urinalysis --> neg nitrite and luek, trace of blood      F/u on Friday if symptoms fail to " resolve.    We discussed red flags incuding worsening pain, fever, NV, shortness of breath or overall decline in health. Patient verbalize understanding and will either call our office or present to the emergency room.

## 2019-06-28 ENCOUNTER — OFFICE VISIT (OUTPATIENT)
Dept: MEDICAL GROUP | Facility: MEDICAL CENTER | Age: 74
End: 2019-06-28
Payer: MEDICARE

## 2019-06-28 VITALS
DIASTOLIC BLOOD PRESSURE: 78 MMHG | SYSTOLIC BLOOD PRESSURE: 128 MMHG | BODY MASS INDEX: 31.54 KG/M2 | RESPIRATION RATE: 20 BRPM | HEIGHT: 73 IN | TEMPERATURE: 98.1 F | OXYGEN SATURATION: 93 % | HEART RATE: 76 BPM | WEIGHT: 238 LBS

## 2019-06-28 DIAGNOSIS — R10.9 RIGHT SIDED ABDOMINAL PAIN: ICD-10-CM

## 2019-06-28 PROCEDURE — 99213 OFFICE O/P EST LOW 20 MIN: CPT | Performed by: PHYSICIAN ASSISTANT

## 2019-06-28 NOTE — PROGRESS NOTES
Subjective:      Valeriy Orlando is a 73 y.o. male who presents with Abdominal Pain (follow up)        follow up right side abdominal pain    HPIPatient presents to follow up on right sided abdominal pain. About 2/3 out of 10, more discomfort than pain. Started about 8 days ago. Had a little diarrhea for a couple of days, still feels he can't eat normally. Slight nausea in the mornings. No blood in stool. No fever/chills. No urinary symptoms. Seen in our office 2 days ago with normal exam and normal urinalysis. History of fatty liver. Last colonoscopy 7/2018, few diverticula otherwise normal exam. Patient states that the abdominal pain may be radiating from his back. He is having significant back problems and pain s/p surgery and has had pain radiate around the front in the past. Working with pain management. Had some injections yesterday. Will have ablations coming up    ROSno fever/chills. 2-3 pound weight loss over 2 weeks. No neck pain or stiffness. No chest pain, SOB or difficulty breathing. No rash or new skin lesions. No new joint redness or swelling.    Active Ambulatory Problems     Diagnosis Date Noted   • Essential hypertension 05/03/2017   • Prediabetes 05/03/2017   • Gastroesophageal reflux disease without esophagitis 05/03/2017   • Obesity (BMI 30-39.9) 05/03/2017   • Abnormal stress test 05/03/2017   • MAVERICK (obstructive sleep apnea) 05/03/2017   • Nonrheumatic aortic valve insufficiency 09/13/2017   • Elevated LDL cholesterol level 05/09/2018   • Chronic right-sided low back pain without sciatica 07/06/2018   • S/P lumbar laminectomy 09/12/2018   • Stage 3 chronic kidney disease (HCC) 02/21/2019   • Diarrhea of presumed infectious origin 05/30/2019     Resolved Ambulatory Problems     Diagnosis Date Noted   • Degeneration of lumbar intervertebral disc 11/29/2016   • JIHAN (acute kidney injury) ([facility]) 05/03/2017   • Abdominal bloating 06/06/2017   • Abnormal EKG 06/06/2017   • Gastroenteritis 06/23/2017  "  • Flank pain 09/13/2017   • Liver lesion 12/13/2017   • Diverticulitis of colon 05/31/2006     Past Medical History:   Diagnosis Date   • Depression    • Diabetes (HCC)    • Heart burn    • Hepatitis A 2001   • High cholesterol    • Hypertension    • Prediabetes    • Sleep apnea    • Snoring      Current Outpatient Prescriptions   Medication Sig Dispense Refill   • Diclofenac Sodium 1 % Gel      • omeprazole (PRILOSEC) 20 MG delayed-release capsule Take 1 Cap by mouth as needed (For heartburn). 90 Cap 0   • lisinopril (PRINIVIL) 10 MG Tab TAKE 1 TABLET BY MOUTH EVERY DAY 90 Tab 3   • rosuvastatin (CRESTOR) 10 MG Tab Take 1 Tab by mouth every evening. 90 Tab 1     No current facility-administered medications for this visit.    allergy to advil  Non-smoker   Objective:     /78 (BP Location: Right arm, Patient Position: Sitting)   Pulse 76   Temp 36.7 °C (98.1 °F) (Temporal)   Resp 20   Ht 1.854 m (6' 1\")   Wt 108 kg (238 lb)   SpO2 93%   BMI 31.40 kg/m²      Physical Exam   Constitutional: He is oriented to person, place, and time. He appears well-developed and well-nourished. No distress.   Cardiovascular: Normal rate, regular rhythm and normal heart sounds.    Pulmonary/Chest: Effort normal and breath sounds normal.   Abdominal: Soft. He exhibits no mass. There is no tenderness. There is no guarding.   Neurological: He is alert and oriented to person, place, and time.   Skin: Skin is warm and dry. No rash noted. He is not diaphoretic. No pallor.   Psychiatric: He has a normal mood and affect. His behavior is normal. Judgment and thought content normal.   Vitals reviewed.              Assessment/Plan:     1. Right sided abdominal pain  Unlikely infection, has appt to f/u with Dr. Alvarez on 7/14, will call if symptoms worsen before that time      "

## 2019-07-10 ENCOUNTER — HOSPITAL ENCOUNTER (OUTPATIENT)
Facility: MEDICAL CENTER | Age: 74
End: 2019-07-10
Attending: PHYSICIAN ASSISTANT
Payer: MEDICARE

## 2019-07-10 ENCOUNTER — OFFICE VISIT (OUTPATIENT)
Dept: URGENT CARE | Facility: CLINIC | Age: 74
End: 2019-07-10
Payer: MEDICARE

## 2019-07-10 VITALS
DIASTOLIC BLOOD PRESSURE: 80 MMHG | HEART RATE: 86 BPM | BODY MASS INDEX: 34.5 KG/M2 | SYSTOLIC BLOOD PRESSURE: 128 MMHG | OXYGEN SATURATION: 96 % | WEIGHT: 241 LBS | RESPIRATION RATE: 20 BRPM | TEMPERATURE: 98.8 F | HEIGHT: 70 IN

## 2019-07-10 DIAGNOSIS — L03.114 CELLULITIS OF LEFT UPPER EXTREMITY: ICD-10-CM

## 2019-07-10 DIAGNOSIS — L02.91 ABSCESS: ICD-10-CM

## 2019-07-10 PROCEDURE — 87070 CULTURE OTHR SPECIMN AEROBIC: CPT

## 2019-07-10 PROCEDURE — 87186 SC STD MICRODIL/AGAR DIL: CPT

## 2019-07-10 PROCEDURE — 87077 CULTURE AEROBIC IDENTIFY: CPT

## 2019-07-10 PROCEDURE — 10060 I&D ABSCESS SIMPLE/SINGLE: CPT | Performed by: PHYSICIAN ASSISTANT

## 2019-07-10 PROCEDURE — 99000 SPECIMEN HANDLING OFFICE-LAB: CPT | Performed by: PHYSICIAN ASSISTANT

## 2019-07-10 PROCEDURE — 87205 SMEAR GRAM STAIN: CPT

## 2019-07-10 PROCEDURE — 87075 CULTR BACTERIA EXCEPT BLOOD: CPT

## 2019-07-10 RX ORDER — DOXYCYCLINE HYCLATE 100 MG
100 TABLET ORAL 2 TIMES DAILY
Qty: 20 TAB | Refills: 0 | Status: SHIPPED | OUTPATIENT
Start: 2019-07-10 | End: 2019-07-20

## 2019-07-10 ASSESSMENT — ENCOUNTER SYMPTOMS
COUGH: 0
SORE THROAT: 0
FEVER: 0
NAUSEA: 0
SINUS PAIN: 0
EYE PAIN: 0
VOMITING: 0
CHILLS: 0
SHORTNESS OF BREATH: 0
DIZZINESS: 0
BACK PAIN: 1
HEADACHES: 0

## 2019-07-10 NOTE — PROGRESS NOTES
"Subjective:      Valeriy Orlando is a 73 y.o. male who presents with Elbow Swelling (x1week, left arm swelling around elbow area, painful, itchiness)          Patient c/o warm, redness and swelling of left upper extremity onset x 1.5 weeks ago. States he has been working in his yard and got stuck with a throne from a bush. 1 week ago patient states some draining from wound. Pain worse with touching and bending, rated as a 3/10 and does not radiate. No history of prior injuries.       Review of Systems   Constitutional: Negative for chills and fever.   HENT: Negative for congestion, sinus pain and sore throat.    Eyes: Negative for pain.   Respiratory: Negative for cough and shortness of breath.    Cardiovascular: Negative for chest pain.   Gastrointestinal: Negative for nausea and vomiting.   Genitourinary: Negative for dysuria.   Musculoskeletal: Positive for back pain.   Skin: Negative for rash.   Neurological: Negative for dizziness and headaches.   Endo/Heme/Allergies: Negative for environmental allergies.          Objective:     /80 (BP Location: Left arm, Patient Position: Sitting, BP Cuff Size: Adult)   Pulse 86   Temp 37.1 °C (98.8 °F) (Temporal)   Resp 20   Ht 1.79 m (5' 10.47\")   Wt 109.3 kg (241 lb)   SpO2 96%   BMI 34.12 kg/m²      Physical Exam   Constitutional: He is oriented to person, place, and time. He appears well-developed and well-nourished. No distress.   HENT:   Head: Normocephalic and atraumatic.   Mouth/Throat: Oropharynx is clear and moist.   Eyes: Conjunctivae and EOM are normal.   Neck: Normal range of motion. Neck supple.   Cardiovascular: Normal rate and regular rhythm.    Pulmonary/Chest: Effort normal. No respiratory distress.   Musculoskeletal: Normal range of motion.   Neurological: He is alert and oriented to person, place, and time.   Skin: Skin is dry. There is erythema.        3x3cm area of induration with surrounding erythema involving the left forearm. Minimal " fluctuance. +warmth and moderate TTP. No elbow joint involvement.    Psychiatric: He has a normal mood and affect. His behavior is normal.          Verbal consent was obtained for incision and drainage of left forearm abscess measuring 3x3cm. Area was cleaned and prepped with betadine solution. Using sterile technique 3 cc of 1% lidocaine with epi was injected around the area of the abscess.   An 11 blade was used to make a small incision over center of fluctuance. Minimal amount of blood was noted. No non-iodoform packing placed. Dressing applied. Wound care instructions given. Patient tolerated procedure well.    Assessment/Plan:     1. Cellulitis of left upper extremity    - doxycycline (VIBRAMYCIN) 100 MG Tab; Take 1 Tab by mouth 2 times a day for 10 days.  Dispense: 20 Tab; Refill: 0  - ANAEROBIC/AEROBIC/GRAM STAIN    2. Abscess    I&D performed in office. Wound culture sent, results pending.       PT should follow up with PCP in 1-2 days for re-evaluation if symptoms have not improved.  Discussed red flags and reasons to return to UC or ED including worsening pain, redness, and swelling or any development of fever. Area of erythema marked with pen prior to discharge, patient given instructions to return if redness continues to spread. Pt verbalized understanding of diagnosis and follow up instructions and was offered informational handout on diagnosis.  PT discharged.

## 2019-07-11 ENCOUNTER — OFFICE VISIT (OUTPATIENT)
Dept: URGENT CARE | Facility: CLINIC | Age: 74
End: 2019-07-11
Payer: MEDICARE

## 2019-07-11 VITALS
TEMPERATURE: 98.2 F | BODY MASS INDEX: 34.65 KG/M2 | HEART RATE: 84 BPM | OXYGEN SATURATION: 95 % | SYSTOLIC BLOOD PRESSURE: 124 MMHG | RESPIRATION RATE: 16 BRPM | DIASTOLIC BLOOD PRESSURE: 80 MMHG | HEIGHT: 70 IN | WEIGHT: 242 LBS

## 2019-07-11 DIAGNOSIS — L03.114 CELLULITIS OF LEFT UPPER EXTREMITY: ICD-10-CM

## 2019-07-11 LAB
GRAM STN SPEC: NORMAL
SIGNIFICANT IND 70042: NORMAL
SITE SITE: NORMAL
SOURCE SOURCE: NORMAL

## 2019-07-11 PROCEDURE — 99024 POSTOP FOLLOW-UP VISIT: CPT | Performed by: FAMILY MEDICINE

## 2019-07-11 ASSESSMENT — ENCOUNTER SYMPTOMS
MYALGIAS: 0
SENSORY CHANGE: 0
FOCAL WEAKNESS: 0

## 2019-07-11 NOTE — PROGRESS NOTES
"Subjective:      Valeriy Orlando is a 73 y.o. male who presents with Hand Swelling (x today, Rt. hand swelling  \"Pt. was seen yesterday for celulitis onf Lt. Upper exremity:)            Seen yesterday and diagnosed with left upper extremity cellulitis.  He notices slightly worse swelling in his left hand.  Redness has not expanded past inked outline.  No red streaking.  No fever.  He is tolerating antibiotics and has had 2 doses of doxycycline.        Review of Systems   Musculoskeletal: Negative for joint pain and myalgias.   Skin: Positive for itching. Negative for rash.   Neurological: Negative for sensory change and focal weakness.          Objective:     /80 (BP Location: Right arm, Patient Position: Sitting, BP Cuff Size: Large adult)   Pulse 84   Temp 36.8 °C (98.2 °F) (Temporal)   Resp 16   Ht 1.79 m (5' 10.47\")   Wt 109.8 kg (242 lb)   SpO2 95%   BMI 34.26 kg/m²      Physical Exam   Constitutional: He appears well-developed and well-nourished. No distress.   HENT:   Head: Normocephalic and atraumatic.   Skin: Skin is dry.   Left upper extremity: Outlined red and warm skin extending from site of foreign body and small incision and drainage yesterday.  There is no fluctuance.  There is no epitrochlear or axillary adenopathy.  Mild swelling left hand.               Assessment/Plan:     1. Cellulitis of left upper extremity       Differential diagnosis, natural history, supportive care, and indications for immediate follow-up discussed at length.     No change in treatment at this time.  Recommend elevating left upper extremity.  Initial preliminary culture shows staph aureus.  "

## 2019-07-12 ENCOUNTER — TELEPHONE (OUTPATIENT)
Dept: URGENT CARE | Facility: CLINIC | Age: 74
End: 2019-07-12

## 2019-07-12 LAB
BACTERIA WND AEROBE CULT: ABNORMAL
BACTERIA WND AEROBE CULT: ABNORMAL
GRAM STN SPEC: ABNORMAL
SIGNIFICANT IND 70042: ABNORMAL
SITE SITE: ABNORMAL
SOURCE SOURCE: ABNORMAL

## 2019-07-13 LAB
BACTERIA SPEC ANAEROBE CULT: NORMAL
SIGNIFICANT IND 70042: NORMAL
SITE SITE: NORMAL
SOURCE SOURCE: NORMAL

## 2019-07-15 ENCOUNTER — OFFICE VISIT (OUTPATIENT)
Dept: MEDICAL GROUP | Facility: MEDICAL CENTER | Age: 74
End: 2019-07-15
Payer: MEDICARE

## 2019-07-15 VITALS
WEIGHT: 242.51 LBS | BODY MASS INDEX: 34.72 KG/M2 | DIASTOLIC BLOOD PRESSURE: 74 MMHG | OXYGEN SATURATION: 94 % | HEIGHT: 70 IN | SYSTOLIC BLOOD PRESSURE: 118 MMHG | HEART RATE: 65 BPM | TEMPERATURE: 98.3 F

## 2019-07-15 DIAGNOSIS — K21.9 GASTROESOPHAGEAL REFLUX DISEASE WITHOUT ESOPHAGITIS: ICD-10-CM

## 2019-07-15 DIAGNOSIS — E78.00 ELEVATED LDL CHOLESTEROL LEVEL: ICD-10-CM

## 2019-07-15 DIAGNOSIS — Z23 NEED FOR VACCINATION: ICD-10-CM

## 2019-07-15 DIAGNOSIS — K22.70 BARRETT'S ESOPHAGUS WITHOUT DYSPLASIA: ICD-10-CM

## 2019-07-15 DIAGNOSIS — R73.03 PREDIABETES: ICD-10-CM

## 2019-07-15 DIAGNOSIS — G47.33 OSA (OBSTRUCTIVE SLEEP APNEA): ICD-10-CM

## 2019-07-15 DIAGNOSIS — L03.114 CELLULITIS OF LEFT UPPER EXTREMITY: ICD-10-CM

## 2019-07-15 DIAGNOSIS — I10 ESSENTIAL HYPERTENSION: ICD-10-CM

## 2019-07-15 PROCEDURE — 90732 PPSV23 VACC 2 YRS+ SUBQ/IM: CPT | Performed by: FAMILY MEDICINE

## 2019-07-15 PROCEDURE — G0009 ADMIN PNEUMOCOCCAL VACCINE: HCPCS | Performed by: FAMILY MEDICINE

## 2019-07-15 PROCEDURE — 99214 OFFICE O/P EST MOD 30 MIN: CPT | Mod: 25 | Performed by: FAMILY MEDICINE

## 2019-07-15 NOTE — ASSESSMENT & PLAN NOTE
Currently on daily PPI. Last endoscopy was 1 yr ago. + King. Gets endoscopy every few years. Seen by [facility].

## 2019-07-15 NOTE — PROGRESS NOTES
Subjective:     CC: Diagnoses of Cellulitis of left upper extremity, Essential hypertension, Elevated LDL cholesterol level, Gastroesophageal reflux disease without esophagitis, Kign's esophagus without dysplasia, MAVERICK (obstructive sleep apnea), and Prediabetes were pertinent to this visit.    HPI: Patient is a 73 y.o. male established patient who presents today to [facility] care.  The patient was seen by  previously.      Cellulitis of left upper extremity  Chronic problem.  S/p I&D on 7/10/2019. Still on doxy. Pain has resolved. Continues to have swelling.     Essential hypertension  Chronic problem.  Currently on lisinopril. BP is well controlled.     Elevated LDL cholesterol level  Chronic problem.  Currently on crestor 10mg. Denies side effects.  Last lipid panel 1/23/2019.    Gastroesophageal reflux disease without esophagitis  Chronic problem.  Currently on daily PPI. Last endoscopy was 1 yr ago. + King esophagus. Gets endoscopy every few years. Seen by [facility].  Up-to-date on colonoscopy as well.    MAVERICK (obstructive sleep apnea)  Chronic problem. Compliant with Cpap. Sees Dr. Mccarthy.       Past Medical History:   Diagnosis Date   • Depression     pre diabetes   • Diabetes (HCC)     prediabetic   • Heart burn    • Hepatitis A 2001   • High cholesterol    • Hypertension    • Prediabetes    • Sleep apnea     cpap in use   • Snoring        Social History   Substance Use Topics   • Smoking status: Never Smoker   • Smokeless tobacco: Never Used   • Alcohol use 0.6 oz/week     1 Glasses of wine per week      Comment: 1/2 glass wine per day        Current Outpatient Prescriptions Ordered in [facility]   Medication Sig Dispense Refill   • doxycycline (VIBRAMYCIN) 100 MG Tab Take 1 Tab by mouth 2 times a day for 10 days. 20 Tab 0   • Diclofenac Sodium 1 % Gel      • omeprazole (PRILOSEC) 20 MG delayed-release capsule Take 1 Cap by mouth as needed (For heartburn). 90 Cap 0   • lisinopril (PRINIVIL) 10 MG Tab TAKE 1  "TABLET BY MOUTH EVERY DAY 90 Tab 3   • rosuvastatin (CRESTOR) 10 MG Tab Take 1 Tab by mouth every evening. 90 Tab 1     No current Epic-ordered facility-administered medications on file.        Allergies:  Advil [ibuprofen]    Health Maintenance: Completed    ROS:  Pulm: no sob, no cough  CV: no chest pain, no palpitations        Objective:       Exam:  /74 (BP Location: Right arm, Patient Position: Sitting, BP Cuff Size: Adult long)   Pulse 65   Temp 36.8 °C (98.3 °F) (Temporal)   Ht 1.79 m (5' 10.47\")   Wt 110 kg (242 lb 8.1 oz)   SpO2 94%   BMI 34.33 kg/m²  Body mass index is 34.33 kg/m².      General: Normal appearing. No distress.  HEAD: NCAT  EYES: conjunctiva clear, lids without ptosis, pupils equal  and reactive to light  EARS: ears normal shape and contour  MOUTH: oropharynx is without erythema, edema or exudates.   Neck: Supple without masses. Thyroid is not enlarged. Normal ROM  Pulmonary: Clear to ausculation.  Normal effort. No rales, ronchi, or wheezing.  Cardiovascular: Very distant heart sounds.  Regular rate and rhythm, no murmur. No LE edema  Neurologic: Grossly normal, no focal deficits  Lymph: No cervical or supraclavicular lymph nodes are palpable  Skin: Warm and dry.  No obvious lesions.  Musculoskeletal: Normal gait and station.   Extremities: Swelling of the left forearm, prior I&D site has significant lump still, however, not fluctuant, nontender, not painful, no redness.  Psych: Normal mood and affect. Alert and oriented x3. Judgment and insight is normal.      Assessment & Plan:     73 y.o. male with the following -     1. Cellulitis of left upper extremity  Chronic problem.  Status post I&D on 7/10/2019.  Still on doxycycline.  The patient continues to have some swelling and a mass where the I&D was done, however, no fluctuance, no redness, no tenderness.  Advised patient to continue monitoring this closely.  Culture was negative.    2. Essential hypertension  Chronic problem, " well-controlled on lisinopril.  Order the following labs.  - Comp Metabolic Panel; Future    3. Elevated LDL cholesterol level  Chronic problem, well-controlled.  Last lipid panel was done 1/23/2019.  Tolerating Crestor well.    4. Gastroesophageal reflux disease without esophagitis  Chronic problem.  Followed by [facility].  Endoscopy was done 1 year ago, has King's esophagus, this is being monitored regularly.    5. King's esophagus without dysplasia  Chronic problem, see above.    6. MAVERICK (obstructive sleep apnea)  Chronic problem, compliant with his CPAP.  Followed by Dr. Mccarthy (pulmonology).    7. Prediabetes  Chronic problem.  The patient tries to work on his diet and exercise.  Ordered the following labs.  - HEMOGLOBIN A1C; Future      Return in about 6 months (around 1/15/2020).    Please note that this dictation was created using voice recognition software. I have made every reasonable attempt to correct obvious errors, but I expect that there are errors of grammar and possibly content that I did not discover before finalizing the note.

## 2019-07-17 NOTE — TELEPHONE ENCOUNTER
Lab called with positive results. Anaerobic wound culture came back with MRSA  
Telephone encounter 7/17/19 with patient to discuss wound culture results positive for MRSA. Patient currently taking Doxycycline with improvement of symptoms. No change in current treatment plan, patient agrees and has no further questions regarding care.    
DISPLAY PLAN FREE TEXT
DISPLAY PLAN FREE TEXT

## 2019-07-19 ENCOUNTER — OFFICE VISIT (OUTPATIENT)
Dept: URGENT CARE | Facility: CLINIC | Age: 74
End: 2019-07-19
Payer: MEDICARE

## 2019-07-19 ENCOUNTER — HOSPITAL ENCOUNTER (OUTPATIENT)
Dept: LAB | Facility: MEDICAL CENTER | Age: 74
End: 2019-07-19
Attending: FAMILY MEDICINE
Payer: MEDICARE

## 2019-07-19 VITALS
TEMPERATURE: 98.4 F | HEIGHT: 72 IN | SYSTOLIC BLOOD PRESSURE: 110 MMHG | BODY MASS INDEX: 32.78 KG/M2 | WEIGHT: 242 LBS | RESPIRATION RATE: 16 BRPM | OXYGEN SATURATION: 94 % | DIASTOLIC BLOOD PRESSURE: 80 MMHG | HEART RATE: 68 BPM

## 2019-07-19 DIAGNOSIS — I10 ESSENTIAL HYPERTENSION: ICD-10-CM

## 2019-07-19 DIAGNOSIS — M25.422 SWELLING OF LEFT ELBOW: ICD-10-CM

## 2019-07-19 DIAGNOSIS — R73.03 PREDIABETES: ICD-10-CM

## 2019-07-19 LAB
ALBUMIN SERPL BCP-MCNC: 3.7 G/DL (ref 3.2–4.9)
ALBUMIN/GLOB SERPL: 1.2 G/DL
ALP SERPL-CCNC: 60 U/L (ref 30–99)
ALT SERPL-CCNC: 20 U/L (ref 2–50)
ANION GAP SERPL CALC-SCNC: 7 MMOL/L (ref 0–11.9)
AST SERPL-CCNC: 16 U/L (ref 12–45)
BILIRUB SERPL-MCNC: 0.5 MG/DL (ref 0.1–1.5)
BUN SERPL-MCNC: 29 MG/DL (ref 8–22)
CALCIUM SERPL-MCNC: 9.3 MG/DL (ref 8.5–10.5)
CHLORIDE SERPL-SCNC: 107 MMOL/L (ref 96–112)
CO2 SERPL-SCNC: 26 MMOL/L (ref 20–33)
CREAT SERPL-MCNC: 1.35 MG/DL (ref 0.5–1.4)
EST. AVERAGE GLUCOSE BLD GHB EST-MCNC: 146 MG/DL
FASTING STATUS PATIENT QL REPORTED: NORMAL
GLOBULIN SER CALC-MCNC: 3 G/DL (ref 1.9–3.5)
GLUCOSE SERPL-MCNC: 121 MG/DL (ref 65–99)
HBA1C MFR BLD: 6.7 % (ref 0–5.6)
POTASSIUM SERPL-SCNC: 4.3 MMOL/L (ref 3.6–5.5)
PROT SERPL-MCNC: 6.7 G/DL (ref 6–8.2)
SODIUM SERPL-SCNC: 140 MMOL/L (ref 135–145)

## 2019-07-19 PROCEDURE — 99212 OFFICE O/P EST SF 10 MIN: CPT | Performed by: PHYSICIAN ASSISTANT

## 2019-07-19 PROCEDURE — 36415 COLL VENOUS BLD VENIPUNCTURE: CPT

## 2019-07-19 PROCEDURE — 80053 COMPREHEN METABOLIC PANEL: CPT

## 2019-07-19 PROCEDURE — 83036 HEMOGLOBIN GLYCOSYLATED A1C: CPT | Mod: GA

## 2019-07-23 ASSESSMENT — ENCOUNTER SYMPTOMS
NEUROLOGICAL NEGATIVE: 1
CONSTITUTIONAL NEGATIVE: 1
CARDIOVASCULAR NEGATIVE: 1
ROS SKIN COMMENTS: SEE HPI
RESPIRATORY NEGATIVE: 1

## 2019-07-23 NOTE — PROGRESS NOTES
Subjective:      Valeriy Orlando is a 73 y.o. male who presents with Elbow Swelling (seen previous for same left elbow, swollen, hard and itchi)        HPI   Patient presents today for follow up to dx of left elbow swelling/infection.  He had attempted I&D and was placed on abx after swab was taken.   Swab came back positive for MRSA infection.  He notes overall the swelling has gone down quite a bit but went to donate plasma and the tech commented on how swollen it still seemed.  He notes it is not particularly tender and if anything has been more itchy as the swelling has gone down. No pain with moving the joint.   No fevers.   Tolerating the Doxy       Review of Systems   Constitutional: Negative.    Respiratory: Negative.    Cardiovascular: Negative.    Musculoskeletal:        SEE HPI   Skin:        SEE HPI   Neurological: Negative.        PMH:  has a past medical history of Depression; Diabetes (HCC); Heart burn; Hepatitis A (2001); High cholesterol; Hypertension; Prediabetes; Sleep apnea; and Snoring.  MEDS:   Current Outpatient Prescriptions:   •  omeprazole (PRILOSEC) 20 MG delayed-release capsule, Take 1 Cap by mouth as needed (For heartburn)., Disp: 90 Cap, Rfl: 0  •  lisinopril (PRINIVIL) 10 MG Tab, TAKE 1 TABLET BY MOUTH EVERY DAY, Disp: 90 Tab, Rfl: 3  •  rosuvastatin (CRESTOR) 10 MG Tab, Take 1 Tab by mouth every evening., Disp: 90 Tab, Rfl: 1  •  Diclofenac Sodium 1 % Gel, , Disp: , Rfl:   ALLERGIES:   Allergies   Allergen Reactions   • Advil [Ibuprofen]      PCP does not want him on this due to kidneys     SURGHX:   Past Surgical History:   Procedure Laterality Date   • LUMBAR LAMINECTOMY DISKECTOMY  9/4/2018    Procedure: LUMBAR LAMINECTOMY DISKECTOMY-  L2-3 OPEN LAMI AND REDO L3-4 LAMI;  Surgeon: Bentley Claudio M.D.;  Location: SURGERY [facility];  Service: Neurosurgery   • LUMBAR LAMINECTOMY DISKECTOMY  11/29/2016    Procedure: LUMBAR LAMINECTOMY DISKECTOMY L3-5 open laminectomy ;  Surgeon:  Bentley Claudio M.D.;  Location: SURGERY [facility];  Service:    • OTHER ORTHOPEDIC SURGERY  1990    right knee scope   • GROIN EXPLORATION      growth   • KNEE ARTHROSCOPY       SOCHX:  reports that he has never smoked. He has never used smokeless tobacco. He reports that he drinks about 0.6 oz of alcohol per week . He reports that he uses drugs.  FH: Family history was reviewed, no pertinent findings to report   Objective:     /80 (BP Location: Right arm, Patient Position: Sitting, BP Cuff Size: Large adult)   Pulse 68   Temp 36.9 °C (98.4 °F) (Temporal)   Resp 16   Ht 1.829 m (6')   Wt 109.8 kg (242 lb)   SpO2 94%   BMI 32.82 kg/m²      Physical Exam   Constitutional: He is oriented to person, place, and time. He appears well-developed and well-nourished. No distress.   HENT:   Head: Normocephalic and atraumatic.   Eyes: Conjunctivae and EOM are normal.   Neck: Normal range of motion. Neck supple.   Cardiovascular: Normal rate and regular rhythm.    Pulmonary/Chest: Effort normal and breath sounds normal.   Musculoskeletal:        Left elbow: He exhibits normal range of motion. Swelling: just distal to the joint (NOT over olecranon) is approx 4x 5 cm area of soft tissue swelling, distinct mass.  It is non tender, not warm. No radial head, no medial epicondyle, no lateral epicondyle and no olecranon process tenderness noted.        Arms:  Neurological: He is alert and oriented to person, place, and time.   Skin: Skin is warm and dry.   Psychiatric: He has a normal mood and affect. His behavior is normal.   Vitals reviewed.              Assessment/Plan:     1. Swelling of left elbow      -resolved cellulitis with residual swelling.  The swelling is discrete, non tender and does not appear to be consistent with an active abscess.   He feels it is going down however I advised him to follow it to resolution with his PCP   -monitor for new redness or return of pain or worsening swelling.              Supportive care, differential diagnoses, and indications for immediate follow-up discussed with patient.   Pathogenesis of diagnosis discussed including typical length and natural progression.   Instructed to return to clinic or nearest emergency department for any change in condition, further concerns, or worsening of symptoms.  Patient states understanding of the plan of care and discharge instructions.      Virginia Bennett P.A.-C.

## 2019-08-22 DIAGNOSIS — E78.00 ELEVATED LDL CHOLESTEROL LEVEL: ICD-10-CM

## 2019-08-22 RX ORDER — ROSUVASTATIN CALCIUM 10 MG/1
10 TABLET, COATED ORAL EVERY EVENING
Qty: 90 TAB | Refills: 1 | Status: SHIPPED | OUTPATIENT
Start: 2019-08-22 | End: 2020-02-16

## 2019-08-22 NOTE — TELEPHONE ENCOUNTER
Was the patient seen in the last year in this department? Yes    Does patient have an active prescription for medications requested? No     Received Request Via: Pharmacy     Future Appointments       Provider Department [place]    9/19/2019 9:15 AM PREADMIT 1 PREADMIT TESTS [facility]     1/16/2020 12:20 PM Hue Alvarez M.D. [facility]

## 2019-09-19 DIAGNOSIS — Z01.812 PRE-OPERATIVE LABORATORY EXAMINATION: ICD-10-CM

## 2019-09-19 DIAGNOSIS — Z01.810 PRE-OPERATIVE CARDIOVASCULAR EXAMINATION: ICD-10-CM

## 2019-09-19 LAB
ABO GROUP BLD: NORMAL
ANION GAP SERPL CALC-SCNC: 9 MMOL/L (ref 0–11.9)
APPEARANCE UR: CLEAR
APTT PPP: 29 SEC (ref 24.7–36)
BASOPHILS # BLD AUTO: 0.9 % (ref 0–1.8)
BASOPHILS # BLD: 0.05 K/UL (ref 0–0.12)
BILIRUB UR QL STRIP.AUTO: NEGATIVE
BLD GP AB SCN SERPL QL: NORMAL
BUN SERPL-MCNC: 30 MG/DL (ref 8–22)
CALCIUM SERPL-MCNC: 10 MG/DL (ref 8.5–10.5)
CHLORIDE SERPL-SCNC: 107 MMOL/L (ref 96–112)
CO2 SERPL-SCNC: 24 MMOL/L (ref 20–33)
COLOR UR: YELLOW
CREAT SERPL-MCNC: 1.33 MG/DL (ref 0.5–1.4)
EKG IMPRESSION: NORMAL
EOSINOPHIL # BLD AUTO: 0.4 K/UL (ref 0–0.51)
EOSINOPHIL NFR BLD: 6.9 % (ref 0–6.9)
ERYTHROCYTE [DISTWIDTH] IN BLOOD BY AUTOMATED COUNT: 51.3 FL (ref 35.9–50)
EST. AVERAGE GLUCOSE BLD GHB EST-MCNC: 123 MG/DL
GLUCOSE SERPL-MCNC: 113 MG/DL (ref 65–99)
GLUCOSE UR STRIP.AUTO-MCNC: NEGATIVE MG/DL
HBA1C MFR BLD: 5.9 % (ref 0–5.6)
HCT VFR BLD AUTO: 47.8 % (ref 42–52)
HGB BLD-MCNC: 15.6 G/DL (ref 14–18)
IMM GRANULOCYTES # BLD AUTO: 0.02 K/UL (ref 0–0.11)
IMM GRANULOCYTES NFR BLD AUTO: 0.3 % (ref 0–0.9)
INR PPP: 0.96 (ref 0.87–1.13)
KETONES UR STRIP.AUTO-MCNC: NEGATIVE MG/DL
LEUKOCYTE ESTERASE UR QL STRIP.AUTO: NEGATIVE
LYMPHOCYTES # BLD AUTO: 1.39 K/UL (ref 1–4.8)
LYMPHOCYTES NFR BLD: 23.8 % (ref 22–41)
MCH RBC QN AUTO: 31.7 PG (ref 27–33)
MCHC RBC AUTO-ENTMCNC: 32.6 G/DL (ref 33.7–35.3)
MCV RBC AUTO: 97.2 FL (ref 81.4–97.8)
MICRO URNS: NORMAL
MONOCYTES # BLD AUTO: 0.36 K/UL (ref 0–0.85)
MONOCYTES NFR BLD AUTO: 6.2 % (ref 0–13.4)
NEUTROPHILS # BLD AUTO: 3.61 K/UL (ref 1.82–7.42)
NEUTROPHILS NFR BLD: 61.9 % (ref 44–72)
NITRITE UR QL STRIP.AUTO: NEGATIVE
NRBC # BLD AUTO: 0 K/UL
NRBC BLD-RTO: 0 /100 WBC
PH UR STRIP.AUTO: 6 [PH] (ref 5–8)
PLATELET # BLD AUTO: 225 K/UL (ref 164–446)
PMV BLD AUTO: 9.3 FL (ref 9–12.9)
POTASSIUM SERPL-SCNC: 4.5 MMOL/L (ref 3.6–5.5)
PROT UR QL STRIP: NEGATIVE MG/DL
PROTHROMBIN TIME: 13 SEC (ref 12–14.6)
RBC # BLD AUTO: 4.92 M/UL (ref 4.7–6.1)
RBC UR QL AUTO: NEGATIVE
RH BLD: NORMAL
SODIUM SERPL-SCNC: 140 MMOL/L (ref 135–145)
SP GR UR STRIP.AUTO: 1.01
UROBILINOGEN UR STRIP.AUTO-MCNC: 0.2 MG/DL
WBC # BLD AUTO: 5.8 K/UL (ref 4.8–10.8)

## 2019-09-19 PROCEDURE — 86901 BLOOD TYPING SEROLOGIC RH(D): CPT

## 2019-09-19 PROCEDURE — 36415 COLL VENOUS BLD VENIPUNCTURE: CPT

## 2019-09-19 PROCEDURE — 85025 COMPLETE CBC W/AUTO DIFF WBC: CPT

## 2019-09-19 PROCEDURE — 85610 PROTHROMBIN TIME: CPT

## 2019-09-19 PROCEDURE — 85730 THROMBOPLASTIN TIME PARTIAL: CPT

## 2019-09-19 PROCEDURE — 80048 BASIC METABOLIC PNL TOTAL CA: CPT

## 2019-09-19 PROCEDURE — 83036 HEMOGLOBIN GLYCOSYLATED A1C: CPT

## 2019-09-19 PROCEDURE — 86850 RBC ANTIBODY SCREEN: CPT

## 2019-09-19 PROCEDURE — 86900 BLOOD TYPING SEROLOGIC ABO: CPT

## 2019-09-19 PROCEDURE — 81003 URINALYSIS AUTO W/O SCOPE: CPT

## 2019-09-19 PROCEDURE — 93005 ELECTROCARDIOGRAM TRACING: CPT | Performed by: NEUROLOGICAL SURGERY

## 2019-09-20 RX ORDER — OMEPRAZOLE 20 MG/1
CAPSULE, DELAYED RELEASE ORAL
Qty: 90 CAP | Refills: 3 | Status: ON HOLD | OUTPATIENT
Start: 2019-09-20 | End: 2019-10-03

## 2019-09-24 ENCOUNTER — OFFICE VISIT (OUTPATIENT)
Dept: MEDICAL GROUP | Facility: MEDICAL CENTER | Age: 74
End: 2019-09-24
Payer: MEDICARE

## 2019-09-24 VITALS
WEIGHT: 235.67 LBS | DIASTOLIC BLOOD PRESSURE: 70 MMHG | SYSTOLIC BLOOD PRESSURE: 102 MMHG | OXYGEN SATURATION: 91 % | HEIGHT: 72 IN | TEMPERATURE: 98.4 F | HEART RATE: 67 BPM | BODY MASS INDEX: 31.92 KG/M2

## 2019-09-24 DIAGNOSIS — R94.39 ABNORMAL STRESS TEST: ICD-10-CM

## 2019-09-24 DIAGNOSIS — M54.50 CHRONIC RIGHT-SIDED LOW BACK PAIN WITHOUT SCIATICA: ICD-10-CM

## 2019-09-24 DIAGNOSIS — G89.29 CHRONIC RIGHT-SIDED LOW BACK PAIN WITHOUT SCIATICA: ICD-10-CM

## 2019-09-24 DIAGNOSIS — Z23 NEED FOR VACCINATION: ICD-10-CM

## 2019-09-24 DIAGNOSIS — R94.31 ABNORMAL EKG: ICD-10-CM

## 2019-09-24 PROCEDURE — 99214 OFFICE O/P EST MOD 30 MIN: CPT | Mod: 25 | Performed by: FAMILY MEDICINE

## 2019-09-24 PROCEDURE — G0008 ADMIN INFLUENZA VIRUS VAC: HCPCS | Performed by: FAMILY MEDICINE

## 2019-09-24 PROCEDURE — 90662 IIV NO PRSV INCREASED AG IM: CPT | Performed by: FAMILY MEDICINE

## 2019-09-25 NOTE — ASSESSMENT & PLAN NOTE
New problem.  The patient will be undergoing spine surgery soon with Dr. Juares.  As such, a preop EKG was obtained.  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  This EKG was confirmed by Dr. Arcos.  The patient came in today concerned about this EKG.  However, there is been no changes since his last EKG in 2017.

## 2019-09-25 NOTE — PROGRESS NOTES
Subjective:     CC: Diagnoses of Abnormal EKG, Abnormal stress test, Chronic right-sided low back pain without sciatica, and Need for vaccination were pertinent to this visit.    HPI: Patient is a 73 y.o. male established patient who presents today with concern regarding a new abnormal EKG.      Abnormal EKG  New problem.  The patient will be undergoing spine surgery soon with Dr. Juares.  As such, a preop EKG was obtained.  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  This EKG was confirmed by Dr. Arcos.  The patient came in today concerned about this EKG.  However, there is been no changes since his last EKG in 2017.    Abnormal stress test  Chronic problem.  The patient had established with [facility] cardiology, however, he has not been seen by them for nearly 2 years.  It was advised that he follow-up in 3 months as he did have some ischemic changes on his stress test.    Chronic right-sided low back pain without sciatica  Chronic problem.  The patient is scheduled to have spine surgery on October 3, 2019.  He states other than his back pain he feels quite well.  He denies any chest pain or shortness of breath.  He is able to do yard work without any shortness of breath.      Past Medical History:   Diagnosis Date   • Depression     pre diabetes   • Diabetes (HCC)     prediabetic   • Heart burn     GERD   • Hepatitis A 2001   • High cholesterol    • Hypertension    • Prediabetes    • Sleep apnea     cpap in use   • Snoring        Social History     Tobacco Use   • Smoking status: Never Smoker   • Smokeless tobacco: Never Used   Substance Use Topics   • Alcohol use: Yes     Alcohol/week: 0.6 oz     Types: 1 Glasses of wine per week     Comment: 1/2 glass wine per day    • Drug use: Yes     Comment: Uses CBD        Current Outpatient Medications Ordered in Epic   Medication Sig Dispense Refill   • omeprazole (PRILOSEC) 20 MG delayed-release capsule TAKE 1 CAP BY MOUTH AS NEEDED DAILY FOR HEARTBURN 90 Cap 3    • rosuvastatin (CRESTOR) 10 MG Tab Take 1 Tab by mouth every evening. 90 Tab 1   • lisinopril (PRINIVIL) 10 MG Tab TAKE 1 TABLET BY MOUTH EVERY DAY 90 Tab 3   • Diclofenac Sodium 1 % Gel        No current Epic-ordered facility-administered medications on file.        Allergies:  Advil [ibuprofen]    Health Maintenance: Completed    ROS:  Pulm: no sob, no cough  CV: no chest pain, no palpitations      Objective:       Exam:  /70 (BP Location: Right arm, Patient Position: Sitting, BP Cuff Size: Large adult)   Pulse 67   Temp 36.9 °C (98.4 °F) (Temporal)   Ht 1.829 m (6')   Wt 106.9 kg (235 lb 10.8 oz)   SpO2 91%   BMI 31.96 kg/m²  Body mass index is 31.96 kg/m².      General: Normal appearing. No distress.  HEAD: NCAT  EYES: conjunctiva clear, lids without ptosis, pupils equal  and reactive to light  EARS: ears normal shape and contour  MOUTH: oropharynx is without erythema, edema or exudates.   Neck: Supple without masses. Thyroid is not enlarged. Normal ROM  Pulmonary: Clear to ausculation.  Normal effort. No rales, ronchi, or wheezing.  Cardiovascular: Regular rate and rhythm, no murmur. No LE edema  Neurologic: Grossly normal, no focal deficits  Lymph: No cervical or supraclavicular lymph nodes are palpable  Skin: Warm and dry.  No obvious lesions.  Musculoskeletal: Normal gait and station.   Psych: Normal mood and affect. Alert and oriented x3. Judgment and insight is normal.       Labs: 9/19/2019 results reviewed and discussed with the patient, questions answered.    Assessment & Plan:     73 y.o. male with the following -     1. Abnormal EKG  New problem.  The patient did have a new EKG, done for preop reasons.  It does show an old infarct, however, this is unchanged since 2017 when his last EKG was done.  I advised that his EKG is unchanged, however, he was supposed to follow-up with cardiology given that he did have some ischemic changes in his stress test.  The patient states he was unaware of  this.  However, he does report that he will schedule an appointment with them.    2. Abnormal stress test  Chronic problem, see above for plan.    3. Chronic right-sided low back pain without sciatica  Chronic problem, the patient is scheduled for spine surgery 10/3/2019.    4. Need for vaccination  - Influenza Vaccine, High Dose (65+ Only)    Return in about 3 months (around 12/24/2019).    Please note that this dictation was created using voice recognition software. I have made every reasonable attempt to correct obvious errors, but I expect that there are errors of grammar and possibly content that I did not discover before finalizing the note.

## 2019-09-25 NOTE — ASSESSMENT & PLAN NOTE
Chronic problem.  The patient had established with [facility], however, he has not been seen by them for nearly 2 years.  It was advised that he follow-up in 3 months as he did have some ischemic changes on his stress test.

## 2019-09-25 NOTE — ASSESSMENT & PLAN NOTE
Chronic problem.  The patient is scheduled to have spine surgery on October 3, 2019.  He states other than his back pain he feels quite well.  He denies any chest pain or shortness of breath.  He is able to do yard work without any shortness of breath.

## 2019-10-03 ENCOUNTER — ANESTHESIA EVENT (OUTPATIENT)
Dept: SURGERY | Facility: MEDICAL CENTER | Age: 74
DRG: 455 | End: 2019-10-03
Payer: MEDICARE

## 2019-10-03 ENCOUNTER — APPOINTMENT (OUTPATIENT)
Dept: RADIOLOGY | Facility: MEDICAL CENTER | Age: 74
DRG: 455 | End: 2019-10-03
Attending: NEUROLOGICAL SURGERY
Payer: MEDICARE

## 2019-10-03 ENCOUNTER — ANESTHESIA (OUTPATIENT)
Dept: SURGERY | Facility: MEDICAL CENTER | Age: 74
DRG: 455 | End: 2019-10-03
Payer: MEDICARE

## 2019-10-03 ENCOUNTER — HOSPITAL ENCOUNTER (INPATIENT)
Facility: MEDICAL CENTER | Age: 74
LOS: 3 days | DRG: 455 | End: 2019-10-06
Attending: NEUROLOGICAL SURGERY | Admitting: NEUROLOGICAL SURGERY
Payer: MEDICARE

## 2019-10-03 DIAGNOSIS — M48.062 LUMBAR STENOSIS WITH NEUROGENIC CLAUDICATION: ICD-10-CM

## 2019-10-03 LAB
ABO + RH BLD: NORMAL
GLUCOSE BLD-MCNC: 110 MG/DL (ref 65–99)

## 2019-10-03 PROCEDURE — 0SG0071 FUSION OF LUMBAR VERTEBRAL JOINT WITH AUTOLOGOUS TISSUE SUBSTITUTE, POSTERIOR APPROACH, POSTERIOR COLUMN, OPEN APPROACH: ICD-10-PCS | Performed by: NEUROLOGICAL SURGERY

## 2019-10-03 PROCEDURE — 160036 HCHG PACU - EA ADDL 30 MINS PHASE I: Performed by: NEUROLOGICAL SURGERY

## 2019-10-03 PROCEDURE — 700111 HCHG RX REV CODE 636 W/ 250 OVERRIDE (IP): Performed by: NURSE PRACTITIONER

## 2019-10-03 PROCEDURE — 306605 HCHG EXT PANELS 3.5 BACK SUPP

## 2019-10-03 PROCEDURE — 500885 HCHG PACK, JACKSON TABLE: Performed by: NEUROLOGICAL SURGERY

## 2019-10-03 PROCEDURE — 700101 HCHG RX REV CODE 250: Performed by: NURSE PRACTITIONER

## 2019-10-03 PROCEDURE — A9270 NON-COVERED ITEM OR SERVICE: HCPCS | Performed by: NURSE PRACTITIONER

## 2019-10-03 PROCEDURE — 700111 HCHG RX REV CODE 636 W/ 250 OVERRIDE (IP): Performed by: ANESTHESIOLOGY

## 2019-10-03 PROCEDURE — 0SG00AJ FUSION OF LUMBAR VERTEBRAL JOINT WITH INTERBODY FUSION DEVICE, POSTERIOR APPROACH, ANTERIOR COLUMN, OPEN APPROACH: ICD-10-PCS | Performed by: NEUROLOGICAL SURGERY

## 2019-10-03 PROCEDURE — 700102 HCHG RX REV CODE 250 W/ 637 OVERRIDE(OP): Performed by: ANESTHESIOLOGY

## 2019-10-03 PROCEDURE — 0SB20ZZ EXCISION OF LUMBAR VERTEBRAL DISC, OPEN APPROACH: ICD-10-PCS | Performed by: NEUROLOGICAL SURGERY

## 2019-10-03 PROCEDURE — 700112 HCHG RX REV CODE 229: Performed by: NURSE PRACTITIONER

## 2019-10-03 PROCEDURE — 110454 HCHG SHELL REV 250: Performed by: NEUROLOGICAL SURGERY

## 2019-10-03 PROCEDURE — 36415 COLL VENOUS BLD VENIPUNCTURE: CPT

## 2019-10-03 PROCEDURE — 770001 HCHG ROOM/CARE - MED/SURG/GYN PRIV*

## 2019-10-03 PROCEDURE — A6402 STERILE GAUZE <= 16 SQ IN: HCPCS | Performed by: NEUROLOGICAL SURGERY

## 2019-10-03 PROCEDURE — 82962 GLUCOSE BLOOD TEST: CPT

## 2019-10-03 PROCEDURE — 01NB0ZZ RELEASE LUMBAR NERVE, OPEN APPROACH: ICD-10-PCS | Performed by: NEUROLOGICAL SURGERY

## 2019-10-03 PROCEDURE — 700105 HCHG RX REV CODE 258: Performed by: NEUROLOGICAL SURGERY

## 2019-10-03 PROCEDURE — 500367 HCHG DRAIN KIT, HEMOVAC: Performed by: NEUROLOGICAL SURGERY

## 2019-10-03 PROCEDURE — C1821 INTERSPINOUS IMPLANT: HCPCS | Performed by: NEUROLOGICAL SURGERY

## 2019-10-03 PROCEDURE — A9270 NON-COVERED ITEM OR SERVICE: HCPCS | Performed by: ANESTHESIOLOGY

## 2019-10-03 PROCEDURE — 110371 HCHG SHELL REV 272: Performed by: NEUROLOGICAL SURGERY

## 2019-10-03 PROCEDURE — L0637 LSO SC R ANT/POS PNL PRE CST: HCPCS

## 2019-10-03 PROCEDURE — 160035 HCHG PACU - 1ST 60 MINS PHASE I: Performed by: NEUROLOGICAL SURGERY

## 2019-10-03 PROCEDURE — 72100 X-RAY EXAM L-S SPINE 2/3 VWS: CPT

## 2019-10-03 PROCEDURE — 160042 HCHG SURGERY MINUTES - EA ADDL 1 MIN LEVEL 5: Performed by: NEUROLOGICAL SURGERY

## 2019-10-03 PROCEDURE — 160031 HCHG SURGERY MINUTES - 1ST 30 MINS LEVEL 5: Performed by: NEUROLOGICAL SURGERY

## 2019-10-03 PROCEDURE — 700111 HCHG RX REV CODE 636 W/ 250 OVERRIDE (IP): Performed by: NEUROLOGICAL SURGERY

## 2019-10-03 PROCEDURE — 501838 HCHG SUTURE GENERAL: Performed by: NEUROLOGICAL SURGERY

## 2019-10-03 PROCEDURE — 700101 HCHG RX REV CODE 250: Performed by: ANESTHESIOLOGY

## 2019-10-03 PROCEDURE — C1713 ANCHOR/SCREW BN/BN,TIS/BN: HCPCS | Performed by: NEUROLOGICAL SURGERY

## 2019-10-03 PROCEDURE — 700101 HCHG RX REV CODE 250: Performed by: NEUROLOGICAL SURGERY

## 2019-10-03 PROCEDURE — 160002 HCHG RECOVERY MINUTES (STAT): Performed by: NEUROLOGICAL SURGERY

## 2019-10-03 PROCEDURE — 700105 HCHG RX REV CODE 258: Performed by: ANESTHESIOLOGY

## 2019-10-03 PROCEDURE — 160048 HCHG OR STATISTICAL LEVEL 1-5: Performed by: NEUROLOGICAL SURGERY

## 2019-10-03 PROCEDURE — A4314 CATH W/DRAINAGE 2-WAY LATEX: HCPCS | Performed by: NEUROLOGICAL SURGERY

## 2019-10-03 PROCEDURE — 160009 HCHG ANES TIME/MIN: Performed by: NEUROLOGICAL SURGERY

## 2019-10-03 PROCEDURE — 700102 HCHG RX REV CODE 250 W/ 637 OVERRIDE(OP): Performed by: NURSE PRACTITIONER

## 2019-10-03 DEVICE — MAGNIFUSE 1X5CM: Type: IMPLANTABLE DEVICE | Site: SPINE LUMBAR | Status: FUNCTIONAL

## 2019-10-03 DEVICE — SPACER RISE 10X22MM 8-14MM 10 DEGREES (3TCONX2=6): Type: IMPLANTABLE DEVICE | Site: SPINE LUMBAR | Status: FUNCTIONAL

## 2019-10-03 DEVICE — ROD PREBENT TITANIUM 5.5 X 40MM (2TCONX2=4): Type: IMPLANTABLE DEVICE | Site: SPINE LUMBAR | Status: FUNCTIONAL

## 2019-10-03 DEVICE — SCREW SOLERA SET SCREW (1TCX40+3TCX21+2TCX10=123): Type: IMPLANTABLE DEVICE | Site: SPINE LUMBAR | Status: FUNCTIONAL

## 2019-10-03 DEVICE — SCREW MAS  SOLERA 6.5 X 55MM (1TCX8+3TCX8=32): Type: IMPLANTABLE DEVICE | Site: SPINE LUMBAR | Status: FUNCTIONAL

## 2019-10-03 RX ORDER — KETAMINE HYDROCHLORIDE 50 MG/ML
INJECTION, SOLUTION INTRAMUSCULAR; INTRAVENOUS PRN
Status: DISCONTINUED | OUTPATIENT
Start: 2019-10-03 | End: 2019-10-03 | Stop reason: SURG

## 2019-10-03 RX ORDER — BACITRACIN 50000 [IU]/1
INJECTION, POWDER, FOR SOLUTION INTRAMUSCULAR
Status: DISCONTINUED | OUTPATIENT
Start: 2019-10-03 | End: 2019-10-03 | Stop reason: HOSPADM

## 2019-10-03 RX ORDER — ONDANSETRON 2 MG/ML
4 INJECTION INTRAMUSCULAR; INTRAVENOUS
Status: DISCONTINUED | OUTPATIENT
Start: 2019-10-03 | End: 2019-10-03 | Stop reason: HOSPADM

## 2019-10-03 RX ORDER — MORPHINE SULFATE 4 MG/ML
4 INJECTION, SOLUTION INTRAMUSCULAR; INTRAVENOUS
Status: DISCONTINUED | OUTPATIENT
Start: 2019-10-03 | End: 2019-10-03 | Stop reason: DRUGHIGH

## 2019-10-03 RX ORDER — HALOPERIDOL 5 MG/ML
1 INJECTION INTRAMUSCULAR
Status: DISCONTINUED | OUTPATIENT
Start: 2019-10-03 | End: 2019-10-03 | Stop reason: HOSPADM

## 2019-10-03 RX ORDER — AMOXICILLIN 250 MG
1 CAPSULE ORAL
Status: DISCONTINUED | OUTPATIENT
Start: 2019-10-03 | End: 2019-10-06 | Stop reason: HOSPADM

## 2019-10-03 RX ORDER — ONDANSETRON 2 MG/ML
INJECTION INTRAMUSCULAR; INTRAVENOUS PRN
Status: DISCONTINUED | OUTPATIENT
Start: 2019-10-03 | End: 2019-10-03 | Stop reason: SURG

## 2019-10-03 RX ORDER — MORPHINE SULFATE 4 MG/ML
2 INJECTION, SOLUTION INTRAMUSCULAR; INTRAVENOUS
Status: DISCONTINUED | OUTPATIENT
Start: 2019-10-03 | End: 2019-10-06 | Stop reason: HOSPADM

## 2019-10-03 RX ORDER — HYDROMORPHONE HYDROCHLORIDE 1 MG/ML
0.2 INJECTION, SOLUTION INTRAMUSCULAR; INTRAVENOUS; SUBCUTANEOUS
Status: DISCONTINUED | OUTPATIENT
Start: 2019-10-03 | End: 2019-10-03 | Stop reason: HOSPADM

## 2019-10-03 RX ORDER — LISINOPRIL 10 MG/1
10 TABLET ORAL
Status: DISCONTINUED | OUTPATIENT
Start: 2019-10-03 | End: 2019-10-06 | Stop reason: HOSPADM

## 2019-10-03 RX ORDER — ENEMA 19; 7 G/133ML; G/133ML
1 ENEMA RECTAL
Status: DISCONTINUED | OUTPATIENT
Start: 2019-10-03 | End: 2019-10-06 | Stop reason: HOSPADM

## 2019-10-03 RX ORDER — LABETALOL HYDROCHLORIDE 5 MG/ML
10 INJECTION, SOLUTION INTRAVENOUS
Status: DISCONTINUED | OUTPATIENT
Start: 2019-10-03 | End: 2019-10-06 | Stop reason: HOSPADM

## 2019-10-03 RX ORDER — VANCOMYCIN HYDROCHLORIDE 1 G/20ML
INJECTION, POWDER, LYOPHILIZED, FOR SOLUTION INTRAVENOUS
Status: COMPLETED | OUTPATIENT
Start: 2019-10-03 | End: 2019-10-03

## 2019-10-03 RX ORDER — TIZANIDINE 4 MG/1
4 TABLET ORAL 3 TIMES DAILY PRN
Status: DISCONTINUED | OUTPATIENT
Start: 2019-10-03 | End: 2019-10-06 | Stop reason: HOSPADM

## 2019-10-03 RX ORDER — HYDROMORPHONE HYDROCHLORIDE 1 MG/ML
0.1 INJECTION, SOLUTION INTRAMUSCULAR; INTRAVENOUS; SUBCUTANEOUS
Status: DISCONTINUED | OUTPATIENT
Start: 2019-10-03 | End: 2019-10-03 | Stop reason: HOSPADM

## 2019-10-03 RX ORDER — HYDRALAZINE HYDROCHLORIDE 20 MG/ML
10 INJECTION INTRAMUSCULAR; INTRAVENOUS
Status: DISCONTINUED | OUTPATIENT
Start: 2019-10-03 | End: 2019-10-06 | Stop reason: HOSPADM

## 2019-10-03 RX ORDER — ONDANSETRON 4 MG/1
4 TABLET, ORALLY DISINTEGRATING ORAL EVERY 4 HOURS PRN
Status: DISCONTINUED | OUTPATIENT
Start: 2019-10-03 | End: 2019-10-06 | Stop reason: HOSPADM

## 2019-10-03 RX ORDER — OXYCODONE HYDROCHLORIDE 5 MG/1
5 TABLET ORAL EVERY 4 HOURS PRN
Status: DISCONTINUED | OUTPATIENT
Start: 2019-10-03 | End: 2019-10-06 | Stop reason: HOSPADM

## 2019-10-03 RX ORDER — CEFAZOLIN SODIUM 2 G/100ML
2 INJECTION, SOLUTION INTRAVENOUS EVERY 8 HOURS
Status: DISCONTINUED | OUTPATIENT
Start: 2019-10-03 | End: 2019-10-06 | Stop reason: HOSPADM

## 2019-10-03 RX ORDER — OXYCODONE HCL 5 MG/5 ML
10 SOLUTION, ORAL ORAL
Status: COMPLETED | OUTPATIENT
Start: 2019-10-03 | End: 2019-10-03

## 2019-10-03 RX ORDER — MORPHINE SULFATE 4 MG/ML
4 INJECTION, SOLUTION INTRAMUSCULAR; INTRAVENOUS ONCE
Status: DISCONTINUED | OUTPATIENT
Start: 2019-10-03 | End: 2019-10-03 | Stop reason: DRUGHIGH

## 2019-10-03 RX ORDER — DOCUSATE SODIUM 100 MG/1
100 CAPSULE, LIQUID FILLED ORAL 2 TIMES DAILY
Status: DISCONTINUED | OUTPATIENT
Start: 2019-10-03 | End: 2019-10-06 | Stop reason: HOSPADM

## 2019-10-03 RX ORDER — OXYCODONE HYDROCHLORIDE 10 MG/1
10 TABLET ORAL EVERY 4 HOURS PRN
Status: DISCONTINUED | OUTPATIENT
Start: 2019-10-03 | End: 2019-10-06 | Stop reason: HOSPADM

## 2019-10-03 RX ORDER — SODIUM CHLORIDE, SODIUM LACTATE, POTASSIUM CHLORIDE, CALCIUM CHLORIDE 600; 310; 30; 20 MG/100ML; MG/100ML; MG/100ML; MG/100ML
INJECTION, SOLUTION INTRAVENOUS CONTINUOUS
Status: DISCONTINUED | OUTPATIENT
Start: 2019-10-03 | End: 2019-10-03

## 2019-10-03 RX ORDER — OMEPRAZOLE 20 MG/1
20 CAPSULE, DELAYED RELEASE ORAL
Status: DISCONTINUED | OUTPATIENT
Start: 2019-10-03 | End: 2019-10-06 | Stop reason: HOSPADM

## 2019-10-03 RX ORDER — ACETAMINOPHEN 500 MG
1000 TABLET ORAL ONCE
Status: COMPLETED | OUTPATIENT
Start: 2019-10-03 | End: 2019-10-03

## 2019-10-03 RX ORDER — LIDOCAINE HYDROCHLORIDE 40 MG/ML
SOLUTION TOPICAL PRN
Status: DISCONTINUED | OUTPATIENT
Start: 2019-10-03 | End: 2019-10-03 | Stop reason: SURG

## 2019-10-03 RX ORDER — DIPHENHYDRAMINE HYDROCHLORIDE 50 MG/ML
12.5 INJECTION INTRAMUSCULAR; INTRAVENOUS
Status: DISCONTINUED | OUTPATIENT
Start: 2019-10-03 | End: 2019-10-03 | Stop reason: HOSPADM

## 2019-10-03 RX ORDER — ONDANSETRON 2 MG/ML
4 INJECTION INTRAMUSCULAR; INTRAVENOUS EVERY 4 HOURS PRN
Status: DISCONTINUED | OUTPATIENT
Start: 2019-10-03 | End: 2019-10-06 | Stop reason: HOSPADM

## 2019-10-03 RX ORDER — CEFAZOLIN SODIUM 1 G/3ML
INJECTION, POWDER, FOR SOLUTION INTRAMUSCULAR; INTRAVENOUS PRN
Status: DISCONTINUED | OUTPATIENT
Start: 2019-10-03 | End: 2019-10-03 | Stop reason: SURG

## 2019-10-03 RX ORDER — DIPHENHYDRAMINE HYDROCHLORIDE 50 MG/ML
25 INJECTION INTRAMUSCULAR; INTRAVENOUS EVERY 6 HOURS PRN
Status: DISCONTINUED | OUTPATIENT
Start: 2019-10-03 | End: 2019-10-06 | Stop reason: HOSPADM

## 2019-10-03 RX ORDER — HYDRALAZINE HYDROCHLORIDE 20 MG/ML
5 INJECTION INTRAMUSCULAR; INTRAVENOUS
Status: DISCONTINUED | OUTPATIENT
Start: 2019-10-03 | End: 2019-10-03 | Stop reason: HOSPADM

## 2019-10-03 RX ORDER — DEXAMETHASONE SODIUM PHOSPHATE 4 MG/ML
INJECTION, SOLUTION INTRA-ARTICULAR; INTRALESIONAL; INTRAMUSCULAR; INTRAVENOUS; SOFT TISSUE PRN
Status: DISCONTINUED | OUTPATIENT
Start: 2019-10-03 | End: 2019-10-03 | Stop reason: SURG

## 2019-10-03 RX ORDER — DIPHENHYDRAMINE HCL 25 MG
25 TABLET ORAL EVERY 6 HOURS PRN
Status: DISCONTINUED | OUTPATIENT
Start: 2019-10-03 | End: 2019-10-06 | Stop reason: HOSPADM

## 2019-10-03 RX ORDER — AMOXICILLIN 250 MG
1 CAPSULE ORAL NIGHTLY
Status: DISCONTINUED | OUTPATIENT
Start: 2019-10-03 | End: 2019-10-06 | Stop reason: HOSPADM

## 2019-10-03 RX ORDER — ROSUVASTATIN CALCIUM 5 MG/1
10 TABLET, COATED ORAL EVERY EVENING
Status: DISCONTINUED | OUTPATIENT
Start: 2019-10-03 | End: 2019-10-06 | Stop reason: HOSPADM

## 2019-10-03 RX ORDER — SODIUM CHLORIDE AND POTASSIUM CHLORIDE 150; 900 MG/100ML; MG/100ML
INJECTION, SOLUTION INTRAVENOUS CONTINUOUS
Status: DISCONTINUED | OUTPATIENT
Start: 2019-10-03 | End: 2019-10-06 | Stop reason: HOSPADM

## 2019-10-03 RX ORDER — BISACODYL 10 MG
10 SUPPOSITORY, RECTAL RECTAL
Status: DISCONTINUED | OUTPATIENT
Start: 2019-10-03 | End: 2019-10-06 | Stop reason: HOSPADM

## 2019-10-03 RX ORDER — SODIUM CHLORIDE, SODIUM LACTATE, POTASSIUM CHLORIDE, CALCIUM CHLORIDE 600; 310; 30; 20 MG/100ML; MG/100ML; MG/100ML; MG/100ML
INJECTION, SOLUTION INTRAVENOUS CONTINUOUS
Status: DISCONTINUED | OUTPATIENT
Start: 2019-10-03 | End: 2019-10-03 | Stop reason: HOSPADM

## 2019-10-03 RX ORDER — POLYETHYLENE GLYCOL 3350 17 G/17G
1 POWDER, FOR SOLUTION ORAL 2 TIMES DAILY PRN
Status: DISCONTINUED | OUTPATIENT
Start: 2019-10-03 | End: 2019-10-06 | Stop reason: HOSPADM

## 2019-10-03 RX ORDER — LABETALOL HYDROCHLORIDE 5 MG/ML
5 INJECTION, SOLUTION INTRAVENOUS
Status: DISCONTINUED | OUTPATIENT
Start: 2019-10-03 | End: 2019-10-03 | Stop reason: HOSPADM

## 2019-10-03 RX ORDER — OXYCODONE HCL 5 MG/5 ML
5 SOLUTION, ORAL ORAL
Status: COMPLETED | OUTPATIENT
Start: 2019-10-03 | End: 2019-10-03

## 2019-10-03 RX ORDER — OMEPRAZOLE 20 MG/1
20 CAPSULE, DELAYED RELEASE ORAL
COMMUNITY
End: 2020-09-17

## 2019-10-03 RX ORDER — BUPIVACAINE HYDROCHLORIDE AND EPINEPHRINE 5; 5 MG/ML; UG/ML
INJECTION, SOLUTION EPIDURAL; INTRACAUDAL; PERINEURAL
Status: DISCONTINUED | OUTPATIENT
Start: 2019-10-03 | End: 2019-10-03 | Stop reason: HOSPADM

## 2019-10-03 RX ORDER — HYDROMORPHONE HYDROCHLORIDE 1 MG/ML
0.4 INJECTION, SOLUTION INTRAMUSCULAR; INTRAVENOUS; SUBCUTANEOUS
Status: DISCONTINUED | OUTPATIENT
Start: 2019-10-03 | End: 2019-10-03 | Stop reason: HOSPADM

## 2019-10-03 RX ADMIN — OXYCODONE HYDROCHLORIDE 5 MG: 5 TABLET ORAL at 19:37

## 2019-10-03 RX ADMIN — OMEPRAZOLE 20 MG: 20 CAPSULE, DELAYED RELEASE ORAL at 22:11

## 2019-10-03 RX ADMIN — FENTANYL CITRATE 50 MCG: 50 INJECTION INTRAMUSCULAR; INTRAVENOUS at 10:30

## 2019-10-03 RX ADMIN — ONDANSETRON 4 MG: 2 INJECTION INTRAMUSCULAR; INTRAVENOUS at 09:41

## 2019-10-03 RX ADMIN — LIDOCAINE HYDROCHLORIDE 4 ML: 40 SOLUTION TOPICAL at 07:15

## 2019-10-03 RX ADMIN — DOCUSATE SODIUM 100 MG: 100 CAPSULE, LIQUID FILLED ORAL at 18:54

## 2019-10-03 RX ADMIN — DIPHENHYDRAMINE HYDROCHLORIDE 12.5 MG: 50 INJECTION INTRAMUSCULAR; INTRAVENOUS at 11:19

## 2019-10-03 RX ADMIN — ROCURONIUM BROMIDE 100 MG: 10 INJECTION, SOLUTION INTRAVENOUS at 07:13

## 2019-10-03 RX ADMIN — FENTANYL CITRATE 50 MCG: 50 INJECTION, SOLUTION INTRAMUSCULAR; INTRAVENOUS at 07:13

## 2019-10-03 RX ADMIN — FENTANYL CITRATE 25 MCG: 50 INJECTION INTRAMUSCULAR; INTRAVENOUS at 11:43

## 2019-10-03 RX ADMIN — FENTANYL CITRATE 25 MCG: 50 INJECTION INTRAMUSCULAR; INTRAVENOUS at 11:08

## 2019-10-03 RX ADMIN — CEFAZOLIN SODIUM 2 G: 2 INJECTION, SOLUTION INTRAVENOUS at 15:53

## 2019-10-03 RX ADMIN — LIDOCAINE HYDROCHLORIDE 100 MG: 20 INJECTION, SOLUTION INTRAVENOUS at 07:13

## 2019-10-03 RX ADMIN — DEXAMETHASONE SODIUM PHOSPHATE 8 MG: 4 INJECTION, SOLUTION INTRA-ARTICULAR; INTRALESIONAL; INTRAMUSCULAR; INTRAVENOUS; SOFT TISSUE at 07:50

## 2019-10-03 RX ADMIN — OXYCODONE HYDROCHLORIDE 10 MG: 5 SOLUTION ORAL at 13:15

## 2019-10-03 RX ADMIN — HYDROMORPHONE HYDROCHLORIDE 0.4 MG: 1 INJECTION, SOLUTION INTRAMUSCULAR; INTRAVENOUS; SUBCUTANEOUS at 10:40

## 2019-10-03 RX ADMIN — PROPOFOL 20 MG: 10 INJECTION, EMULSION INTRAVENOUS at 10:00

## 2019-10-03 RX ADMIN — PROPOFOL 20 MG: 10 INJECTION, EMULSION INTRAVENOUS at 09:48

## 2019-10-03 RX ADMIN — SENNOSIDES, DOCUSATE SODIUM 1 TABLET: 50; 8.6 TABLET, FILM COATED ORAL at 22:11

## 2019-10-03 RX ADMIN — CEFAZOLIN SODIUM 2 G: 2 INJECTION, SOLUTION INTRAVENOUS at 22:12

## 2019-10-03 RX ADMIN — ROSUVASTATIN CALCIUM 10 MG: 5 TABLET, FILM COATED ORAL at 18:54

## 2019-10-03 RX ADMIN — TIZANIDINE 4 MG: 4 TABLET ORAL at 16:48

## 2019-10-03 RX ADMIN — PROPOFOL 10 MG: 10 INJECTION, EMULSION INTRAVENOUS at 09:55

## 2019-10-03 RX ADMIN — ROCURONIUM BROMIDE 30 MG: 10 INJECTION, SOLUTION INTRAVENOUS at 09:17

## 2019-10-03 RX ADMIN — KETAMINE HYDROCHLORIDE 50 MG: 50 INJECTION, SOLUTION INTRAMUSCULAR; INTRAVENOUS at 09:05

## 2019-10-03 RX ADMIN — FENTANYL CITRATE 25 MCG: 50 INJECTION INTRAMUSCULAR; INTRAVENOUS at 13:04

## 2019-10-03 RX ADMIN — POTASSIUM CHLORIDE AND SODIUM CHLORIDE: 900; 150 INJECTION, SOLUTION INTRAVENOUS at 15:53

## 2019-10-03 RX ADMIN — KETAMINE HYDROCHLORIDE 50 MG: 50 INJECTION, SOLUTION INTRAMUSCULAR; INTRAVENOUS at 07:13

## 2019-10-03 RX ADMIN — PROPOFOL 20 MG: 10 INJECTION, EMULSION INTRAVENOUS at 09:42

## 2019-10-03 RX ADMIN — PROPOFOL 150 MG: 10 INJECTION, EMULSION INTRAVENOUS at 07:13

## 2019-10-03 RX ADMIN — CEFAZOLIN 2 G: 330 INJECTION, POWDER, FOR SOLUTION INTRAMUSCULAR; INTRAVENOUS at 07:19

## 2019-10-03 RX ADMIN — SODIUM CHLORIDE, POTASSIUM CHLORIDE, SODIUM LACTATE AND CALCIUM CHLORIDE 1000 ML: 600; 310; 30; 20 INJECTION, SOLUTION INTRAVENOUS at 12:17

## 2019-10-03 RX ADMIN — HYDROMORPHONE HYDROCHLORIDE 0.4 MG: 1 INJECTION, SOLUTION INTRAMUSCULAR; INTRAVENOUS; SUBCUTANEOUS at 10:50

## 2019-10-03 RX ADMIN — THERA TABS 1 TABLET: TAB at 15:53

## 2019-10-03 RX ADMIN — FENTANYL CITRATE 50 MCG: 50 INJECTION, SOLUTION INTRAMUSCULAR; INTRAVENOUS at 09:44

## 2019-10-03 RX ADMIN — SODIUM CHLORIDE, POTASSIUM CHLORIDE, SODIUM LACTATE AND CALCIUM CHLORIDE: 600; 310; 30; 20 INJECTION, SOLUTION INTRAVENOUS at 06:11

## 2019-10-03 RX ADMIN — FENTANYL CITRATE 50 MCG: 50 INJECTION INTRAMUSCULAR; INTRAVENOUS at 10:25

## 2019-10-03 RX ADMIN — FENTANYL CITRATE 50 MCG: 50 INJECTION, SOLUTION INTRAMUSCULAR; INTRAVENOUS at 09:59

## 2019-10-03 RX ADMIN — ACETAMINOPHEN 1000 MG: 500 TABLET ORAL at 06:53

## 2019-10-03 RX ADMIN — HYDROMORPHONE HYDROCHLORIDE 0.2 MG: 1 INJECTION, SOLUTION INTRAMUSCULAR; INTRAVENOUS; SUBCUTANEOUS at 10:57

## 2019-10-03 RX ADMIN — FENTANYL CITRATE 50 MCG: 50 INJECTION, SOLUTION INTRAMUSCULAR; INTRAVENOUS at 07:37

## 2019-10-03 RX ADMIN — FENTANYL CITRATE 25 MCG: 50 INJECTION INTRAMUSCULAR; INTRAVENOUS at 13:01

## 2019-10-03 SDOH — HEALTH STABILITY: MENTAL HEALTH: HOW MANY STANDARD DRINKS CONTAINING ALCOHOL DO YOU HAVE ON A TYPICAL DAY?: 1 OR 2

## 2019-10-03 SDOH — HEALTH STABILITY: MENTAL HEALTH: HOW OFTEN DO YOU HAVE 6 OR MORE DRINKS ON ONE OCCASION?: NEVER

## 2019-10-03 SDOH — HEALTH STABILITY: MENTAL HEALTH: HOW OFTEN DO YOU HAVE A DRINK CONTAINING ALCOHOL?: 2-4 TIMES A MONTH

## 2019-10-03 ASSESSMENT — LIFESTYLE VARIABLES
CONSUMPTION TOTAL: NEGATIVE
TOTAL SCORE: 0
HOW MANY TIMES IN THE PAST YEAR HAVE YOU HAD 5 OR MORE DRINKS IN A DAY: 0
EVER_SMOKED: NEVER
ALCOHOL_USE: YES
TOTAL SCORE: 0
HAVE PEOPLE ANNOYED YOU BY CRITICIZING YOUR DRINKING: NO
HAVE YOU EVER FELT YOU SHOULD CUT DOWN ON YOUR DRINKING: NO
AVERAGE NUMBER OF DAYS PER WEEK YOU HAVE A DRINK CONTAINING ALCOHOL: 1
EVER FELT BAD OR GUILTY ABOUT YOUR DRINKING: NO
EVER HAD A DRINK FIRST THING IN THE MORNING TO STEADY YOUR NERVES TO GET RID OF A HANGOVER: NO
ON A TYPICAL DAY WHEN YOU DRINK ALCOHOL HOW MANY DRINKS DO YOU HAVE: 1
TOTAL SCORE: 0

## 2019-10-03 ASSESSMENT — PATIENT HEALTH QUESTIONNAIRE - PHQ9
2. FEELING DOWN, DEPRESSED, IRRITABLE, OR HOPELESS: NOT AT ALL
SUM OF ALL RESPONSES TO PHQ9 QUESTIONS 1 AND 2: 0
1. LITTLE INTEREST OR PLEASURE IN DOING THINGS: NOT AT ALL

## 2019-10-03 ASSESSMENT — COGNITIVE AND FUNCTIONAL STATUS - GENERAL
SUGGESTED CMS G CODE MODIFIER DAILY ACTIVITY: CI
CLIMB 3 TO 5 STEPS WITH RAILING: A LITTLE
DRESSING REGULAR LOWER BODY CLOTHING: A LITTLE
WALKING IN HOSPITAL ROOM: A LITTLE
DAILY ACTIVITIY SCORE: 23
SUGGESTED CMS G CODE MODIFIER MOBILITY: CJ
MOBILITY SCORE: 22

## 2019-10-03 ASSESSMENT — PAIN SCALES - GENERAL: PAIN_LEVEL: 3

## 2019-10-03 NOTE — PROGRESS NOTES
Pre-op complete. Unable to draw from IV start, lab called to draw 2nd ABO. Patient and family updated on plan of care. All questions answered at this time.  Call light within reach, advised to call for any questions or concerns. Hourly rounding in place.

## 2019-10-03 NOTE — ANESTHESIA PROCEDURE NOTES
Airway  Date/Time: 10/3/2019 7:15 AM  Performed by: Carlos Giang M.D.  Authorized by: Carlos Giang M.D.     Location:  OR  Urgency:  Elective  Indications for Airway Management:  Anesthesia  Spontaneous Ventilation: absent    Sedation Level:  Deep  Preoxygenated: Yes    Patient Position:  Sniffing  Mask Difficulty Assessment:  1 - vent by mask  Final Airway Type:  Endotracheal airway  Final Endotracheal Airway:  ETT  Cuffed: Yes    Technique Used for Successful ETT Placement:  Direct laryngoscopy  Insertion Site:  Oral  Blade Type:  Yolanda  Laryngoscope Blade/Videolaryngoscope Blade Size:  3  ETT Size (mm):  7.5  Measured from:  Lips  ETT to Lips (cm):  23  Placement Verified by: auscultation and capnometry    Cormack-Lehane Classification:  Grade IIb - view of arytenoids or posterior of glottis only  Number of Attempts at Approach:  1  Number of Other Approaches Attempted:  0

## 2019-10-03 NOTE — ANESTHESIA TIME REPORT
Anesthesia Start and Stop Event Times     Date Time Event    10/3/2019 0650 Ready for Procedure     0709 Anesthesia Start     1012 Anesthesia Stop        Responsible Staff  10/03/19    Name Role Begin End    Carlos Giang M.D. Anesth 0709 1012        Preop Diagnosis (Free Text):  Pre-op Diagnosis     LUMBAR SPONDYLOLISTHESIS        Preop Diagnosis (Codes):    Post op Diagnosis  Spondylolisthesis of lumbar region      Premium Reason  Non-Premium    Comments:

## 2019-10-03 NOTE — OR SURGEON
Immediate Post OP Note    PreOp Diagnosis: lumbar stenosis/ spondylolisthesis   PostOp Diagnosis:  lumbar stenosis/ spondylolisthesis     Procedure(s):  FUSION, SPINE, LUMBAR, PLIF- REDO L4-5 WITH TLIF - Wound Class: Clean with Drain  LAMINECTOMY, SPINE, LUMBAR, WITH DISCECTOMY - Wound Class: Clean with Drain    Surgeon(s):  Kyler Juares M.D.    Anesthesiologist/Type of Anesthesia:  Anesthesiologist: Carlos Giang M.D./General    Surgical Staff:  Assistant: KATHY Urena  Circulator: Heather C Cogan, R.N.; Namita Medina R.N.  Scrub Person: Virginia Willett  Radiology Technologist: Jenny Menchaca    Specimens removed if any:  * No specimens in log *    Estimated Blood Loss: 100 cc    Findings: good decompression, good hardware placement     Complications: none         10/3/2019 10:10 AM KATHY Urena

## 2019-10-03 NOTE — ANESTHESIA QCDR
2019 [facility] Clinical Data Registry (for Quality Improvement)     Postoperative nausea/vomiting risk protocol (Adult = 18 yrs and Pediatric 3-17 yrs)- (430 and 463)  General inhalation anesthetic (NOT TIVA) with PONV risk factors: Yes  Provision of anti-emetic therapy with at least 2 different classes of agents: Yes   Patient DID NOT receive anti-emetic therapy and reason is documented in Medical Record:  N/A    Multimodal Pain Management- (AQI59)  Patient undergoing Elective Surgery (i.e. Outpatient, or ASC, or Prescheduled Surgery prior to Hospital Admission): Yes  Use of Multimodal Pain Management, two or more drugs and/or interventions, NOT including systemic opioids: Yes   Exception: Documented allergy to multiple classes of analgesics:  N/A    PACU assessment of acute postoperative pain prior to Anesthesia Care End- Applies to Patients Age = 18- (ABG7)  Initial PACU pain score is which of the following: < 7/10  Patient unable to report pain score: N/A    Post-anesthetic transfer of care checklist/protocol to PACU/ICU- (426 and 427)  Upon conclusion of case, patient transferred to which of the following locations: PACU/Non-ICU  Use of transfer checklist/protocol:   Exclusion: Service Performed in Patient Hospital Room (and thus did not require transfer):     PACU Reintubation- (AQI31)  General anesthesia requiring endotracheal intubation (ETT) along with subsequent extubation in OR or PACU: Yes  Required reintubation in the PACU: No   Extubation was a planned trial documented in the medical record prior to removal of the original airway device:  N/A    Unplanned admission to ICU related to anesthesia service up through end of PACU care- (MD51)  Unplanned admission to ICU (not initially anticipated at anesthesia start time): No

## 2019-10-03 NOTE — ANESTHESIA PREPROCEDURE EVALUATION
Lumbar spinal stenosis. MAVERICK with CPAP. HTN. > 6 METS. Compa CP/SOB    Relevant Problems   ANESTHESIA   (+) MAVERICK (obstructive sleep apnea)      CARDIAC   (+) Essential hypertension   (+) Nonrheumatic aortic valve insufficiency      GI   (+) Gastroesophageal reflux disease without esophagitis         (+) Stage 3 chronic kidney disease (HCC)       Physical Exam    Airway   Mallampati: III  TM distance: >3 FB  Neck ROM: full       Cardiovascular - normal exam  Rhythm: regular  Rate: normal  (-) murmur     Dental - normal exam         Pulmonary - normal exam  Breath sounds clear to auscultation     Abdominal    Neurological - normal exam                 Anesthesia Plan    ASA 2       Plan - general       Airway plan will be ETT        Induction: intravenous    Postoperative Plan: Postoperative administration of opioids is intended.    Pertinent diagnostic labs and testing reviewed    Informed Consent:    Anesthetic plan and risks discussed with patient.    Use of blood products discussed with: patient whom consented to blood products.

## 2019-10-03 NOTE — OR NURSING
Pt has been resting . Surgical pain tolerable. CPAP on. VSS. PALMA. AXOX4.    Pt feels he is 'grumpy'. Waiting for room since 1030.  Noisy in PACU. Difficulty sleeping. Gave reassurance that room has been asigned. Not ready. PACU is busy today. apologized for noise and that pt I has been very patient

## 2019-10-04 LAB
ANION GAP SERPL CALC-SCNC: 4 MMOL/L (ref 0–11.9)
BUN SERPL-MCNC: 23 MG/DL (ref 8–22)
CALCIUM SERPL-MCNC: 8.2 MG/DL (ref 8.5–10.5)
CHLORIDE SERPL-SCNC: 108 MMOL/L (ref 96–112)
CO2 SERPL-SCNC: 26 MMOL/L (ref 20–33)
CREAT SERPL-MCNC: 1.33 MG/DL (ref 0.5–1.4)
ERYTHROCYTE [DISTWIDTH] IN BLOOD BY AUTOMATED COUNT: 50.5 FL (ref 35.9–50)
GLUCOSE SERPL-MCNC: 149 MG/DL (ref 65–99)
HCT VFR BLD AUTO: 38.5 % (ref 42–52)
HGB BLD-MCNC: 12.5 G/DL (ref 14–18)
MCH RBC QN AUTO: 32 PG (ref 27–33)
MCHC RBC AUTO-ENTMCNC: 32.5 G/DL (ref 33.7–35.3)
MCV RBC AUTO: 98.5 FL (ref 81.4–97.8)
PLATELET # BLD AUTO: 179 K/UL (ref 164–446)
PMV BLD AUTO: 9.1 FL (ref 9–12.9)
POTASSIUM SERPL-SCNC: 4.3 MMOL/L (ref 3.6–5.5)
RBC # BLD AUTO: 3.91 M/UL (ref 4.7–6.1)
SODIUM SERPL-SCNC: 138 MMOL/L (ref 135–145)
WBC # BLD AUTO: 8.8 K/UL (ref 4.8–10.8)

## 2019-10-04 PROCEDURE — 80048 BASIC METABOLIC PNL TOTAL CA: CPT

## 2019-10-04 PROCEDURE — 36415 COLL VENOUS BLD VENIPUNCTURE: CPT

## 2019-10-04 PROCEDURE — 700111 HCHG RX REV CODE 636 W/ 250 OVERRIDE (IP): Performed by: NURSE PRACTITIONER

## 2019-10-04 PROCEDURE — 97535 SELF CARE MNGMENT TRAINING: CPT

## 2019-10-04 PROCEDURE — 770001 HCHG ROOM/CARE - MED/SURG/GYN PRIV*

## 2019-10-04 PROCEDURE — 97165 OT EVAL LOW COMPLEX 30 MIN: CPT

## 2019-10-04 PROCEDURE — 700112 HCHG RX REV CODE 229: Performed by: NURSE PRACTITIONER

## 2019-10-04 PROCEDURE — A9270 NON-COVERED ITEM OR SERVICE: HCPCS | Performed by: NURSE PRACTITIONER

## 2019-10-04 PROCEDURE — 97161 PT EVAL LOW COMPLEX 20 MIN: CPT

## 2019-10-04 PROCEDURE — 302135 SEQUENTIAL COMPRESSION MACHINE: Performed by: NEUROLOGICAL SURGERY

## 2019-10-04 PROCEDURE — 85027 COMPLETE CBC AUTOMATED: CPT

## 2019-10-04 PROCEDURE — 700102 HCHG RX REV CODE 250 W/ 637 OVERRIDE(OP): Performed by: NURSE PRACTITIONER

## 2019-10-04 PROCEDURE — 700101 HCHG RX REV CODE 250: Performed by: NURSE PRACTITIONER

## 2019-10-04 RX ORDER — AMOXICILLIN 250 MG
1 CAPSULE ORAL
Qty: 30 TAB | Refills: 0 | COMMUNITY
Start: 2019-10-04 | End: 2021-04-26

## 2019-10-04 RX ADMIN — OMEPRAZOLE 20 MG: 20 CAPSULE, DELAYED RELEASE ORAL at 20:26

## 2019-10-04 RX ADMIN — OXYCODONE HYDROCHLORIDE 5 MG: 5 TABLET ORAL at 14:25

## 2019-10-04 RX ADMIN — TIZANIDINE 4 MG: 4 TABLET ORAL at 14:25

## 2019-10-04 RX ADMIN — POTASSIUM CHLORIDE AND SODIUM CHLORIDE: 900; 150 INJECTION, SOLUTION INTRAVENOUS at 06:23

## 2019-10-04 RX ADMIN — TIZANIDINE 4 MG: 4 TABLET ORAL at 22:17

## 2019-10-04 RX ADMIN — CEFAZOLIN SODIUM 2 G: 2 INJECTION, SOLUTION INTRAVENOUS at 06:24

## 2019-10-04 RX ADMIN — CEFAZOLIN SODIUM 2 G: 2 INJECTION, SOLUTION INTRAVENOUS at 22:17

## 2019-10-04 RX ADMIN — CEFAZOLIN SODIUM 2 G: 2 INJECTION, SOLUTION INTRAVENOUS at 15:09

## 2019-10-04 RX ADMIN — OXYCODONE HYDROCHLORIDE 5 MG: 5 TABLET ORAL at 06:23

## 2019-10-04 RX ADMIN — SENNOSIDES, DOCUSATE SODIUM 1 TABLET: 50; 8.6 TABLET, FILM COATED ORAL at 20:27

## 2019-10-04 RX ADMIN — OXYCODONE HYDROCHLORIDE 10 MG: 10 TABLET ORAL at 10:30

## 2019-10-04 RX ADMIN — ROSUVASTATIN CALCIUM 10 MG: 5 TABLET, FILM COATED ORAL at 18:11

## 2019-10-04 RX ADMIN — OXYCODONE HYDROCHLORIDE 10 MG: 10 TABLET ORAL at 20:26

## 2019-10-04 RX ADMIN — THERA TABS 1 TABLET: TAB at 06:23

## 2019-10-04 RX ADMIN — TIZANIDINE 4 MG: 4 TABLET ORAL at 02:08

## 2019-10-04 RX ADMIN — OXYCODONE HYDROCHLORIDE 5 MG: 5 TABLET ORAL at 02:08

## 2019-10-04 RX ADMIN — DOCUSATE SODIUM 100 MG: 100 CAPSULE, LIQUID FILLED ORAL at 18:11

## 2019-10-04 RX ADMIN — DOCUSATE SODIUM 100 MG: 100 CAPSULE, LIQUID FILLED ORAL at 06:23

## 2019-10-04 RX ADMIN — LISINOPRIL 10 MG: 10 TABLET ORAL at 15:09

## 2019-10-04 ASSESSMENT — GAIT ASSESSMENTS
ASSISTIVE DEVICE: FRONT WHEEL WALKER
DISTANCE (FEET): 300
DEVIATION: DECREASED HEEL STRIKE;DECREASED TOE OFF
GAIT LEVEL OF ASSIST: SUPERVISED

## 2019-10-04 ASSESSMENT — ACTIVITIES OF DAILY LIVING (ADL): TOILETING: INDEPENDENT

## 2019-10-04 ASSESSMENT — COGNITIVE AND FUNCTIONAL STATUS - GENERAL
TURNING FROM BACK TO SIDE WHILE IN FLAT BAD: A LITTLE
MOVING FROM LYING ON BACK TO SITTING ON SIDE OF FLAT BED: A LITTLE
CLIMB 3 TO 5 STEPS WITH RAILING: A LITTLE
DRESSING REGULAR LOWER BODY CLOTHING: A LITTLE
STANDING UP FROM CHAIR USING ARMS: A LITTLE
SUGGESTED CMS G CODE MODIFIER MOBILITY: CK
DAILY ACTIVITIY SCORE: 23
MOBILITY SCORE: 18
MOVING TO AND FROM BED TO CHAIR: A LITTLE
SUGGESTED CMS G CODE MODIFIER DAILY ACTIVITY: CI
WALKING IN HOSPITAL ROOM: A LITTLE

## 2019-10-04 NOTE — PROGRESS NOTES
Neurosurgery Progress Note    Subjective:  Doing well, LBP and left anterior thigh pain, controlled w/ PO meds, left thigh feels a little weak but stood last noc w/o diff, voiding, eating    Exam:  BLE /  Inc c/d flat w / hvac 130    BP  Min: 108/58  Max: 127/69  Pulse  Av.1  Min: 50  Max: 97  Resp  Av.5  Min: 8  Max: 19  Temp  Av.5 °C (97.7 °F)  Min: 36.1 °C (96.9 °F)  Max: 37.2 °C (98.9 °F)  SpO2  Av.2 %  Min: 94 %  Max: 100 %    No data recorded    Recent Labs     10/04/19  0318   WBC 8.8   RBC 3.91*   HEMOGLOBIN 12.5*   HEMATOCRIT 38.5*   MCV 98.5*   MCH 32.0   MCHC 32.5*   RDW 50.5*   PLATELETCT 179   MPV 9.1     Recent Labs     10/04/19  0318   SODIUM 138   POTASSIUM 4.3   CHLORIDE 108   CO2 26   GLUCOSE 149*   BUN 23*   CREATININE 1.33   CALCIUM 8.2*               Intake/Output       10/03/19 0700 - 10/04/19 0659 10/04/19 07 - 10/05/19 0659       Total  0998-6893 Total       Intake    P.O.  300  -- 300  --  -- --    P.O. 300 -- 300 -- -- --    I.V.  2000  --  -- --    Volume (mL) (lactated ringers infusion) 2000 -- -- --    IV Piggyback  --  100 100  --  -- --    Volume (mL) (ceFAZolin in dextrose (ANCEF) IVPB premix 2 g) -- 100 100 -- -- --    Total Intake 2300 100 2400 -- -- --       Output    Urine  700  1300 2000  --  -- --    Urine 350 -- 350 -- -- --    Output (mL) ([REMOVED] Urethral Catheter Latex 16 Fr.) 350 1300 1650 -- -- --    Drains  190  250 440  --  -- --    Output (mL) (Closed/Suction Drain 1 Posterior Back Hemovac) 190 250 440 -- -- --    Blood  100  -- 100  --  -- --    Est. Blood Loss 100 -- 100 -- -- --    Total Output 990 1550 2540 -- -- --       Net I/O     1310 -1450 -140 -- -- --            Intake/Output Summary (Last 24 hours) at 10/4/2019 0811  Last data filed at 10/4/2019 0620  Gross per 24 hour   Intake 2400 ml   Output 2540 ml   Net -140 ml            • lisinopril  10 mg QDAY   • multivitamin  1 Tab DAILY   •  omeprazole  20 mg QHS   • rosuvastatin  10 mg Q EVENING   • Pharmacy Consult Request  1 Each PHARMACY TO DOSE   • MD ROQUE...DO NOT ADMINISTER NSAIDS or ASPIRIN unless ORDERED By Neurosurgery  1 Each PRN   • docusate sodium  100 mg BID   • senna-docusate  1 Tab Nightly   • senna-docusate  1 Tab Q24HRS PRN   • polyethylene glycol/lytes  1 Packet BID PRN   • magnesium hydroxide  30 mL QDAY PRN   • bisacodyl  10 mg Q24HRS PRN   • fleet  1 Each Once PRN   • 0.9 % NaCl with KCl 20 mEq 1,000 mL   Continuous   • ceFAZolin  2 g Q8HR   • diphenhydrAMINE  25 mg Q6HRS PRN    Or   • diphenhydrAMINE  25 mg Q6HRS PRN   • ondansetron  4 mg Q4HRS PRN   • ondansetron  4 mg Q4HRS PRN   • tizanidine  4 mg TID PRN   • labetalol  10 mg Q HOUR PRN   • hydrALAZINE  10 mg Q HOUR PRN   • oxyCODONE immediate-release  5 mg Q4HRS PRN    Or   • oxyCODONE immediate-release  10 mg Q4HRS PRN   • morphine injection  2 mg Q HOUR PRN       Assessment and Plan:    POD# 1 L4-5 redo lami/TLIF  Chemical prophylactic DVT therapy: No  Start date/time: n/a-- ambulatory    NM intact  Pain control- on orals  Pt/ot/amb w/ LSO on when OOB > 5 min  Cont hvac  Home 1-2 days when hvac out and amb ok  Has plenty of oxy at home and does not need rx on discharge  Prepped for discharge 1-2 days

## 2019-10-04 NOTE — DISCHARGE PLANNING
Anticipated Discharge Disposition: Home with home health    Action: Spoke with patient who anticipates going home after DC. He has a PCP and Rx coverage. Prior to admit he was independent with ADL/IADL.  He has a walker and shower chair at home. He states he has had not other service at home.     Barriers to Discharge: Surgical Clearance    Plan: TBD      Care Transition Team Assessment    Information Source  Orientation : Oriented x 4  Information Given By: Patient  Informant's Name: Valeriy  Who is responsible for making decisions for patient? : Patient    Readmission Evaluation  Is this a readmission?: No    Elopement Risk  Legal Hold: No  Elopement Risk: Not at Risk for Elopement    Interdisciplinary Discharge Planning  Primary Care Physician: Hue Alvarez MD  Lives with - Patient's Self Care Capacity: Alone and Able to Care For Self  Patient or legal guardian wants to designate a caregiver (see row info): No  Caregiver name: Colleen Craft  Caregiver relationship to patient: sister  Caregiver contact info: [phone]  Support Systems: Family Member(s), Friends / Neighbors  Housing / Facility: [address]  Do You Take your Prescribed Medications Regularly: Yes  Able to Return to Previous ADL's: Future Time w/Therapy  Mobility Issues: Yes  Prior Services: Home-Independent  Patient Expects to be Discharged to:: Home  Assistance Needed: Yes  Durable Medical Equipment: Walker(Shower Chair)    Discharge Preparedness  What is your plan after discharge?: Home with help  What are your discharge supports?: Sibling  Prior Functional Level: Ambulatory, Drives Self, Independent with Activities of Daily Living, Independent with Medication Management  Difficulity with ADLs: None  Difficulity with IADLs: None    Functional Assesment  Prior Functional Level: Ambulatory, Drives Self, Independent with Activities of Daily Living, Independent with Medication Management    Finances  Financial Barriers to Discharge:  No  Prescription Coverage: Yes    Vision / Hearing Impairment  Vision Impairment : Yes  Hearing Impairment : No         Advance Directive  Advance Directive?: None  Advance Directive offered?: AD Booklet refused    Domestic Abuse  Have you ever been the victim of abuse or violence?: No  Physical Abuse or Sexual Abuse: No  Verbal Abuse or Emotional Abuse: No  Possible Abuse Reported to:: Not Applicable    Psychological Assessment  History of Substance Abuse: None  History of Psychiatric Problems: No  Non-compliant with Treatment: No    Discharge Risks or Barriers  Discharge risks or barriers?: No    Anticipated Discharge Information  Anticipated discharge disposition: [facility], Home  Discharge Address: [address] [place] [address] [place], [place], [place]  Discharge Contact Phone Number: [phone]

## 2019-10-04 NOTE — PROGRESS NOTES
Bobby LINDA notified that patient does not want PCA pump and wants to take oral pain meds. Oxycodone 5-10 mg PO PRN Q4 and Morphine 2 mg IVP PRN Q1 ordered for pain. Remove oakley POD #1 and LSO brace will be delivered to bedside.

## 2019-10-04 NOTE — ANESTHESIA POSTPROCEDURE EVALUATION
Patient: Valeriy Orlando    Procedure Summary     Date:  10/03/19 Room / Location:  [facility] 05 / SURGERY [facility]    Anesthesia Start:  0709 Anesthesia Stop:  1012    Procedures:       FUSION, SPINE, LUMBAR, PLIF- REDO L4-5 WITH TLIF (Bilateral Spine Lumbar)      LAMINECTOMY, SPINE, LUMBAR, WITH DISCECTOMY (Bilateral Spine Lumbar) Diagnosis:  (LUMBAR SPONDYLOLISTHESIS)    Surgeon:  Kyler Juares M.D. Responsible Provider:  Carlos Giang M.D.    Anesthesia Type:  general ASA Status:  2          Final Anesthesia Type: general  Last vitals  BP   Blood Pressure : 119/74    Temp   36.1 °C (96.9 °F)    Pulse   Pulse: 60   Resp   16    SpO2   97 %      Anesthesia Post Evaluation    Patient location during evaluation: PACU  Patient participation: complete - patient participated  Level of consciousness: awake and alert  Pain score: 3    Airway patency: patent  Anesthetic complications: no  Cardiovascular status: hemodynamically stable  Respiratory status: acceptable  Hydration status: euvolemic    PONV: none           Nurse Pain Score: 3 (NPRS)

## 2019-10-04 NOTE — CARE PLAN
Problem: Safety  Goal: Will remain free from falls  Outcome: PROGRESSING AS EXPECTED  Intervention: Implement fall precautions  Flowsheets (Taken 10/3/2019 2200)  Environmental Precautions: Treaded Slipper Socks on Patient;Bed in Low Position  Note:   Educated about fall precaution and risk     Problem: Infection  Goal: Will remain free from infection  Outcome: PROGRESSING AS EXPECTED  Intervention: Implement standard precautions and perform hand washing before and after patient contact  Note:   Standard precautions implemented and explained to pt

## 2019-10-04 NOTE — PROGRESS NOTES
Two RN skin assessment    Pt has redness to R temple.  Blanching pink to elbows.  Hemovac and mepilex silver in place to lower back.   No PU present.  Pt repositions self in bed.

## 2019-10-04 NOTE — THERAPY
"Physical Therapy Evaluation completed.   Bed Mobility:  Supine to Sit: Supervised(HOB flat via log roll)  Transfers: Sit to Stand: Supervised  Gait: Level Of Assist: Supervised with Front-Wheel Walker       Plan of Care: Will benefit from Physical Therapy 4 times per week  Discharge Recommendations: Equipment: No Equipment Needed. Post-acute therapy Discharge to home with outpatient or home health for additional skilled therapy services.    Pt is 74 y/o M s/p L4-L5 lami and fusion. Pt demonstrating impairments that include pain, impaired strength, and decreased functional mob. He reports pain 5/10 with transitional mvts only that quickly reduces with rest. He was educated on spinal precautions and was able to demonstrate log roll for supine>sit bed mobility. Pt requiring use of FWW 2/2 LE weakness. Pt tolerating amb x300ft with FWW and SPV. He is able to safely negotiate turns and around obstacles in pathway. Pt is earl to safely negotiate 2 steps with BHR support and SPV. Anticipate DC home recommend home health transitional care for continued physical therapy services.    See \"Rehab Therapy-Acute\" Patient Summary Report for complete documentation.     "

## 2019-10-04 NOTE — THERAPY
"Occupational Therapy Evaluation completed.   Functional Status:  Min/supervised with ADLs and txfs  Plan of Care: Will benefit from Occupational Therapy 3 visits  Discharge Recommendations:  Equipment: Will Continue to Assess for Equipment Needs. Post-acute therapy Discharge to home with  home health for additional skilled therapy services.    See \"Rehab Therapy-Acute\" Patient Summary Report for complete documentation.    "

## 2019-10-04 NOTE — CARE PLAN
Problem: Infection  Goal: Will remain free from infection  Outcome: PROGRESSING AS EXPECTED  Intervention: Assess signs and symptoms of infection  Note:   Pt afebrile. No s/s infection. Pt receiving Ancef Q8.     Problem: Pain Management  Goal: Pain level will decrease to patient's comfort goal  Outcome: PROGRESSING AS EXPECTED  Intervention: Follow pain managment plan developed in collaboration with patient and Interdisciplinary Team  Note:   Pt does not want PCA pump. Oxycodone 5 mg pRN Q4 given and flexeril moderately controls pain. Will continue to monitor.

## 2019-10-05 LAB
ANION GAP SERPL CALC-SCNC: 6 MMOL/L (ref 0–11.9)
BUN SERPL-MCNC: 22 MG/DL (ref 8–22)
CALCIUM SERPL-MCNC: 8.3 MG/DL (ref 8.5–10.5)
CHLORIDE SERPL-SCNC: 104 MMOL/L (ref 96–112)
CO2 SERPL-SCNC: 27 MMOL/L (ref 20–33)
CREAT SERPL-MCNC: 1.35 MG/DL (ref 0.5–1.4)
ERYTHROCYTE [DISTWIDTH] IN BLOOD BY AUTOMATED COUNT: 50.4 FL (ref 35.9–50)
GLUCOSE SERPL-MCNC: 110 MG/DL (ref 65–99)
HCT VFR BLD AUTO: 38 % (ref 42–52)
HGB BLD-MCNC: 12.5 G/DL (ref 14–18)
MCH RBC QN AUTO: 32.6 PG (ref 27–33)
MCHC RBC AUTO-ENTMCNC: 32.9 G/DL (ref 33.7–35.3)
MCV RBC AUTO: 99 FL (ref 81.4–97.8)
PLATELET # BLD AUTO: 159 K/UL (ref 164–446)
PMV BLD AUTO: 9.1 FL (ref 9–12.9)
POTASSIUM SERPL-SCNC: 4.1 MMOL/L (ref 3.6–5.5)
RBC # BLD AUTO: 3.84 M/UL (ref 4.7–6.1)
SODIUM SERPL-SCNC: 137 MMOL/L (ref 135–145)
WBC # BLD AUTO: 8.6 K/UL (ref 4.8–10.8)

## 2019-10-05 PROCEDURE — 770001 HCHG ROOM/CARE - MED/SURG/GYN PRIV*

## 2019-10-05 PROCEDURE — 700111 HCHG RX REV CODE 636 W/ 250 OVERRIDE (IP): Performed by: NURSE PRACTITIONER

## 2019-10-05 PROCEDURE — 700102 HCHG RX REV CODE 250 W/ 637 OVERRIDE(OP): Performed by: NURSE PRACTITIONER

## 2019-10-05 PROCEDURE — 80048 BASIC METABOLIC PNL TOTAL CA: CPT

## 2019-10-05 PROCEDURE — 36415 COLL VENOUS BLD VENIPUNCTURE: CPT

## 2019-10-05 PROCEDURE — 85027 COMPLETE CBC AUTOMATED: CPT

## 2019-10-05 PROCEDURE — 700112 HCHG RX REV CODE 229: Performed by: NURSE PRACTITIONER

## 2019-10-05 PROCEDURE — A9270 NON-COVERED ITEM OR SERVICE: HCPCS | Performed by: NURSE PRACTITIONER

## 2019-10-05 RX ADMIN — CEFAZOLIN SODIUM 2 G: 2 INJECTION, SOLUTION INTRAVENOUS at 23:18

## 2019-10-05 RX ADMIN — OMEPRAZOLE 20 MG: 20 CAPSULE, DELAYED RELEASE ORAL at 21:01

## 2019-10-05 RX ADMIN — TIZANIDINE 4 MG: 4 TABLET ORAL at 06:43

## 2019-10-05 RX ADMIN — OXYCODONE HYDROCHLORIDE 5 MG: 5 TABLET ORAL at 21:02

## 2019-10-05 RX ADMIN — OXYCODONE HYDROCHLORIDE 10 MG: 10 TABLET ORAL at 05:00

## 2019-10-05 RX ADMIN — OXYCODONE HYDROCHLORIDE 10 MG: 10 TABLET ORAL at 01:10

## 2019-10-05 RX ADMIN — ROSUVASTATIN CALCIUM 10 MG: 5 TABLET, FILM COATED ORAL at 18:11

## 2019-10-05 RX ADMIN — OXYCODONE HYDROCHLORIDE 10 MG: 10 TABLET ORAL at 11:33

## 2019-10-05 RX ADMIN — TIZANIDINE 4 MG: 4 TABLET ORAL at 22:01

## 2019-10-05 RX ADMIN — CEFAZOLIN SODIUM 2 G: 2 INJECTION, SOLUTION INTRAVENOUS at 08:18

## 2019-10-05 RX ADMIN — OXYCODONE HYDROCHLORIDE 5 MG: 5 TABLET ORAL at 16:07

## 2019-10-05 RX ADMIN — SENNOSIDES, DOCUSATE SODIUM 1 TABLET: 50; 8.6 TABLET, FILM COATED ORAL at 21:01

## 2019-10-05 RX ADMIN — DOCUSATE SODIUM 100 MG: 100 CAPSULE, LIQUID FILLED ORAL at 04:53

## 2019-10-05 RX ADMIN — THERA TABS 1 TABLET: TAB at 04:53

## 2019-10-05 RX ADMIN — DOCUSATE SODIUM 100 MG: 100 CAPSULE, LIQUID FILLED ORAL at 18:11

## 2019-10-05 RX ADMIN — CEFAZOLIN SODIUM 2 G: 2 INJECTION, SOLUTION INTRAVENOUS at 15:59

## 2019-10-05 RX ADMIN — LISINOPRIL 10 MG: 10 TABLET ORAL at 15:58

## 2019-10-05 NOTE — PROGRESS NOTES
Patient in stable condition. Alert and oriented. VSS on RA. Up to chair for dinner. Good PO intake. Denies need for pain medication at this time. Transferred back to bed with brace in place and using walker. Tolerated well. Drain compressed- serosang drainage. Bed in low position and locked, call light in reach. Please see flowsheets for more information.

## 2019-10-05 NOTE — PROGRESS NOTES
"Neurosurgery Progress Note    Subjective:  Pain moderate to severe overnight, better this am  \"I am not comfortable going home today, I want to make sure tonight goes better\"    Exam:  A&O, sitting up in chair  BLE   Inc c/d flat w / hvac 220/24hr yesterday    BP  Min: 110/85  Max: 122/84  Pulse  Av.3  Min: 63  Max: 76  Resp  Av.7  Min: 17  Max: 18  Temp  Av.1 °C (98.7 °F)  Min: 36.3 °C (97.3 °F)  Max: 37.5 °C (99.5 °F)  SpO2  Av.7 %  Min: 95 %  Max: 96 %    No data recorded    Recent Labs     10/04/19  0318 10/05/19  0232   WBC 8.8 8.6   RBC 3.91* 3.84*   HEMOGLOBIN 12.5* 12.5*   HEMATOCRIT 38.5* 38.0*   MCV 98.5* 99.0*   MCH 32.0 32.6   MCHC 32.5* 32.9*   RDW 50.5* 50.4*   PLATELETCT 179 159*   MPV 9.1 9.1     Recent Labs     10/04/19  0318 10/05/19  0232   SODIUM 138 137   POTASSIUM 4.3 4.1   CHLORIDE 108 104   CO2 26 27   GLUCOSE 149* 110*   BUN 23* 22   CREATININE 1.33 1.35   CALCIUM 8.2* 8.3*               Intake/Output       10/04/19 0700 - 10/05/19 0659 10/05/19 0700 - 10/06/19 0659       Total  Total       Intake    P.O.  400  500 900  --  -- --    P.O. 400 500 900 -- -- --    Total Intake 400 500 900 -- -- --       Output    Urine  --  -- --  --  -- --    Number of Times Voided 1 x -- 1 x -- -- --    Drains  120  100 220  --  -- --    Output (mL) (Closed/Suction Drain 1 Posterior Back Hemovac) 120 100 220 -- -- --    Total Output 120 100 220 -- -- --       Net I/O     280 400 680 -- -- --            Intake/Output Summary (Last 24 hours) at 10/5/2019 0927  Last data filed at 10/5/2019 0500  Gross per 24 hour   Intake 900 ml   Output 220 ml   Net 680 ml            • lisinopril  10 mg QDAY   • multivitamin  1 Tab DAILY   • omeprazole  20 mg QHS   • rosuvastatin  10 mg Q EVENING   • Pharmacy Consult Request  1 Each PHARMACY TO DOSE   • MD ALERT...DO NOT ADMINISTER NSAIDS or ASPIRIN unless ORDERED By Neurosurgery  1 Each PRN   • docusate sodium  100 " mg BID   • senna-docusate  1 Tab Nightly   • senna-docusate  1 Tab Q24HRS PRN   • polyethylene glycol/lytes  1 Packet BID PRN   • magnesium hydroxide  30 mL QDAY PRN   • bisacodyl  10 mg Q24HRS PRN   • fleet  1 Each Once PRN   • 0.9 % NaCl with KCl 20 mEq 1,000 mL   Continuous   • ceFAZolin  2 g Q8HR   • diphenhydrAMINE  25 mg Q6HRS PRN    Or   • diphenhydrAMINE  25 mg Q6HRS PRN   • ondansetron  4 mg Q4HRS PRN   • ondansetron  4 mg Q4HRS PRN   • tizanidine  4 mg TID PRN   • labetalol  10 mg Q HOUR PRN   • hydrALAZINE  10 mg Q HOUR PRN   • oxyCODONE immediate-release  5 mg Q4HRS PRN    Or   • oxyCODONE immediate-release  10 mg Q4HRS PRN   • morphine injection  2 mg Q HOUR PRN       Assessment and Plan:    POD# 2 L4-5 redo lami/TLIF  Chemical prophylactic DVT therapy: No  Start date/time: n/a-- ambulatory    NM intact  Pain control- on orals  Pt/ot/amb w/ LSO on when OOB > 5 min  Cont hvac  Likely home tomorrow if HVAC output decreased, pain controlled overnight      Has plenty of oxy at home and does not need rx on discharge  Prepped for discharge 1-2 days

## 2019-10-05 NOTE — PROGRESS NOTES
RN MOBILITY NOTE     Surgery patient?: yes  Date of surgery: 10/3  Ambulated 50 ft on day of surgery? (N/A if today is not date of surgery): n/a  Number of times ambulated 50 feet or greater today: 1  Patient has been up to chair, edge of bed or HOB 90 degrees for all meals?: yes  Goal met? (goal is ambulating at least 50 feet 2 times on day shift, one time on night shift): yes   If patient did not meet mobility goal, why?:

## 2019-10-05 NOTE — DISCHARGE SUMMARY
DATE OF ADMISSION:  10/03/2019    DATE OF DISCHARGE:  10/05/2019 or 10/06/2019    ADMITTING DIAGNOSES:  L4-L5 spondylolisthesis, stenosis, and status post a   previous lumbar laminectomy by another surgeon.    HISTORY OF PRESENT ILLNESS:  Please refer to the previously dictated history   and physical for complete details.  The patient is a 73-year-old patient of   Dr. Juares who had the above findings.  Dr. Juares discussed surgery and the   risks and benefits and the patient wished to proceed.    COURSE OF HOSPITALIZATION:  The patient was admitted to [facility] on 10/03/2019   and on that date, he was brought to the operating room where he underwent a   redo L4-L5 laminectomy, transforaminal lumbar interbody fusion and pedicle   screw instrumented fusion at L4-L5 with Dr. Juares on 10/03/2019.    Postoperatively, he is doing well.  He has some low back pain and left   anterior thigh pain that is controlled on oral medications.  His strength is   5/5.  He is beginning to mobilize with physical therapy with his LSO brace on.    He is voiding and eating.  His Hemovac is putting out bloody drainage and   will be removed when less than 30 mL in an 8-hour period.  He will be able to   discharge home in the next day or two once his Hemovac is out, if his pain is   controlled and he is ambulatory.    DISCHARGE INSTRUCTIONS:  Provided on discharge.    DISCHARGE MEDICATIONS:  None.  The patient has oxycodone 5 mg tablets at home   and does not need a prescription.    IMPRESSION AND PLAN:  1.  Admitting diagnosis:  L4-L5 spondylolisthesis, stenosis and status post   previous lumbar laminectomy by another surgeon.  2.  Operation performed:  Redo L4-L5 laminectomy, transforaminal lumbar   interbody fusion and pedicle screw instrumented fusion at L4-L5 with Dr. Juares on 10/03/2019.  3.  The patient will be discharged home in the next day or two with the   instructions as outlined above once his Hemovac was removed and he is    ambulatory and has good pain control.       ____________________________________     EUSEBIO CEDILLO    DD:  10/04/2019 08:20:50  DT:  10/04/2019 15:10:57    D#:  [id number]  Job#:  [id number]

## 2019-10-05 NOTE — THERAPY
Pt is amb [facility] distances w/nrsg and FWW. Supervision only for his functional tasks. Pt will have his sisters support @ d/c. No equipment needs.  Pt is cleared for d/c home from PT standpoint.

## 2019-10-05 NOTE — CARE PLAN
Problem: Infection  Goal: Will remain free from infection  Outcome: PROGRESSING AS EXPECTED  Intervention: Implement standard precautions and perform hand washing before and after patient contact  Note:   Educated about the importance of hand hygiene and hemovac/dressing care     Problem: Pain Management  Goal: Pain level will decrease to patient's comfort goal  Outcome: PROGRESSING AS EXPECTED  Intervention: Educate and implement non-pharmacologic comfort measures. Examples: relaxation, distration, play therapy, activity therapy, massage, etc.  Flowsheets (Taken 10/4/2019 2015)  Intervention: Medication (see MAR)

## 2019-10-06 VITALS
BODY MASS INDEX: 31.92 KG/M2 | DIASTOLIC BLOOD PRESSURE: 72 MMHG | OXYGEN SATURATION: 94 % | WEIGHT: 235.67 LBS | RESPIRATION RATE: 18 BRPM | SYSTOLIC BLOOD PRESSURE: 111 MMHG | TEMPERATURE: 98.1 F | HEIGHT: 72 IN | HEART RATE: 58 BPM

## 2019-10-06 PROCEDURE — 700111 HCHG RX REV CODE 636 W/ 250 OVERRIDE (IP): Performed by: NURSE PRACTITIONER

## 2019-10-06 PROCEDURE — A9270 NON-COVERED ITEM OR SERVICE: HCPCS | Performed by: NURSE PRACTITIONER

## 2019-10-06 PROCEDURE — 700102 HCHG RX REV CODE 250 W/ 637 OVERRIDE(OP): Performed by: NURSE PRACTITIONER

## 2019-10-06 PROCEDURE — 700112 HCHG RX REV CODE 229: Performed by: NURSE PRACTITIONER

## 2019-10-06 RX ORDER — OXYCODONE HYDROCHLORIDE 5 MG/1
5-10 TABLET ORAL EVERY 6 HOURS PRN
Qty: 56 TAB | Refills: 0 | Status: SHIPPED | OUTPATIENT
Start: 2019-10-06 | End: 2019-10-13

## 2019-10-06 RX ORDER — TIZANIDINE 4 MG/1
4 TABLET ORAL 3 TIMES DAILY PRN
Qty: 60 TAB | Refills: 3 | Status: SHIPPED | OUTPATIENT
Start: 2019-10-06 | End: 2020-01-20

## 2019-10-06 RX ADMIN — POLYETHYLENE GLYCOL 3350 1 PACKET: 17 POWDER, FOR SOLUTION ORAL at 04:07

## 2019-10-06 RX ADMIN — BISACODYL 10 MG: 10 SUPPOSITORY RECTAL at 11:25

## 2019-10-06 RX ADMIN — OXYCODONE HYDROCHLORIDE 5 MG: 5 TABLET ORAL at 04:07

## 2019-10-06 RX ADMIN — THERA TABS 1 TABLET: TAB at 04:07

## 2019-10-06 RX ADMIN — OXYCODONE HYDROCHLORIDE 5 MG: 5 TABLET ORAL at 10:22

## 2019-10-06 RX ADMIN — TIZANIDINE 4 MG: 4 TABLET ORAL at 06:39

## 2019-10-06 RX ADMIN — CEFAZOLIN SODIUM 2 G: 2 INJECTION, SOLUTION INTRAVENOUS at 10:22

## 2019-10-06 RX ADMIN — DOCUSATE SODIUM 100 MG: 100 CAPSULE, LIQUID FILLED ORAL at 04:07

## 2019-10-06 NOTE — PROGRESS NOTES
Neurosurgery Progress Note    Subjective:  Doing well would like to go home  Would like to work on BM first    Exam:  BLE str intact- CDI with silver dressing- superior portion observed, CDI per nursing, as in Brace up in chair.    BP  Min: 111/72  Max: 121/87  Pulse  Av.3  Min: 58  Max: 95  Resp  Av.5  Min: 17  Max: 18  Temp  Av.1 °C (98.7 °F)  Min: 36.7 °C (98.1 °F)  Max: 37.3 °C (99.1 °F)  SpO2  Av.8 %  Min: 90 %  Max: 94 %    No data recorded    Recent Labs     10/04/19  0318 10/05/19  0232   WBC 8.8 8.6   RBC 3.91* 3.84*   HEMOGLOBIN 12.5* 12.5*   HEMATOCRIT 38.5* 38.0*   MCV 98.5* 99.0*   MCH 32.0 32.6   MCHC 32.5* 32.9*   RDW 50.5* 50.4*   PLATELETCT 179 159*   MPV 9.1 9.1     Recent Labs     10/04/19  0318 10/05/19  0232   SODIUM 138 137   POTASSIUM 4.3 4.1   CHLORIDE 108 104   CO2 26 27   GLUCOSE 149* 110*   BUN 23* 22   CREATININE 1.33 1.35   CALCIUM 8.2* 8.3*               Intake/Output       10/05/19 0700 - 10/06/19 0659 10/06/19 0700 - 10/07/19 0659       Total  Total       Intake    P.O.  240  240 480  --  -- --    P.O. 240 240 480 -- -- --    IV Piggyback  --  700 700  --  -- --    Volume (mL) (ceFAZolin in dextrose (ANCEF) IVPB premix 2 g) -- 700 700 -- -- --    Total Intake  -- -- --       Output    Urine  --  200 200  --  -- --    Number of Times Voided 2 x 1 x 3 x -- -- --    Urine Void (mL) -- 200 200 -- -- --    Drains  10  10 20  --  -- --    Output (mL) ([REMOVED] Closed/Suction Drain 1 Posterior Back Hemovac) 10 10 20 -- -- --    Total Output 10 210 220 -- -- --       Net I/O     230 730 960 -- -- --            Intake/Output Summary (Last 24 hours) at 10/6/2019 1059  Last data filed at 10/6/2019 0640  Gross per 24 hour   Intake 940 ml   Output 220 ml   Net 720 ml            • lisinopril  10 mg QDAY   • multivitamin  1 Tab DAILY   • omeprazole  20 mg QHS   • rosuvastatin  10 mg Q EVENING   • Pharmacy Consult Request  1 Each  PHARMACY TO DOSE   • MD ALERT...DO NOT ADMINISTER NSAIDS or ASPIRIN unless ORDERED By Neurosurgery  1 Each PRN   • docusate sodium  100 mg BID   • senna-docusate  1 Tab Nightly   • senna-docusate  1 Tab Q24HRS PRN   • polyethylene glycol/lytes  1 Packet BID PRN   • magnesium hydroxide  30 mL QDAY PRN   • bisacodyl  10 mg Q24HRS PRN   • fleet  1 Each Once PRN   • 0.9 % NaCl with KCl 20 mEq 1,000 mL   Continuous   • ceFAZolin  2 g Q8HR   • diphenhydrAMINE  25 mg Q6HRS PRN    Or   • diphenhydrAMINE  25 mg Q6HRS PRN   • ondansetron  4 mg Q4HRS PRN   • ondansetron  4 mg Q4HRS PRN   • tizanidine  4 mg TID PRN   • labetalol  10 mg Q HOUR PRN   • hydrALAZINE  10 mg Q HOUR PRN   • oxyCODONE immediate-release  5 mg Q4HRS PRN    Or   • oxyCODONE immediate-release  10 mg Q4HRS PRN   • morphine injection  2 mg Q HOUR PRN       Assessment and Plan:    POD# 3 L4-5 redo lami/TLIF  Chemical prophylactic DVT therapy: No  Start date/time: n/a-- ambulatory    Patient states no pain meds at home- only 5# tabs.   only states script from one year ago 2018. [facility]

## 2019-10-06 NOTE — CARE PLAN
Problem: Safety  Goal: Will remain free from falls  Outcome: PROGRESSING AS EXPECTED  Intervention: Assess risk factors for falls  Note:   Fall risk assessed (see flow sheet). Bed in low position.      Problem: Infection  Goal: Will remain free from infection  Outcome: PROGRESSING AS EXPECTED  Intervention: Implement standard precautions and perform hand washing before and after patient contact  Note:     Hand hygiene and standard precautions implemented     0700, 10/6: Report given to Heather

## 2019-10-06 NOTE — DISCHARGE INSTRUCTIONS
Discharge Instructions    Discharged to home by car with relative. Discharged via wheelchair, hospital escort: Yes.  Special equipment needed: LSO    Be sure to schedule a follow-up appointment with your primary care doctor or any specialists as instructed.     Discharge Plan:     Ok to shower, pat incision dry, leave open to air- no dressing   No Aspirin or non-steroidal anti-inflammatory medications (aleve, motrin, ibuprofen, celebrex)  No driving for 2 weeks/ No driving while on narcotic medication  Over the counter stool softeners daily while on narcotics  No lifting greater than 10 pounds, no repetitive bending or twisting  Follow up at [facility] 2 weeks after surgery    Diet Plan: Discussed  Activity Level: Discussed  Confirmed Follow up Appointment: Appointment Scheduled  Confirmed Symptoms Management: Discussed  Medication Reconciliation Updated: Yes  Influenza Vaccine Indication: Not indicated: Previously immunized this influenza season and > 8 years of age    I understand that a diet low in cholesterol, fat, and sodium is recommended for good health. Unless I have been given specific instructions below for another diet, I accept this instruction as my diet prescription.   Other diet: Regular    Special Instructions: None    · Is patient discharged on Warfarin / Coumadin?   No     Depression / Suicide Risk    As you are discharged from this [facility] facility, it is important to learn how to keep safe from harming yourself.    Recognize the warning signs:  · Abrupt changes in personality, positive or negative- including increase in energy   · Giving away possessions  · Change in eating patterns- significant weight changes-  positive or negative  · Change in sleeping patterns- unable to sleep or sleeping all the time   · Unwillingness or inability to communicate  · Depression  · Unusual sadness, discouragement and loneliness  · Talk of wanting to die  · Neglect of personal  appearance   · Rebelliousness- reckless behavior  · Withdrawal from people/activities they love  · Confusion- inability to concentrate     If you or a loved one observes any of these behaviors or has concerns about self-harm, here's what you can do:  · Talk about it- your feelings and reasons for harming yourself  · Remove any means that you might use to hurt yourself (examples: pills, rope, extension cords, firearm)  · Get professional help from the community (Mental Health, Substance Abuse, psychological counseling)  · Do not be alone:Call your Safe Contact- someone whom you trust who will be there for you.  · Call your local CRISIS HOTLINE 625-5683 or 844-840-0493  · Call your local Children's Mobile Crisis Response Team [place] (068) 218-1714 or www.[employer]  · Call the toll free National Suicide Prevention Hotlines   · National Suicide Prevention Lifeline 925-339-NOYV (1916)  · National Hope Line Network 800-SUICIDE (316-5803)

## 2019-11-14 ENCOUNTER — HOSPITAL ENCOUNTER (OUTPATIENT)
Dept: RADIOLOGY | Facility: MEDICAL CENTER | Age: 74
End: 2019-11-14
Attending: NURSE PRACTITIONER
Payer: MEDICARE

## 2019-11-14 DIAGNOSIS — M43.16 SPONDYLOLISTHESIS, LUMBAR REGION: ICD-10-CM

## 2019-11-14 PROCEDURE — 72100 X-RAY EXAM L-S SPINE 2/3 VWS: CPT

## 2020-01-16 ENCOUNTER — APPOINTMENT (OUTPATIENT)
Dept: MEDICAL GROUP | Facility: MEDICAL CENTER | Age: 75
End: 2020-01-16
Payer: MEDICARE

## 2020-01-20 ENCOUNTER — OFFICE VISIT (OUTPATIENT)
Dept: MEDICAL GROUP | Facility: MEDICAL CENTER | Age: 75
End: 2020-01-20
Payer: MEDICARE

## 2020-01-20 VITALS
TEMPERATURE: 97.8 F | HEIGHT: 71 IN | BODY MASS INDEX: 33.46 KG/M2 | SYSTOLIC BLOOD PRESSURE: 128 MMHG | WEIGHT: 239 LBS | RESPIRATION RATE: 14 BRPM | HEART RATE: 72 BPM | OXYGEN SATURATION: 98 % | DIASTOLIC BLOOD PRESSURE: 76 MMHG

## 2020-01-20 DIAGNOSIS — G89.29 CHRONIC RIGHT-SIDED LOW BACK PAIN WITHOUT SCIATICA: ICD-10-CM

## 2020-01-20 DIAGNOSIS — E78.00 ELEVATED LDL CHOLESTEROL LEVEL: ICD-10-CM

## 2020-01-20 DIAGNOSIS — D64.9 ANEMIA, UNSPECIFIED TYPE: ICD-10-CM

## 2020-01-20 DIAGNOSIS — I10 ESSENTIAL HYPERTENSION: ICD-10-CM

## 2020-01-20 DIAGNOSIS — R73.03 PREDIABETES: ICD-10-CM

## 2020-01-20 DIAGNOSIS — R94.39 ABNORMAL STRESS TEST: ICD-10-CM

## 2020-01-20 DIAGNOSIS — N18.30 STAGE 3 CHRONIC KIDNEY DISEASE: ICD-10-CM

## 2020-01-20 DIAGNOSIS — M54.50 CHRONIC RIGHT-SIDED LOW BACK PAIN WITHOUT SCIATICA: ICD-10-CM

## 2020-01-20 PROBLEM — R19.7 DIARRHEA OF PRESUMED INFECTIOUS ORIGIN: Status: RESOLVED | Noted: 2019-05-30 | Resolved: 2020-01-20

## 2020-01-20 LAB
HBA1C MFR BLD: 5.8 % (ref 0–5.6)
INT CON NEG: NEGATIVE
INT CON POS: POSITIVE

## 2020-01-20 PROCEDURE — 99214 OFFICE O/P EST MOD 30 MIN: CPT | Performed by: FAMILY MEDICINE

## 2020-01-20 PROCEDURE — 83036 HEMOGLOBIN GLYCOSYLATED A1C: CPT | Performed by: FAMILY MEDICINE

## 2020-01-20 ASSESSMENT — PATIENT HEALTH QUESTIONNAIRE - PHQ9: CLINICAL INTERPRETATION OF PHQ2 SCORE: 0

## 2020-01-20 NOTE — ASSESSMENT & PLAN NOTE
Chronic problem. Last A1C was 5.9%. States he has been doing working on reducing sugar/carbs. Up 6 lbs since last visits. Exercising regularly.

## 2020-01-21 NOTE — PROGRESS NOTES
Subjective:     CC: Diagnoses of Chronic right-sided low back pain without sciatica, Prediabetes, Essential hypertension, Elevated LDL cholesterol level, Anemia, unspecified type, Abnormal stress test, and Stage 3 chronic kidney disease (HCC) were pertinent to this visit.    HPI: Patient is a 74 y.o. male established patient who presents today to regular follow-up.      Chronic right-sided low back pain without sciatica  Chronic problem.  Continues to have some mild pain.  S/p spinal surgery. Doing better. Able to sleep again.     Prediabetes  Chronic problem. Last A1C was 5.9%. States he has been working on reducing sugar/carbs. Up 6 lbs since last visits. Exercising regularly.  A1c is 5.8% today.    Essential hypertension  Chronic problem. Generally well controlled with lisinopril 10mg.  Pressure well controlled today.    Elevated LDL cholesterol level  Chronic problem.  Currently on rosuvastatin.   Last lipid panel done 1 year ago and was well controlled.    Abnormal stress test  Chronic problem.  The patient is yet to schedule an appoint with cardiology which he had been advised to do at his last appointment.  He has not been seen by them for around 2 years.  He denies any chest pain.    Stage 3 chronic kidney disease (HCC)  Chronic problem.  Last BMP done 10/5/2019 and reviewed today.  Creatinine was 1.35, BUN 22, GFR 52.  This is been stable over the past few years.      Past Medical History:   Diagnosis Date   • Depression     pre diabetes   • Diabetes (HCC)     prediabetic   • Heart burn     GERD   • Hepatitis A 2001   • High cholesterol     patient denies, states medication is for pre-diabetes   • Hypertension     patient denies, states lisinopril is for pre-diabetes   • Murmur    • Prediabetes    • Sleep apnea     cpap in use   • Snoring        Social History     Tobacco Use   • Smoking status: Never Smoker   • Smokeless tobacco: Never Used   Substance Use Topics   • Alcohol use: Yes     Alcohol/week: 0.6  "oz     Types: 1 Glasses of wine per week     Frequency: 2-4 times a month     Drinks per session: 1 or 2     Binge frequency: Never   • Drug use: Yes     Comment: Uses CBD oil - last use 9/26/19       Current Outpatient Medications Ordered in Epic   Medication Sig Dispense Refill   • senna-docusate (PERICOLACE OR SENOKOT S) 8.6-50 MG Tab Take 1 Tab by mouth. 30 Tab 0   • omeprazole (PRILOSEC) 20 MG delayed-release capsule Take 20 mg by mouth every bedtime.     • Probiotic Product (PROBIOTIC-10) Cap Take 1 Cap by mouth every day.     • multivitamin (THERAGRAN) Tab Take 1 Tab by mouth every day.     • rosuvastatin (CRESTOR) 10 MG Tab Take 1 Tab by mouth every evening. 90 Tab 1   • lisinopril (PRINIVIL) 10 MG Tab TAKE 1 TABLET BY MOUTH EVERY DAY 90 Tab 3     No current Epic-ordered facility-administered medications on file.        Allergies:  Ibuprofen    Health Maintenance: Completed    ROS:  Gen: no fevers/chill, no changes in weight  Eyes: no changes in vision  ENT: no sore throat, no hearing loss, no bloody nose  Pulm: no sob, no cough  CV: no chest pain, no palpitations  GI: no nausea/vomiting, no diarrhea  : no dysuria  MSk: no myalgias  Skin: no rash  Neuro: no headaches, no numbness/tingling  Heme/Lymph: no easy bruising      Objective:       Exam:  /76 (BP Location: Right arm, Patient Position: Sitting, BP Cuff Size: Adult)   Pulse 72   Temp 36.6 °C (97.8 °F) (Temporal)   Resp 14   Ht 1.803 m (5' 11\")   Wt 108.4 kg (239 lb)   SpO2 98%   BMI 33.33 kg/m²  Body mass index is 33.33 kg/m².    General: Normal appearing. No distress.  HEAD: NCAT  EYES: conjunctiva clear, lids without ptosis, pupils equal and reactive to light  EARS: ears normal shape and contour.  MOUTH: normal dentition   Neck:  Normal ROM  Pulmonary: Clear to auscultation bilaterally, no wheezes rales or rhonchi.  Normal effort. Normal respiratory rate.  Cardiovascular: Regular rate and rhythm, no murmurs rubs or gallops.  Well " perfused. No LE edema  Neurologic: Grossly normal, no focal deficits  Skin: Warm and dry.  No obvious lesions.  Musculoskeletal: Normal gait and station.   Psych: Normal mood and affect. Alert and oriented x3. Judgment and insight is normal.      Labs: 10/5/2019 results reviewed and discussed with the patient, questions answered.    Assessment & Plan:     74 y.o. male with the following -     1. Chronic right-sided low back pain without sciatica  Chronic problem, now improved status post spinal surgery.  Following with neurosurgery.    2. Prediabetes  Chronic problem.  A1c is 5.8% today.  Discussed diet and exercise again today.  Plan to follow-up in around 6 months.    3. Essential hypertension  Chronic problem, well-controlled on lisinopril 10 mg.  Order the following labs.  - Comp Metabolic Panel; Future  - CBC WITHOUT DIFFERENTIAL; Future    4. Elevated LDL cholesterol level  Chronic problem, well-controlled on rosuvastatin.  Last labs done 1 year ago.  Repeat lipid panel ordered.  - Lipid Profile; Future    5. Anemia, unspecified type  New problem.  Noted on labs done following his spinal surgery.  Repeat CBC ordered to ensure that this is normalized.  - CBC WITHOUT DIFFERENTIAL; Future    6. Abnormal stress test  Chronic problem.  The patient is well aware that he needs to schedule an appointment with his cardiologist regarding his abnormal stress test.  He states that he will get this scheduled.  Of note, he has had no chest pain or shortness of breath.    7. Stage 3 chronic kidney disease (HCC)  Chronic problem, stable.  Last labs done 10/5/2019.  GFR 52.      Return in about 6 months (around 7/20/2020), or if symptoms worsen or fail to improve.    Please note that this dictation was created using voice recognition software. I have made every reasonable attempt to correct obvious errors, but I expect that there are errors of grammar and possibly content that I did not discover before finalizing the  note.

## 2020-01-21 NOTE — ASSESSMENT & PLAN NOTE
Chronic problem.  The patient is yet to schedule an appoint with cardiology which he had been advised to do at his last appointment.  He has not been seen by them for around 2 years.  He denies any chest pain.

## 2020-02-03 ENCOUNTER — OFFICE VISIT (OUTPATIENT)
Dept: CARDIOLOGY | Facility: MEDICAL CENTER | Age: 75
End: 2020-02-03
Payer: MEDICARE

## 2020-02-03 VITALS
RESPIRATION RATE: 16 BRPM | HEART RATE: 68 BPM | BODY MASS INDEX: 32.48 KG/M2 | WEIGHT: 232 LBS | SYSTOLIC BLOOD PRESSURE: 122 MMHG | OXYGEN SATURATION: 95 % | HEIGHT: 71 IN | DIASTOLIC BLOOD PRESSURE: 82 MMHG

## 2020-02-03 DIAGNOSIS — I10 ESSENTIAL HYPERTENSION: ICD-10-CM

## 2020-02-03 DIAGNOSIS — R94.39 ABNORMAL STRESS TEST: ICD-10-CM

## 2020-02-03 PROCEDURE — 99203 OFFICE O/P NEW LOW 30 MIN: CPT | Performed by: INTERNAL MEDICINE

## 2020-02-04 NOTE — PROGRESS NOTES
"CARDIOLOGY NEW PATIENT CONSULTATION    PCP: Hue Alvarez M.D.    1. Abnormal stress test  False positive stress testing 2017 evidenced by the normal echocardiogram at that time.  Furthermore, the ECG does not have diagnostic Q waves inferiorly.  He is not having symptoms of cardiovascular disease.  I discussed with him the only way to be more definitive would be a coronary angiogram which I do not think is justified at this time.  I recommended continuing statin therapy and do not feel aspirin is indicated.    2. Essential hypertension  Well-controlled.  No changes advised    Follow up with Erik Holland M.D. as needed      Chief Complaint   Patient presents with   • Hypertension       History: Valeriy Orlando is a 74 y.o. male with a past medical history of Abnormal cardiac stress test as well as pseudoclaudication with recent back surgery presenting for consultation regarding abnormal stress test.  In 2017 a stress test showed a small inferior infarct with an ejection fraction of 51%.  This is contrasted against echocardiogram with LVEF of 70%.  He has been in good health since that time without any new cardiac symptoms like angina, shortness of breath or syncope.  He does however have back pain and recently underwent surgery for pseudoclaudication.  He believes that he is improving from the standpoint and hopes to continue to escalate his physical activity.    ROS:  All other systems reviewed and negative except as per the HPI    PE:  /82 (BP Location: Left arm, Patient Position: Sitting, BP Cuff Size: Adult)   Pulse 68   Resp 16   Ht 1.803 m (5' 11\")   Wt 105.2 kg (232 lb)   SpO2 95%   BMI 32.36 kg/m²   GEN: Well appearing  HEENT: Symmetric face. Anicteric sclerae. Moist mucus membranes  NECK: No JVD. No lymphadenopathy  CARDIAC: Normal PMI, regular, normal S1, S2.  VASCULATURE: Normal carotid amplitude without bruit.   RESP: Clear to auscultation bilaterally  ABD: Soft, non-tender, " non-distended  EXT: No edema, no clubbing or cyanosis  SKIN: Warm and dry  NEURO: No gross deficits  PSYCH: Appropriate affect, participates in conversation    Past Medical History:   Diagnosis Date   • Depression     pre diabetes   • Diabetes (HCC)     prediabetic   • Heart burn     GERD   • Hepatitis A 2001   • High cholesterol     patient denies, states medication is for pre-diabetes   • Hypertension     patient denies, states lisinopril is for pre-diabetes   • Murmur    • Prediabetes    • Sleep apnea     cpap in use   • Snoring      Past Surgical History:   Procedure Laterality Date   • LUMBAR LAMINECTOMY DISKECTOMY Bilateral 10/3/2019    Procedure: LAMINECTOMY, SPINE, LUMBAR, WITH DISCECTOMY;  Surgeon: Kyler Juares M.D.;  Location: SURGERY [facility];  Service: Neurosurgery   • LUMBAR FUSION POSTERIOR Bilateral 10/3/2019    Procedure: FUSION, SPINE, LUMBAR, PLIF- REDO L4-5 WITH TLIF;  Surgeon: Kyler Juares M.D.;  Location: SURGERY [facility];  Service: Neurosurgery   • LUMBAR LAMINECTOMY DISKECTOMY  9/4/2018    Procedure: LUMBAR LAMINECTOMY DISKECTOMY-  L2-3 OPEN LAMI AND REDO L3-4 LAMI;  Surgeon: Bentley Claudio M.D.;  Location: SURGERY [facility];  Service: Neurosurgery   • LUMBAR LAMINECTOMY DISKECTOMY  11/29/2016    Procedure: LUMBAR LAMINECTOMY DISKECTOMY L3-5 open laminectomy ;  Surgeon: Bentley Claudio M.D.;  Location: SURGERY [facility];  Service:    • OTHER ORTHOPEDIC SURGERY  1990    right knee scope   • GROIN EXPLORATION      growth   • KNEE ARTHROSCOPY       Allergies   Allergen Reactions   • Ibuprofen      PCP does not want him on this due to kidneys     Outpatient Encounter Medications as of 2/3/2020   Medication Sig Dispense Refill   • senna-docusate (PERICOLACE OR SENOKOT S) 8.6-50 MG Tab Take 1 Tab by mouth. 30 Tab 0   • omeprazole (PRILOSEC) 20 MG delayed-release capsule Take 20 mg by mouth every bedtime.     • Probiotic Product (PROBIOTIC-10) Cap Take 1 Cap  by mouth every day.     • multivitamin (THERAGRAN) Tab Take 1 Tab by mouth every day.     • rosuvastatin (CRESTOR) 10 MG Tab Take 1 Tab by mouth every evening. 90 Tab 1   • lisinopril (PRINIVIL) 10 MG Tab TAKE 1 TABLET BY MOUTH EVERY DAY 90 Tab 3     No facility-administered encounter medications on file as of 2/3/2020.      Social History     Socioeconomic History   • Marital status: Single     Spouse name: Not on file   • Number of children: Not on file   • Years of education: Not on file   • Highest education level: Not on file   Occupational History   • Not on file   Social Needs   • Financial resource strain: Not on file   • Food insecurity:     Worry: Not on file     Inability: Not on file   • Transportation needs:     Medical: Not on file     Non-medical: Not on file   Tobacco Use   • Smoking status: Never Smoker   • Smokeless tobacco: Never Used   Substance and Sexual Activity   • Alcohol use: Yes     Alcohol/week: 0.6 oz     Types: 1 Glasses of wine per week     Frequency: 2-4 times a month     Drinks per session: 1 or 2     Binge frequency: Never   • Drug use: Yes     Comment: Uses CBD oil - last use 9/26/19   • Sexual activity: Yes     Partners: Female   Lifestyle   • Physical activity:     Days per week: Not on file     Minutes per session: Not on file   • Stress: Not on file   Relationships   • Social connections:     Talks on phone: Not on file     Gets together: Not on file     Attends [employer] service: Not on file     Active member of club or organization: Not on file     Attends meetings of clubs or organizations: Not on file     Relationship status: Not on file   • Intimate partner violence:     Fear of current or ex partner: Not on file     Emotionally abused: Not on file     Physically abused: Not on file     Forced sexual activity: Not on file   Other Topics Concern   • Not on file   Social History Narrative   • Not on file         Family History   Problem Relation Age of Onset   • Heart  Disease Mother    • Cancer Mother         lung cancer   • Heart Disease Maternal Grandmother    • Diabetes Paternal Grandfather          Studies  Lab Results   Component Value Date/Time    CHOLSTRLTOT 160 01/23/2019 08:22 AM    LDL 81 01/23/2019 08:22 AM    HDL 72 01/23/2019 08:22 AM    TRIGLYCERIDE 33 01/23/2019 08:22 AM       Lab Results   Component Value Date/Time    SODIUM 137 10/05/2019 02:32 AM    POTASSIUM 4.1 10/05/2019 02:32 AM    CHLORIDE 104 10/05/2019 02:32 AM    CO2 27 10/05/2019 02:32 AM    GLUCOSE 110 (H) 10/05/2019 02:32 AM    BUN 22 10/05/2019 02:32 AM    CREATININE 1.35 10/05/2019 02:32 AM     Lab Results   Component Value Date/Time    ALKPHOSPHAT 60 07/19/2019 08:26 AM    ASTSGOT 16 07/19/2019 08:26 AM    ALTSGPT 20 07/19/2019 08:26 AM    TBILIRUBIN 0.5 07/19/2019 08:26 AM        For this encounter I directly reviewed ECG tracings and medical records I agree with the interpretations in the electronic health record

## 2020-02-10 ENCOUNTER — OFFICE VISIT (OUTPATIENT)
Dept: MEDICAL GROUP | Facility: MEDICAL CENTER | Age: 75
End: 2020-02-10
Payer: MEDICARE

## 2020-02-10 ENCOUNTER — HOSPITAL ENCOUNTER (OUTPATIENT)
Facility: MEDICAL CENTER | Age: 75
End: 2020-02-10
Attending: FAMILY MEDICINE
Payer: MEDICARE

## 2020-02-10 VITALS
WEIGHT: 237.22 LBS | HEIGHT: 72 IN | TEMPERATURE: 98.7 F | DIASTOLIC BLOOD PRESSURE: 70 MMHG | OXYGEN SATURATION: 93 % | SYSTOLIC BLOOD PRESSURE: 104 MMHG | BODY MASS INDEX: 32.13 KG/M2 | HEART RATE: 89 BPM

## 2020-02-10 DIAGNOSIS — R10.31 RIGHT LOWER QUADRANT PAIN: ICD-10-CM

## 2020-02-10 DIAGNOSIS — Z12.5 ENCOUNTER FOR SCREENING FOR MALIGNANT NEOPLASM OF PROSTATE: ICD-10-CM

## 2020-02-10 DIAGNOSIS — R31.9 HEMATURIA, UNSPECIFIED TYPE: ICD-10-CM

## 2020-02-10 LAB
APPEARANCE UR: CLEAR
BILIRUB UR STRIP-MCNC: NEGATIVE MG/DL
COLOR UR AUTO: YELLOW
GLUCOSE UR STRIP.AUTO-MCNC: NEGATIVE MG/DL
KETONES UR STRIP.AUTO-MCNC: NEGATIVE MG/DL
LEUKOCYTE ESTERASE UR QL STRIP.AUTO: NEGATIVE
NITRITE UR QL STRIP.AUTO: NEGATIVE
PH UR STRIP.AUTO: 5 [PH] (ref 5–8)
PROT UR QL STRIP: NEGATIVE MG/DL
RBC UR QL AUTO: NORMAL
SP GR UR STRIP.AUTO: 1.01
UROBILINOGEN UR STRIP-MCNC: 0.2 MG/DL

## 2020-02-10 PROCEDURE — 81002 URINALYSIS NONAUTO W/O SCOPE: CPT | Performed by: FAMILY MEDICINE

## 2020-02-10 PROCEDURE — 99214 OFFICE O/P EST MOD 30 MIN: CPT | Performed by: FAMILY MEDICINE

## 2020-02-10 PROCEDURE — 87086 URINE CULTURE/COLONY COUNT: CPT

## 2020-02-11 ENCOUNTER — HOSPITAL ENCOUNTER (OUTPATIENT)
Dept: LAB | Facility: MEDICAL CENTER | Age: 75
End: 2020-02-11
Attending: FAMILY MEDICINE
Payer: MEDICARE

## 2020-02-11 DIAGNOSIS — Z12.5 ENCOUNTER FOR SCREENING FOR MALIGNANT NEOPLASM OF PROSTATE: ICD-10-CM

## 2020-02-11 DIAGNOSIS — R31.9 HEMATURIA, UNSPECIFIED TYPE: ICD-10-CM

## 2020-02-11 DIAGNOSIS — I10 ESSENTIAL HYPERTENSION: ICD-10-CM

## 2020-02-11 DIAGNOSIS — E78.00 ELEVATED LDL CHOLESTEROL LEVEL: ICD-10-CM

## 2020-02-11 DIAGNOSIS — D64.9 ANEMIA, UNSPECIFIED TYPE: ICD-10-CM

## 2020-02-11 LAB
ERYTHROCYTE [DISTWIDTH] IN BLOOD BY AUTOMATED COUNT: 52.2 FL (ref 35.9–50)
HCT VFR BLD AUTO: 46.9 % (ref 42–52)
HGB BLD-MCNC: 15.7 G/DL (ref 14–18)
MCH RBC QN AUTO: 32.2 PG (ref 27–33)
MCHC RBC AUTO-ENTMCNC: 33.5 G/DL (ref 33.7–35.3)
MCV RBC AUTO: 96.1 FL (ref 81.4–97.8)
PLATELET # BLD AUTO: 226 K/UL (ref 164–446)
PMV BLD AUTO: 9.5 FL (ref 9–12.9)
PSA SERPL-MCNC: 0.99 NG/ML (ref 0–4)
RBC # BLD AUTO: 4.88 M/UL (ref 4.7–6.1)
WBC # BLD AUTO: 5.6 K/UL (ref 4.8–10.8)

## 2020-02-11 PROCEDURE — 84153 ASSAY OF PSA TOTAL: CPT | Mod: GA

## 2020-02-11 PROCEDURE — 80061 LIPID PANEL: CPT

## 2020-02-11 PROCEDURE — 85027 COMPLETE CBC AUTOMATED: CPT

## 2020-02-11 PROCEDURE — 80053 COMPREHEN METABOLIC PANEL: CPT

## 2020-02-11 PROCEDURE — 36415 COLL VENOUS BLD VENIPUNCTURE: CPT | Mod: GA

## 2020-02-11 NOTE — ASSESSMENT & PLAN NOTE
RLQ pain intermittent, improves with a BM.   Intermittent constipation.   No pain with urination  No blood in the urine  Pain is current  The pain comes and goes  Started about 1.5 weeks ago.

## 2020-02-11 NOTE — PROGRESS NOTES
Subjective:     CC: Diagnoses of Right lower quadrant pain, Hematuria, unspecified type, and Encounter for screening for malignant neoplasm of prostate  were pertinent to this visit.    HPI: Patient is a 74 y.o. male established patient who presents today with concern regarding right lower quadrant pain.      Right lower quadrant pain  New problem.  The patient reports RLQ pain intermittent, improves with a BM.  The pain somewhat wraps around his right abdomen going into his left groin.  He does report intermittent constipation.   No pain with urination  No blood in the urine  The patient reports the pain currently although mild.  The pain comes and goes  Started about 1.5 weeks ago.  The patient does have a history of cysts on his kidneys.  Of note, the patient did have back surgery in October.  He continues to recover.  He is currently in physical therapy.  He also reports right-sided lumbar back pain but he does not feel that that is associated with his right lower quadrant pain.    Past Medical History:   Diagnosis Date   • Depression     pre diabetes   • Diabetes (HCC)     prediabetic   • Heart burn     GERD   • Hepatitis A 2001   • High cholesterol     patient denies, states medication is for pre-diabetes   • Hypertension     patient denies, states lisinopril is for pre-diabetes   • Murmur    • Prediabetes    • Sleep apnea     cpap in use   • Snoring        Social History     Tobacco Use   • Smoking status: Never Smoker   • Smokeless tobacco: Never Used   Substance Use Topics   • Alcohol use: Yes     Alcohol/week: 0.6 oz     Types: 1 Glasses of wine per week     Frequency: 2-4 times a month     Drinks per session: 1 or 2     Binge frequency: Never   • Drug use: Yes     Comment: Uses CBD oil - last use 9/26/19       Current Outpatient Medications Ordered in Epic   Medication Sig Dispense Refill   • senna-docusate (PERICOLACE OR SENOKOT S) 8.6-50 MG Tab Take 1 Tab by mouth. 30 Tab 0   • omeprazole (PRILOSEC) 20  MG delayed-release capsule Take 20 mg by mouth every bedtime.     • Probiotic Product (PROBIOTIC-10) Cap Take 1 Cap by mouth every day.     • multivitamin (THERAGRAN) Tab Take 1 Tab by mouth every day.     • rosuvastatin (CRESTOR) 10 MG Tab Take 1 Tab by mouth every evening. 90 Tab 1   • lisinopril (PRINIVIL) 10 MG Tab TAKE 1 TABLET BY MOUTH EVERY DAY 90 Tab 3     No current Epic-ordered facility-administered medications on file.        Allergies:  Ibuprofen    Health Maintenance: Completed    ROS:  Pulm: no sob, no cough  CV: no chest pain, no palpitations      Objective:       Exam:  /70 (BP Location: Left arm, Patient Position: Sitting, BP Cuff Size: Adult long)   Pulse 89   Temp 37.1 °C (98.7 °F) (Temporal)   Ht 1.829 m (6')   Wt 107.6 kg (237 lb 3.4 oz)   SpO2 93%   BMI 32.17 kg/m²  Body mass index is 32.17 kg/m².    General: Normal appearing. No distress.  HEAD: NCAT  EYES: conjunctiva clear, lids without ptosis, pupils equal and reactive to light  EARS: ears normal shape and contour.  MOUTH: normal dentition   Neck:  Normal ROM  Pulmonary: Normal effort. Normal respiratory rate.  Cardiovascular: Well perfused. No LE edema  Abdomen: No tenderness to palpation in the right lower quadrant, no masses or hernias noted.  Neurologic: Grossly normal, no focal deficits  Skin: Warm and dry.  No obvious lesions.  Musculoskeletal: Normal gait and station.   Psych: Normal mood and affect. Alert and oriented x3. Judgment and insight is normal.    Assessment & Plan:     74 y.o. male with the following -     1. Right lower quadrant pain  New problem.  Urine analysis done today which did show trace blood in the urine, otherwise negative.  Given the history of cysts on his kidneys and history of chronic kidney disease order placed for renal ultrasound.  I have also sent a culture of his urine.  The patient also reports that he has symptoms of BPH.  No recent PSA on file.  Plan to follow-up in 2 weeks once labs and  imaging are done.  - URINE CULTURE(NEW); Future  - POCT Urinalysis  - US-RENAL; Future    2. Hematuria, unspecified type  New problem, see above for plan.  - PROSTATE SPECIFIC AG SCREENING; Future  - US-RENAL; Future    3. Encounter for screening for malignant neoplasm of prostate   - PROSTATE SPECIFIC AG SCREENING; Future      Return in about 2 weeks (around 2/24/2020).    Please note that this dictation was created using voice recognition software. I have made every reasonable attempt to correct obvious errors, but I expect that there are errors of grammar and possibly content that I did not discover before finalizing the note.

## 2020-02-12 ENCOUNTER — HOSPITAL ENCOUNTER (OUTPATIENT)
Dept: RADIOLOGY | Facility: MEDICAL CENTER | Age: 75
End: 2020-02-12
Attending: FAMILY MEDICINE
Payer: MEDICARE

## 2020-02-12 DIAGNOSIS — R31.9 HEMATURIA, UNSPECIFIED TYPE: ICD-10-CM

## 2020-02-12 DIAGNOSIS — R10.31 RIGHT LOWER QUADRANT PAIN: ICD-10-CM

## 2020-02-12 LAB
ALBUMIN SERPL BCP-MCNC: 4.6 G/DL (ref 3.2–4.9)
ALBUMIN/GLOB SERPL: 1.7 G/DL
ALP SERPL-CCNC: 71 U/L (ref 30–99)
ALT SERPL-CCNC: 19 U/L (ref 2–50)
ANION GAP SERPL CALC-SCNC: 8 MMOL/L (ref 0–11.9)
AST SERPL-CCNC: 12 U/L (ref 12–45)
BILIRUB SERPL-MCNC: 0.6 MG/DL (ref 0.1–1.5)
BUN SERPL-MCNC: 40 MG/DL (ref 8–22)
CALCIUM SERPL-MCNC: 9.5 MG/DL (ref 8.5–10.5)
CHLORIDE SERPL-SCNC: 108 MMOL/L (ref 96–112)
CHOLEST SERPL-MCNC: 145 MG/DL (ref 100–199)
CO2 SERPL-SCNC: 24 MMOL/L (ref 20–33)
CREAT SERPL-MCNC: 1.41 MG/DL (ref 0.5–1.4)
FASTING STATUS PATIENT QL REPORTED: NORMAL
GLOBULIN SER CALC-MCNC: 2.7 G/DL (ref 1.9–3.5)
GLUCOSE SERPL-MCNC: 114 MG/DL (ref 65–99)
HDLC SERPL-MCNC: 64 MG/DL
LDLC SERPL CALC-MCNC: 74 MG/DL
POTASSIUM SERPL-SCNC: 4.2 MMOL/L (ref 3.6–5.5)
PROT SERPL-MCNC: 7.3 G/DL (ref 6–8.2)
SODIUM SERPL-SCNC: 140 MMOL/L (ref 135–145)
TRIGL SERPL-MCNC: 37 MG/DL (ref 0–149)

## 2020-02-12 PROCEDURE — 76775 US EXAM ABDO BACK WALL LIM: CPT

## 2020-02-13 LAB
BACTERIA UR CULT: NORMAL
SIGNIFICANT IND 70042: NORMAL
SITE SITE: NORMAL
SOURCE SOURCE: NORMAL

## 2020-02-16 DIAGNOSIS — E78.00 ELEVATED LDL CHOLESTEROL LEVEL: ICD-10-CM

## 2020-02-18 RX ORDER — ROSUVASTATIN CALCIUM 10 MG/1
TABLET, COATED ORAL
Qty: 90 TAB | Refills: 1 | Status: SHIPPED | OUTPATIENT
Start: 2020-02-18 | End: 2020-08-07

## 2020-02-21 ENCOUNTER — HOSPITAL ENCOUNTER (OUTPATIENT)
Dept: RADIOLOGY | Facility: MEDICAL CENTER | Age: 75
End: 2020-02-21
Attending: NURSE PRACTITIONER
Payer: MEDICARE

## 2020-02-21 DIAGNOSIS — M43.16 SPONDYLOLISTHESIS OF LUMBAR REGION: ICD-10-CM

## 2020-02-21 PROCEDURE — 72110 X-RAY EXAM L-2 SPINE 4/>VWS: CPT

## 2020-02-25 ENCOUNTER — OFFICE VISIT (OUTPATIENT)
Dept: MEDICAL GROUP | Facility: MEDICAL CENTER | Age: 75
End: 2020-02-25
Payer: MEDICARE

## 2020-02-25 VITALS
DIASTOLIC BLOOD PRESSURE: 62 MMHG | WEIGHT: 237.44 LBS | OXYGEN SATURATION: 93 % | BODY MASS INDEX: 32.16 KG/M2 | HEART RATE: 69 BPM | SYSTOLIC BLOOD PRESSURE: 120 MMHG | TEMPERATURE: 98.9 F | HEIGHT: 72 IN

## 2020-02-25 DIAGNOSIS — R69 ILLNESS: ICD-10-CM

## 2020-02-25 DIAGNOSIS — R31.29 MICROSCOPIC HEMATURIA: ICD-10-CM

## 2020-02-25 DIAGNOSIS — M54.50 ACUTE RIGHT-SIDED LOW BACK PAIN WITHOUT SCIATICA: ICD-10-CM

## 2020-02-25 PROCEDURE — 99214 OFFICE O/P EST MOD 30 MIN: CPT | Performed by: FAMILY MEDICINE

## 2020-02-25 RX ORDER — BENZONATATE 100 MG/1
100 CAPSULE ORAL 3 TIMES DAILY PRN
Qty: 60 CAP | Refills: 0 | Status: SHIPPED | OUTPATIENT
Start: 2020-02-25 | End: 2020-07-23

## 2020-02-26 NOTE — ASSESSMENT & PLAN NOTE
New problem. Started 2 days ago. No fevers. No chills.   + sweats last night  Mostly cough with nasal and head congestion.   No chest pain or SOB.   No sore throat.

## 2020-02-26 NOTE — ASSESSMENT & PLAN NOTE
Better after not working out for a couple days  Using muscle relaxers which is helpful.   No longer wrapping around to the front.   Had back surgery in October. The fused L3 and L4 and opening of the canal.   He has had moderate improvement. Best in the morning but worst as the day goes on.   Doing PT already.

## 2020-02-27 ENCOUNTER — HOSPITAL ENCOUNTER (OUTPATIENT)
Dept: LAB | Facility: MEDICAL CENTER | Age: 75
End: 2020-02-27
Attending: FAMILY MEDICINE
Payer: MEDICARE

## 2020-02-27 DIAGNOSIS — M54.50 ACUTE RIGHT-SIDED LOW BACK PAIN WITHOUT SCIATICA: ICD-10-CM

## 2020-02-27 DIAGNOSIS — R31.29 MICROSCOPIC HEMATURIA: ICD-10-CM

## 2020-02-27 LAB
ANION GAP SERPL CALC-SCNC: 10 MMOL/L (ref 0–11.9)
APPEARANCE UR: CLEAR
BILIRUB UR QL STRIP.AUTO: NEGATIVE
BUN SERPL-MCNC: 31 MG/DL (ref 8–22)
CALCIUM SERPL-MCNC: 9.8 MG/DL (ref 8.5–10.5)
CHLORIDE SERPL-SCNC: 104 MMOL/L (ref 96–112)
CO2 SERPL-SCNC: 23 MMOL/L (ref 20–33)
COLOR UR: YELLOW
CREAT SERPL-MCNC: 1.61 MG/DL (ref 0.5–1.4)
GLUCOSE SERPL-MCNC: 102 MG/DL (ref 65–99)
GLUCOSE UR STRIP.AUTO-MCNC: NEGATIVE MG/DL
KETONES UR STRIP.AUTO-MCNC: NEGATIVE MG/DL
LEUKOCYTE ESTERASE UR QL STRIP.AUTO: NEGATIVE
MICRO URNS: NORMAL
NITRITE UR QL STRIP.AUTO: NEGATIVE
PH UR STRIP.AUTO: 6 [PH] (ref 5–8)
POTASSIUM SERPL-SCNC: 4 MMOL/L (ref 3.6–5.5)
PROT UR QL STRIP: NEGATIVE MG/DL
RBC UR QL AUTO: NEGATIVE
SODIUM SERPL-SCNC: 137 MMOL/L (ref 135–145)
SP GR UR STRIP.AUTO: 1.02
UROBILINOGEN UR STRIP.AUTO-MCNC: 0.2 MG/DL

## 2020-02-27 PROCEDURE — 80048 BASIC METABOLIC PNL TOTAL CA: CPT

## 2020-02-27 PROCEDURE — 36415 COLL VENOUS BLD VENIPUNCTURE: CPT

## 2020-02-27 PROCEDURE — 81003 URINALYSIS AUTO W/O SCOPE: CPT

## 2020-02-27 NOTE — PROGRESS NOTES
Subjective:     CC: Diagnoses of Acute right-sided low back pain without sciatica, Microscopic hematuria, and Illness were pertinent to this visit.    HPI: Patient is a 74 y.o. male established patient who presents today to follow up on right lower abdominal/back pain. Now with illness as well.       Acute right-sided low back pain without sciatica  Chronic problem.  Here for follow-up.  Initially, the patient was reporting pain in the right flank wrapping around into the right groin.  Lab work and imaging were obtained including CBC, CMP, PSA all of which were normal aside from his chronic kidney disease.  Ultrasound of the kidneys was done which did show cysts on the kidneys, unchanged, benign.   The patient states that the pain has been improving since he has stopped working out for a couple days  Using muscle relaxers which is helpful.   No longer wrapping around to the front.   Had back surgery in October. Fused L3 and L4 and opening of the canal.   He has had moderate improvement in his back pain. Best in the morning but worse as the day goes on.   Doing PT already.     Illness  New problem. Started 2 days ago. No fevers. No chills.   + sweats last night  Mostly cough with nasal and head congestion.   No chest pain or SOB.   No sore throat.        Past Medical History:   Diagnosis Date   • Depression     pre diabetes   • Diabetes (HCC)     prediabetic   • Heart burn     GERD   • Hepatitis A 2001   • High cholesterol     patient denies, states medication is for pre-diabetes   • Hypertension     patient denies, states lisinopril is for pre-diabetes   • Murmur    • Prediabetes    • Sleep apnea     cpap in use   • Snoring        Social History     Tobacco Use   • Smoking status: Never Smoker   • Smokeless tobacco: Never Used   Substance Use Topics   • Alcohol use: Yes     Alcohol/week: 0.6 oz     Types: 1 Glasses of wine per week     Frequency: 2-4 times a month     Drinks per session: 1 or 2     Binge frequency:  Never   • Drug use: Yes     Comment: Uses CBD oil - last use 9/26/19       Current Outpatient Medications Ordered in Epic   Medication Sig Dispense Refill   • benzonatate (TESSALON) 100 MG Cap Take 1 Cap by mouth 3 times a day as needed for Cough. 60 Cap 0   • rosuvastatin (CRESTOR) 10 MG Tab TAKE 1 TABLET BY MOUTH EVERY DAY IN THE EVENING 90 Tab 1   • senna-docusate (PERICOLACE OR SENOKOT S) 8.6-50 MG Tab Take 1 Tab by mouth. 30 Tab 0   • omeprazole (PRILOSEC) 20 MG delayed-release capsule Take 20 mg by mouth every bedtime.     • Probiotic Product (PROBIOTIC-10) Cap Take 1 Cap by mouth every day.     • multivitamin (THERAGRAN) Tab Take 1 Tab by mouth every day.     • lisinopril (PRINIVIL) 10 MG Tab TAKE 1 TABLET BY MOUTH EVERY DAY 90 Tab 3     No current Epic-ordered facility-administered medications on file.        Allergies:  Ibuprofen    Health Maintenance: Completed    ROS:  CV: no chest pain, no palpitations  GI: no nausea/vomiting, no diarrhea    Objective:       Exam:  /62 (BP Location: Left arm, Patient Position: Sitting, BP Cuff Size: Adult long)   Pulse 69   Temp 37.2 °C (98.9 °F) (Temporal)   Ht 1.829 m (6')   Wt 107.7 kg (237 lb 7 oz)   SpO2 93%   BMI 32.20 kg/m²  Body mass index is 32.2 kg/m².      General: Normal appearing. No distress.  HEAD: NCAT  EYES: conjunctiva clear, lids without ptosis, pupils equal  and reactive to light  EARS: ears normal shape and contour, canals are clear bilaterally, TMs clear  MOUTH: oropharynx is without erythema, edema or exudates.   Neck: Supple without masses. Thyroid is not enlarged. Normal ROM  Pulmonary: Clear to ausculation.  Normal effort. No rales, ronchi, or wheezing.  Cardiovascular: Regular rate and rhythm, no murmur. No LE edema  Neurologic: Grossly normal, no focal deficits  Lymph: No cervical or supraclavicular lymph nodes are palpable  Skin: Warm and dry.  No obvious lesions.  Musculoskeletal: Normal gait and station.   Psych: Normal mood and  affect. Alert and oriented x3. Judgment and insight is normal.    Labs: 2/11/2020 results reviewed and discussed with the patient, questions answered.    Assessment & Plan:     74 y.o. male with the following -     1. Acute right-sided low back pain without sciatica  Chronic problem, seems to be improving as he is reducing his exercise and using muscle relaxants.  Unlikely to be due to renal etiology as was suspected initially.  Plan to continue with physical therapy.  Plan to follow-up as needed.  - Basic Metabolic Panel; Future    2. Microscopic hematuria  New problem, noted on exam at last visit when evaluating for right CVA pain.  Repeat urinalysis ordered.  He does have known chronic kidney disease.  Repeat BMP ordered.  May consider referral to urology if blood is still present.  - URINALYSIS,CULTURE IF INDICATED; Future    3. Illness  New problem.  Likely viral etiology.  Advised supportive care and discussed return precautions.  Prescription given for Tessalon Perles to help with cough.  No signs or symptoms of bacterial infection on exam.  - benzonatate (TESSALON) 100 MG Cap; Take 1 Cap by mouth 3 times a day as needed for Cough.  Dispense: 60 Cap; Refill: 0      Plan to discuss labs with patient once they have returned.    Please note that this dictation was created using voice recognition software. I have made every reasonable attempt to correct obvious errors, but I expect that there are errors of grammar and possibly content that I did not discover before finalizing the note.

## 2020-02-28 DIAGNOSIS — N17.9 AKI (ACUTE KIDNEY INJURY) (HCC): ICD-10-CM

## 2020-02-29 NOTE — PROGRESS NOTES
Spoke with patient over the phone regarding the his worsening kidney function. Etiology is very unclear.  The patient is currently on Crestor, omeprazole and lisinopril 10 mg.  He has been on these medications for quite some time.  Pressures are well controlled.  Ultrasound of the kidneys did show cysts, thought to be benign.  I have started a referral to nephrology.

## 2020-03-09 ENCOUNTER — OFFICE VISIT (OUTPATIENT)
Dept: NEPHROLOGY | Facility: MEDICAL CENTER | Age: 75
End: 2020-03-09
Payer: MEDICARE

## 2020-03-09 VITALS
SYSTOLIC BLOOD PRESSURE: 116 MMHG | BODY MASS INDEX: 31.83 KG/M2 | OXYGEN SATURATION: 96 % | HEIGHT: 72 IN | TEMPERATURE: 99 F | HEART RATE: 64 BPM | DIASTOLIC BLOOD PRESSURE: 68 MMHG | WEIGHT: 235 LBS | RESPIRATION RATE: 16 BRPM

## 2020-03-09 DIAGNOSIS — R79.89 ELEVATED SERUM CREATININE: ICD-10-CM

## 2020-03-09 DIAGNOSIS — I10 ESSENTIAL HYPERTENSION: ICD-10-CM

## 2020-03-09 DIAGNOSIS — N18.30 STAGE 3 CHRONIC KIDNEY DISEASE: ICD-10-CM

## 2020-03-09 PROCEDURE — 99204 OFFICE O/P NEW MOD 45 MIN: CPT | Performed by: INTERNAL MEDICINE

## 2020-03-09 ASSESSMENT — ENCOUNTER SYMPTOMS
ABDOMINAL PAIN: 1
SHORTNESS OF BREATH: 0
FEVER: 0

## 2020-03-09 ASSESSMENT — FIBROSIS 4 INDEX: FIB4 SCORE: 0.89

## 2020-03-09 NOTE — PROGRESS NOTES
Chief Complaint   Patient presents with   • New Patient   • Chronic Kidney Disease       Requesting Provider: Hue Alvarez M.D.    HPI:  aVleriy Orlando is a 74 y.o. male who presents today to establish nephrology care.     Re: CKD. He says he works out every day. He does cycling and weight lifting every day. He denies history of JIHAN, denies history of kidney stones. He takes Tylenol and denies NSAIDs. He says it's been tougher emptying his bladder lately.     Re: Arthritis. He has had three back surgeries in the last few years. He denies NSAIDs.     Re: DM2, he is pre-diabetic. He manages this with diet and exercise.     Re: HTN, diagnosed 1990's. BP control over the years has been well controlled.         Past Medical History:   Diagnosis Date   • Depression     pre diabetes   • Diabetes (HCC)     prediabetic   • Heart burn     GERD   • Hepatitis A 2001   • High cholesterol     patient denies, states medication is for pre-diabetes   • Hypertension     patient denies, states lisinopril is for pre-diabetes   • Murmur    • Prediabetes    • Sleep apnea     cpap in use   • Snoring        Past Surgical History:   Procedure Laterality Date   • LUMBAR LAMINECTOMY DISKECTOMY Bilateral 10/3/2019    Procedure: LAMINECTOMY, SPINE, LUMBAR, WITH DISCECTOMY;  Surgeon: Kyler Juares M.D.;  Location: [facility];  Service: Neurosurgery   • LUMBAR FUSION POSTERIOR Bilateral 10/3/2019    Procedure: FUSION, SPINE, LUMBAR, PLIF- REDO L4-5 WITH TLIF;  Surgeon: Kyler Juares M.D.;  Location: [facility];  Service: Neurosurgery   • LUMBAR LAMINECTOMY DISKECTOMY  9/4/2018    Procedure: LUMBAR LAMINECTOMY DISKECTOMY-  L2-3 OPEN LAMI AND REDO L3-4 LAMI;  Surgeon: Bentley Claudio M.D.;  Location: [facility];  Service: Neurosurgery   • LUMBAR LAMINECTOMY DISKECTOMY  11/29/2016    Procedure: LUMBAR LAMINECTOMY DISKECTOMY L3-5 open laminectomy ;  Surgeon: Bentley Claudio M.D.;   Location: SURGERY [facility];  Service:    • OTHER ORTHOPEDIC SURGERY  1990    right knee scope   • GROIN EXPLORATION      growth   • KNEE ARTHROSCOPY          Outpatient Encounter Medications as of 3/9/2020   Medication Sig Dispense Refill   • rosuvastatin (CRESTOR) 10 MG Tab TAKE 1 TABLET BY MOUTH EVERY DAY IN THE EVENING 90 Tab 1   • senna-docusate (PERICOLACE OR SENOKOT S) 8.6-50 MG Tab Take 1 Tab by mouth. 30 Tab 0   • omeprazole (PRILOSEC) 20 MG delayed-release capsule Take 20 mg by mouth every bedtime.     • Probiotic Product (PROBIOTIC-10) Cap Take 1 Cap by mouth every day.     • multivitamin (THERAGRAN) Tab Take 1 Tab by mouth every day.     • lisinopril (PRINIVIL) 10 MG Tab TAKE 1 TABLET BY MOUTH EVERY DAY 90 Tab 3   • benzonatate (TESSALON) 100 MG Cap Take 1 Cap by mouth 3 times a day as needed for Cough. (Patient not taking: Reported on 3/9/2020) 60 Cap 0     No facility-administered encounter medications on file as of 3/9/2020.         Allergies   Allergen Reactions   • Ibuprofen      PCP does not want him on this due to kidneys       Social History     Socioeconomic History   • Marital status: Single     Spouse name: Not on file   • Number of children: Not on file   • Years of education: Not on file   • Highest education level: Not on file   Occupational History   • Not on file   Social Needs   • Financial resource strain: Not on file   • Food insecurity     Worry: Not on file     Inability: Not on file   • Transportation needs     Medical: Not on file     Non-medical: Not on file   Tobacco Use   • Smoking status: Never Smoker   • Smokeless tobacco: Never Used   Substance and Sexual Activity   • Alcohol use: Yes     Alcohol/week: 0.6 oz     Types: 1 Glasses of wine per week     Frequency: 2-4 times a month     Drinks per session: 1 or 2     Binge frequency: Never   • Drug use: Yes     Comment: Uses CBD oil - last use 9/26/19   • Sexual activity: Yes     Partners: Female   Lifestyle   • Physical  activity     Days per week: Not on file     Minutes per session: Not on file   • Stress: Not on file   Relationships   • Social connections     Talks on phone: Not on file     Gets together: Not on file     Attends Sikh service: Not on file     Active member of club or organization: Not on file     Attends meetings of clubs or organizations: Not on file     Relationship status: Not on file   • Intimate partner violence     Fear of current or ex partner: Not on file     Emotionally abused: Not on file     Physically abused: Not on file     Forced sexual activity: Not on file   Other Topics Concern   • Not on file   Social History Narrative   • Not on file       Family History   Problem Relation Age of Onset   • Heart Disease Mother    • Cancer Mother         lung cancer   • Heart Disease Maternal Grandmother    • Diabetes Paternal Grandfather    • Kidney Disease Neg Hx        Review of Systems   Constitutional: Negative for fever.   Respiratory: Negative for shortness of breath.    Cardiovascular: Negative for chest pain.   Gastrointestinal: Positive for abdominal pain.   Genitourinary: Negative for dysuria and hematuria.   All other systems reviewed and are negative.      /68 (BP Location: Right arm, Patient Position: Sitting, BP Cuff Size: Adult)   Pulse 64   Temp 37.2 °C (99 °F) (Temporal)   Resp 16   Ht 1.829 m (6')   Wt 106.6 kg (235 lb)   SpO2 96%   BMI 31.87 kg/m²     Physical Exam   Constitutional: He is oriented to person, place, and time and well-developed, well-nourished, and in no distress. No distress.   Slight muscular build   HENT:   Mouth/Throat: Oropharynx is clear and moist. No oropharyngeal exudate.   Eyes: EOM are normal. No scleral icterus.   Neck: Neck supple. No tracheal deviation present.   Cardiovascular: Normal rate, regular rhythm and normal heart sounds.   No murmur heard.  Pulmonary/Chest: Effort normal and breath sounds normal. No stridor. He has no rales.   Abdominal:  Soft. Bowel sounds are normal. There is no abdominal tenderness.   Musculoskeletal: Normal range of motion.         General: No edema.   Neurological: He is alert and oriented to person, place, and time.   Skin: Skin is warm and dry. No rash noted.   Psychiatric: Mood and affect normal.         Labs reviewed.    Recent Labs     07/19/19  0826  10/05/19  0232 02/11/20  0809 02/27/20  1230   ALBUMIN 3.7  --   --  4.6  --    HDL  --   --   --  64  --    TRIGLYCERIDE  --   --   --  37  --    SODIUM 140   < > 137 140 137   POTASSIUM 4.3   < > 4.1 4.2 4.0   CHLORIDE 107   < > 104 108 104   CO2 26   < > 27 24 23   BUN 29*   < > 22 40* 31*   CREATININE 1.35   < > 1.35 1.41* 1.61*    < > = values in this interval not displayed.       Lab Results   Component Value Date/Time    WBC 5.6 02/11/2020 08:09 AM    RBC 4.88 02/11/2020 08:09 AM    HEMOGLOBIN 15.7 02/11/2020 08:09 AM    HEMATOCRIT 46.9 02/11/2020 08:09 AM    MCV 96.1 02/11/2020 08:09 AM    MCH 32.2 02/11/2020 08:09 AM    MCHC 33.5 (L) 02/11/2020 08:09 AM    MPV 9.5 02/11/2020 08:09 AM           URINALYSIS:  Lab Results   Component Value Date/Time    COLORURINE Yellow 02/27/2020 1230    CLARITY Clear 02/27/2020 1230    SPECGRAVITY 1.025 02/27/2020 1230    PHURINE 6.0 02/27/2020 1230    KETONES Negative 02/27/2020 1230    PROTEINURIN Negative 02/27/2020 1230    BILIRUBINUR Negative 02/27/2020 1230    UROBILU 0.2 02/27/2020 1230    NITRITE Negative 02/27/2020 1230    LEUKESTERAS Negative 02/27/2020 1230    OCCULTBLOOD Negative 02/27/2020 1230     UPC  No results found for: TOTPROTUR   Lab Results   Component Value Date/Time    CREATININEU 112.50 09/15/2017 1225         Imaging reviewed  No orders to display         Assessment:  Valeriy Orlando is a 74 y.o. male who presents today to establish nephrology care.     Plan:  1. Stage 3 chronic kidney disease (HCC)  -The patient might have mild chronic kidney disease from hypertension and age-related kidney decline.  However,  I suspect his elevated serum creatinine might be due to his excellent exercise habits.  I recommend checking 24-hour urine creatinine clearance to get a better sense of his kidney function.  Kidney ultrasound in February 2020 showed a small postvoid residual, no kidney stones, no hydronephrosis.    2. Elevated serum creatinine  -As above, I believe the patient's excellent exercise habits leads to his increased serum creatinine.  As above, I recommend checking 24-hour urine creatinine clearance to get a better sense of kidney function.    3. Essential hypertension  -Controlled.  Patient is on an ACE inhibitor.        RTC 6 months.  However, if creatinine clearance shows kidney function is truly near normal, I will offer her the patient to return to clinic only as needed.    Shankar Adams MD  Nephrology

## 2020-03-13 ENCOUNTER — HOSPITAL ENCOUNTER (OUTPATIENT)
Dept: LAB | Facility: MEDICAL CENTER | Age: 75
End: 2020-03-13
Attending: INTERNAL MEDICINE
Payer: MEDICARE

## 2020-03-13 DIAGNOSIS — R79.89 ELEVATED SERUM CREATININE: ICD-10-CM

## 2020-03-13 DIAGNOSIS — N18.30 STAGE 3 CHRONIC KIDNEY DISEASE: ICD-10-CM

## 2020-03-13 LAB
ANION GAP SERPL CALC-SCNC: 9 MMOL/L (ref 7–16)
BUN SERPL-MCNC: 31 MG/DL (ref 8–22)
CALCIUM SERPL-MCNC: 9.6 MG/DL (ref 8.5–10.5)
CHLORIDE SERPL-SCNC: 109 MMOL/L (ref 96–112)
CO2 SERPL-SCNC: 22 MMOL/L (ref 20–33)
CREAT SERPL-MCNC: 1.41 MG/DL (ref 0.5–1.4)
GLUCOSE SERPL-MCNC: 117 MG/DL (ref 65–99)
POTASSIUM SERPL-SCNC: 4.1 MMOL/L (ref 3.6–5.5)
SODIUM SERPL-SCNC: 140 MMOL/L (ref 135–145)

## 2020-03-13 PROCEDURE — 36415 COLL VENOUS BLD VENIPUNCTURE: CPT

## 2020-03-13 PROCEDURE — 82575 CREATININE CLEARANCE TEST: CPT

## 2020-03-13 PROCEDURE — 80048 BASIC METABOLIC PNL TOTAL CA: CPT

## 2020-03-16 LAB
COLLECT DURATION TIME UR: 24 HR
CREAT CL/1.73 SQ M ?TM UR+SERPL-ARVRAT: 66 ML/MIN (ref 97–137)
CREAT SERPL-MCNC: 1.43 MG/DL (ref 0.5–1.4)
CREAT UR-MCNC: 75.9 MG/DL
SPECIMEN VOL UR: 2350 ML
URINE CREATININE EXCRETED 1125: 1784 MG/24 HR

## 2020-03-19 ENCOUNTER — HOSPITAL ENCOUNTER (OUTPATIENT)
Dept: RADIOLOGY | Facility: MEDICAL CENTER | Age: 75
End: 2020-03-19
Attending: NURSE PRACTITIONER
Payer: MEDICARE

## 2020-03-19 DIAGNOSIS — M54.50 LOW BACK PAIN, UNSPECIFIED BACK PAIN LATERALITY, UNSPECIFIED CHRONICITY, UNSPECIFIED WHETHER SCIATICA PRESENT: ICD-10-CM

## 2020-03-19 PROCEDURE — 72148 MRI LUMBAR SPINE W/O DYE: CPT

## 2020-05-18 DIAGNOSIS — I10 ESSENTIAL HYPERTENSION: ICD-10-CM

## 2020-05-18 RX ORDER — LISINOPRIL 10 MG/1
TABLET ORAL
Qty: 90 TAB | Refills: 0 | Status: SHIPPED | OUTPATIENT
Start: 2020-05-18 | End: 2020-08-10

## 2020-06-11 ENCOUNTER — HOSPITAL ENCOUNTER (OUTPATIENT)
Dept: LAB | Facility: MEDICAL CENTER | Age: 75
End: 2020-06-11
Attending: ORTHOPAEDIC SURGERY
Payer: MEDICARE

## 2020-06-11 LAB
BASOPHILS # BLD AUTO: 0.8 % (ref 0–1.8)
BASOPHILS # BLD: 0.05 K/UL (ref 0–0.12)
CALCIUM SERPL-MCNC: 9.1 MG/DL (ref 8.5–10.5)
CRP SERPL HS-MCNC: 0.53 MG/DL (ref 0–0.75)
EOSINOPHIL # BLD AUTO: 0.31 K/UL (ref 0–0.51)
EOSINOPHIL NFR BLD: 4.8 % (ref 0–6.9)
ERYTHROCYTE [DISTWIDTH] IN BLOOD BY AUTOMATED COUNT: 50.2 FL (ref 35.9–50)
HCT VFR BLD AUTO: 45.5 % (ref 42–52)
HGB BLD-MCNC: 14.9 G/DL (ref 14–18)
IMM GRANULOCYTES # BLD AUTO: 0.01 K/UL (ref 0–0.11)
IMM GRANULOCYTES NFR BLD AUTO: 0.2 % (ref 0–0.9)
LYMPHOCYTES # BLD AUTO: 1.73 K/UL (ref 1–4.8)
LYMPHOCYTES NFR BLD: 26.5 % (ref 22–41)
MCH RBC QN AUTO: 31.8 PG (ref 27–33)
MCHC RBC AUTO-ENTMCNC: 32.7 G/DL (ref 33.7–35.3)
MCV RBC AUTO: 97.2 FL (ref 81.4–97.8)
MONOCYTES # BLD AUTO: 0.4 K/UL (ref 0–0.85)
MONOCYTES NFR BLD AUTO: 6.1 % (ref 0–13.4)
NEUTROPHILS # BLD AUTO: 4.02 K/UL (ref 1.82–7.42)
NEUTROPHILS NFR BLD: 61.6 % (ref 44–72)
NRBC # BLD AUTO: 0 K/UL
NRBC BLD-RTO: 0 /100 WBC
PLATELET # BLD AUTO: 231 K/UL (ref 164–446)
PMV BLD AUTO: 9.2 FL (ref 9–12.9)
RBC # BLD AUTO: 4.68 M/UL (ref 4.7–6.1)
RHEUMATOID FACT SER IA-ACNC: <10 IU/ML (ref 0–14)
WBC # BLD AUTO: 6.5 K/UL (ref 4.8–10.8)

## 2020-06-11 PROCEDURE — 85610 PROTHROMBIN TIME: CPT

## 2020-06-11 PROCEDURE — 85613 RUSSELL VIPER VENOM DILUTED: CPT

## 2020-06-11 PROCEDURE — 85652 RBC SED RATE AUTOMATED: CPT

## 2020-06-11 PROCEDURE — 36415 COLL VENOUS BLD VENIPUNCTURE: CPT

## 2020-06-11 PROCEDURE — 86431 RHEUMATOID FACTOR QUANT: CPT

## 2020-06-11 PROCEDURE — 86140 C-REACTIVE PROTEIN: CPT

## 2020-06-11 PROCEDURE — 85025 COMPLETE CBC W/AUTO DIFF WBC: CPT

## 2020-06-11 PROCEDURE — 85520 HEPARIN ASSAY: CPT

## 2020-06-11 PROCEDURE — 82310 ASSAY OF CALCIUM: CPT

## 2020-06-11 PROCEDURE — 86812 HLA TYPING A B OR C: CPT

## 2020-06-11 PROCEDURE — 85730 THROMBOPLASTIN TIME PARTIAL: CPT

## 2020-06-12 ENCOUNTER — OFFICE VISIT (OUTPATIENT)
Dept: MEDICAL GROUP | Facility: MEDICAL CENTER | Age: 75
End: 2020-06-12
Payer: MEDICARE

## 2020-06-12 VITALS
SYSTOLIC BLOOD PRESSURE: 116 MMHG | WEIGHT: 236 LBS | OXYGEN SATURATION: 94 % | BODY MASS INDEX: 31.97 KG/M2 | DIASTOLIC BLOOD PRESSURE: 62 MMHG | HEIGHT: 72 IN | TEMPERATURE: 98.5 F | HEART RATE: 79 BPM

## 2020-06-12 DIAGNOSIS — R10.31 RIGHT GROIN PAIN: ICD-10-CM

## 2020-06-12 LAB
APTT PPP: 31.8 SEC (ref 24.7–36)
ERYTHROCYTE [SEDIMENTATION RATE] IN BLOOD BY WESTERGREN METHOD: 5 MM/HOUR (ref 0–20)
INR PPP: 0.98 (ref 0.87–1.13)
LA PPP-IMP: NORMAL
PROTHROMBIN TIME: 13.2 SEC (ref 12–14.6)
SCREEN DRVVT: 42 SEC (ref 28–48)
UFH PPP CHRO-ACNC: <0.1 U/ML

## 2020-06-12 PROCEDURE — 99213 OFFICE O/P EST LOW 20 MIN: CPT | Performed by: FAMILY MEDICINE

## 2020-06-12 ASSESSMENT — FIBROSIS 4 INDEX: FIB4 SCORE: 0.87

## 2020-06-12 NOTE — ASSESSMENT & PLAN NOTE
Occurs every morning. Makes it difficult to walk. Worst in the morning. Improves with movement. Pain with sitting or standing. No bulge noted.   This started about 1 week ago.   No change in urination.   No pain with urination.   No fevers or chills.   Does feel fatigued sometimes.   No change in bowel movements. Uses metameucil and another fiber supplement.

## 2020-06-14 LAB — HLA-B27 QL FC: NEGATIVE

## 2020-06-15 NOTE — PROGRESS NOTES
Subjective:     CC: The encounter diagnosis was Right groin pain.    HPI: Patient is a 74 y.o. male established patient who presents today with right hip pain.      Right groin pain  New problem. Occurs every morning. Makes it difficult to walk. Worst in the morning. Improves with movement. Pain with sitting or standing. No bulge noted.   This started about 1 week ago.   No change in urination.   No pain with urination.   No fevers or chills.   Does feel fatigued sometimes.   No change in bowel movements. Uses metameucil and another fiber supplement.   Does have chronic right sided low back pain. Currently working with Dr. Frost.   Had back surgery last year with no improvement.       Past Medical History:   Diagnosis Date   • Depression     pre diabetes   • Diabetes (HCC)     prediabetic   • Heart burn     GERD   • Hepatitis A 2001   • High cholesterol     patient denies, states medication is for pre-diabetes   • Hypertension     patient denies, states lisinopril is for pre-diabetes   • Murmur    • Prediabetes    • Sleep apnea     cpap in use   • Snoring        Social History     Tobacco Use   • Smoking status: Never Smoker   • Smokeless tobacco: Never Used   Substance Use Topics   • Alcohol use: Yes     Alcohol/week: 0.6 oz     Types: 1 Glasses of wine per week     Frequency: 2-4 times a month     Drinks per session: 1 or 2     Binge frequency: Never   • Drug use: Yes     Comment: Uses CBD oil - last use 9/26/19       Current Outpatient Medications Ordered in Epic   Medication Sig Dispense Refill   • lisinopril (PRINIVIL) 10 MG Tab TAKE 1 TABLET BY MOUTH EVERY DAY 90 Tab 0   • rosuvastatin (CRESTOR) 10 MG Tab TAKE 1 TABLET BY MOUTH EVERY DAY IN THE EVENING 90 Tab 1   • omeprazole (PRILOSEC) 20 MG delayed-release capsule Take 20 mg by mouth every bedtime.     • Probiotic Product (PROBIOTIC-10) Cap Take 1 Cap by mouth every day.     • multivitamin (THERAGRAN) Tab Take 1 Tab by mouth every day.     • benzonatate  (TESSALON) 100 MG Cap Take 1 Cap by mouth 3 times a day as needed for Cough. (Patient not taking: Reported on 3/9/2020) 60 Cap 0   • senna-docusate (PERICOLACE OR SENOKOT S) 8.6-50 MG Tab Take 1 Tab by mouth. 30 Tab 0     No current Epic-ordered facility-administered medications on file.        Allergies:  Ibuprofen    Health Maintenance: Completed    ROS:  Pulm: no sob, no cough  CV: no chest pain, no palpitations      Objective:       Exam:  /62 (BP Location: Right arm, Patient Position: Sitting, BP Cuff Size: Adult long)   Pulse 79   Temp 36.9 °C (98.5 °F) (Temporal)   Ht 1.829 m (6')   Wt 107 kg (236 lb)   SpO2 94%   BMI 32.01 kg/m²  Body mass index is 32.01 kg/m².    General: Normal appearing. No distress.  HEAD: NCAT  EYES: conjunctiva clear, lids without ptosis, pupils equal and reactive to light  EARS: ears normal shape and contour.  MOUTH: normal dentition   Neck:  Normal ROM  Pulmonary: Normal effort. Normal respiratory rate.  Cardiovascular: Well perfused. No LE edema  Neurologic: Grossly normal, no focal deficits  Skin: Warm and dry.  No obvious lesions.  Musculoskeletal: Normal gait and station. Hip: Full ROM, full strength,No TTP, FADIR test negative. Pain the right low back with movement of the leg/hip.     Psych: Normal mood and affect. Alert and oriented x3. Judgment and insight is normal.    Assessment & Plan:     74 y.o. male with the following -     1. Right groin pain  New problem. I believe this is secondary to his back pain. NOrmal ROM of the hip. No pain with exam in the hip but pain in the right low back with exam. Patient has follow up with Dr. Frost. He will let me know if his pain does not improve.       No follow-ups on file.    Please note that this dictation was created using voice recognition software. I have made every reasonable attempt to correct obvious errors, but I expect that there are errors of grammar and possibly content that I did not discover before  finalizing the note.

## 2020-07-16 ENCOUNTER — OFFICE VISIT (OUTPATIENT)
Dept: MEDICAL GROUP | Facility: MEDICAL CENTER | Age: 75
End: 2020-07-16
Payer: MEDICARE

## 2020-07-16 VITALS
SYSTOLIC BLOOD PRESSURE: 130 MMHG | TEMPERATURE: 98 F | WEIGHT: 234 LBS | OXYGEN SATURATION: 95 % | HEIGHT: 72 IN | DIASTOLIC BLOOD PRESSURE: 74 MMHG | BODY MASS INDEX: 31.69 KG/M2 | HEART RATE: 72 BPM

## 2020-07-16 DIAGNOSIS — N50.819 TESTICULAR PAIN: ICD-10-CM

## 2020-07-16 DIAGNOSIS — R10.31 RIGHT LOWER QUADRANT PAIN: ICD-10-CM

## 2020-07-16 PROCEDURE — 99213 OFFICE O/P EST LOW 20 MIN: CPT | Performed by: FAMILY MEDICINE

## 2020-07-16 ASSESSMENT — FIBROSIS 4 INDEX: FIB4 SCORE: 0.87

## 2020-07-17 NOTE — PROGRESS NOTES
Subjective:     CC: Diagnoses of Right lower quadrant pain and Testicular pain were pertinent to this visit.    HPI: Patient is a 74 y.o. male established patient who presents today to with concern regarding right lower quadrant pain and testicular pain.      Right lower quadrant pain  The patient has been struggling with right lower quadrant pain for the past 3 years.  It is unclear if it is coming from his chronic back pain for which he sees a neurosurgeon, Dr. Frost.  He recently had injections in his back is feeling better.  However, continues to have pain around the right hip and right groin.  In the past he has had CT abdomen pelvis, MR pelvis and ultrasound all of which have been essentially normal.  He is working with a gastroenterologist, getting upper and lower endosc next month.  He does report that he has been having some testicular pain, noted some discoloration of the scrotum earlier today.        Past Medical History:   Diagnosis Date   • Depression     pre diabetes   • Diabetes (HCC)     prediabetic   • Heart burn     GERD   • Hepatitis A 2001   • High cholesterol     patient denies, states medication is for pre-diabetes   • Hypertension     patient denies, states lisinopril is for pre-diabetes   • Murmur    • Prediabetes    • Sleep apnea     cpap in use   • Snoring        Social History     Tobacco Use   • Smoking status: Never Smoker   • Smokeless tobacco: Never Used   Substance Use Topics   • Alcohol use: Yes     Alcohol/week: 0.6 oz     Types: 1 Glasses of wine per week     Frequency: 2-4 times a month     Drinks per session: 1 or 2     Binge frequency: Never   • Drug use: Yes     Comment: Uses CBD oil - last use 9/26/19       Current Outpatient Medications Ordered in Epic   Medication Sig Dispense Refill   • lisinopril (PRINIVIL) 10 MG Tab TAKE 1 TABLET BY MOUTH EVERY DAY 90 Tab 0   • rosuvastatin (CRESTOR) 10 MG Tab TAKE 1 TABLET BY MOUTH EVERY DAY IN THE EVENING 90 Tab 1   • senna-docusate  (PERICOLACE OR SENOKOT S) 8.6-50 MG Tab Take 1 Tab by mouth. 30 Tab 0   • omeprazole (PRILOSEC) 20 MG delayed-release capsule Take 20 mg by mouth every bedtime.     • Probiotic Product (PROBIOTIC-10) Cap Take 1 Cap by mouth every day.     • multivitamin (THERAGRAN) Tab Take 1 Tab by mouth every day.     • benzonatate (TESSALON) 100 MG Cap Take 1 Cap by mouth 3 times a day as needed for Cough. (Patient not taking: Reported on 3/9/2020) 60 Cap 0     No current Epic-ordered facility-administered medications on file.        Allergies:  Ibuprofen    Health Maintenance: Completed    ROS:  Gen: no fevers/chill, no changes in weight  Eyes: no changes in vision  ENT: no sore throat, no hearing loss, no bloody nose  Pulm: no sob, no cough  CV: no chest pain, no palpitations  GI: no nausea/vomiting, no diarrhea  : no dysuria  MSk: no myalgias  Skin: no rash  Neuro: no headaches, no numbness/tingling  Heme/Lymph: no easy bruising      Objective:       Exam:  /74 (BP Location: Right arm, Patient Position: Sitting, BP Cuff Size: Adult long)   Pulse 72   Temp 36.7 °C (98 °F) (Temporal)   Ht 1.829 m (6')   Wt 106.1 kg (234 lb)   SpO2 95%   BMI 31.74 kg/m²  Body mass index is 31.74 kg/m².    General: Normal appearing. No distress.  HEAD: NCAT  EYES: conjunctiva clear, lids without ptosis, pupils equal and reactive to light  EARS: ears normal shape and contour.  MOUTH: normal dentition   Neck:  Normal ROM  Pulmonary: Normal effort. Normal respiratory rate.  Cardiovascular: Well perfused. No LE edema  : Normal inspection of the penis and scrotum.  He does have some tenderness to palpation superior to the right testicle, the epididymis does seem moderately enlarged.  Neurologic: Grossly normal, no focal deficits  Skin: Warm and dry.  No obvious lesions.  Musculoskeletal: Normal gait and station.   Psych: Normal mood and affect. Alert and oriented x3. Judgment and insight is normal.    Imaging: Ultrasound done 2/12/2020,  MR abdomen done 1/3/2018, CT abdomen pelvis done 10/26/2017.  Results reviewed and discussed with the patient, questions answered.    Assessment & Plan:     74 y.o. male with the following -     1. Right lower quadrant pain  Chronic problem, etiology unclear, has had significant work-up.  He is working with gastroenterology as well as a neurosurgeon for his chronic back pain.  Given that he is now having some scrotal discomfort and discoloration I have started a referral to urology.    2. Testicular pain  - REFERRAL TO UROLOGY      No follow-ups on file.    Please note that this dictation was created using voice recognition software. I have made every reasonable attempt to correct obvious errors, but I expect that there are errors of grammar and possibly content that I did not discover before finalizing the note.

## 2020-07-23 ENCOUNTER — OFFICE VISIT (OUTPATIENT)
Dept: MEDICAL GROUP | Facility: MEDICAL CENTER | Age: 75
End: 2020-07-23

## 2020-07-23 ENCOUNTER — APPOINTMENT (OUTPATIENT)
Dept: MEDICAL GROUP | Facility: MEDICAL CENTER | Age: 75
End: 2020-07-23
Payer: MEDICARE

## 2020-07-23 VITALS
DIASTOLIC BLOOD PRESSURE: 70 MMHG | BODY MASS INDEX: 31.77 KG/M2 | HEIGHT: 72 IN | SYSTOLIC BLOOD PRESSURE: 122 MMHG | TEMPERATURE: 97.8 F | RESPIRATION RATE: 16 BRPM | HEART RATE: 68 BPM | WEIGHT: 234.57 LBS | OXYGEN SATURATION: 93 %

## 2020-07-23 DIAGNOSIS — R73.03 PREDIABETES: ICD-10-CM

## 2020-07-23 DIAGNOSIS — N18.30 STAGE 3 CHRONIC KIDNEY DISEASE: ICD-10-CM

## 2020-07-23 DIAGNOSIS — G47.33 OSA (OBSTRUCTIVE SLEEP APNEA): ICD-10-CM

## 2020-07-23 DIAGNOSIS — E78.00 ELEVATED LDL CHOLESTEROL LEVEL: ICD-10-CM

## 2020-07-23 DIAGNOSIS — M54.50 ACUTE RIGHT-SIDED LOW BACK PAIN WITHOUT SCIATICA: ICD-10-CM

## 2020-07-23 DIAGNOSIS — K22.70 BARRETT'S ESOPHAGUS WITHOUT DYSPLASIA: ICD-10-CM

## 2020-07-23 DIAGNOSIS — I10 ESSENTIAL HYPERTENSION: ICD-10-CM

## 2020-07-23 DIAGNOSIS — R94.39 ABNORMAL STRESS TEST: ICD-10-CM

## 2020-07-23 PROBLEM — K21.9 GASTROESOPHAGEAL REFLUX DISEASE WITHOUT ESOPHAGITIS: Status: RESOLVED | Noted: 2017-05-03 | Resolved: 2020-07-23

## 2020-07-23 PROBLEM — R69 ILLNESS: Status: RESOLVED | Noted: 2020-02-25 | Resolved: 2020-07-23

## 2020-07-23 PROBLEM — L03.114 CELLULITIS OF LEFT UPPER EXTREMITY: Status: RESOLVED | Noted: 2019-07-15 | Resolved: 2020-07-23

## 2020-07-23 PROCEDURE — G0439 PPPS, SUBSEQ VISIT: HCPCS | Performed by: FAMILY MEDICINE

## 2020-07-23 ASSESSMENT — PATIENT HEALTH QUESTIONNAIRE - PHQ9
CLINICAL INTERPRETATION OF PHQ2 SCORE: 0
SUM OF ALL RESPONSES TO PHQ QUESTIONS 1-9: 0

## 2020-07-23 ASSESSMENT — ENCOUNTER SYMPTOMS: GENERAL WELL-BEING: GOOD

## 2020-07-23 ASSESSMENT — ACTIVITIES OF DAILY LIVING (ADL): BATHING_REQUIRES_ASSISTANCE: 0

## 2020-07-23 ASSESSMENT — FIBROSIS 4 INDEX: FIB4 SCORE: 0.87

## 2020-07-23 NOTE — ASSESSMENT & PLAN NOTE
Chronic problem. Currently on omeprazole about 3-4 times per week. His last endoscopy about 5 yrs ago. He is scheduled for both upper and lower on Aug 13th. Followed by [facility].

## 2020-07-23 NOTE — PROGRESS NOTES
Chief Complaint   Patient presents with   • Annual Exam         HPI:  Valeriy Orlando is a 74 y.o. here for Medicare Annual Wellness Visit     Abnormal stress test  Advised by cardiology this was a false positive.  No need for further follow-up at this time.    Acute right-sided low back pain without sciatica  Chronic problem, continues to work with Dr. Frost.     King's esophagus without dysplasia  Chronic problem. Currently on omeprazole about 3-4 times per week. His last endoscopy about 5 yrs ago. He is scheduled for both upper and lower scope on Aug 13th. Followed by [facility].     Elevated LDL cholesterol level  Chronic problem. Currently on rosuvastatin.  Last lipid panel done 2/11/2020, LDL 74.    Essential hypertension  Chornic problem. Well controlled with lisinopril 10mg.     MAVERICK (obstructive sleep apnea)  Chornic problem. Copmliant with CPAP. Sees Dr. Mccarthy regularly.    Prediabetes  Chronic problem. Plans to start working hard on diet and exercise.     Stage 3 chronic kidney disease (HCC)  Chronic problem. Last GFR was 49 in Feb 2020.    Patient Active Problem List    Diagnosis Date Noted   • Right groin pain 06/12/2020   • Acute right-sided low back pain without sciatica 02/25/2020   • Right lower quadrant pain 02/10/2020   • King's esophagus without dysplasia 07/15/2019   • Stage 3 chronic kidney disease (HCC) 02/21/2019   • S/P lumbar laminectomy 09/12/2018   • Chronic right-sided low back pain without sciatica 07/06/2018   • Elevated LDL cholesterol level 05/09/2018   • Nonrheumatic aortic valve insufficiency 09/13/2017   • Abnormal EKG 06/06/2017   • Essential hypertension 05/03/2017   • Prediabetes 05/03/2017   • Obesity (BMI 30-39.9) 05/03/2017   • Abnormal stress test 05/03/2017   • MAVERICK (obstructive sleep apnea) 05/03/2017       Current Outpatient Medications   Medication Sig Dispense Refill   • lisinopril (PRINIVIL) 10 MG Tab TAKE 1 TABLET BY MOUTH EVERY DAY 90 Tab 0   • rosuvastatin  (CRESTOR) 10 MG Tab TAKE 1 TABLET BY MOUTH EVERY DAY IN THE EVENING 90 Tab 1   • senna-docusate (PERICOLACE OR SENOKOT S) 8.6-50 MG Tab Take 1 Tab by mouth. 30 Tab 0   • omeprazole (PRILOSEC) 20 MG delayed-release capsule Take 20 mg by mouth every bedtime.     • Probiotic Product (PROBIOTIC-10) Cap Take 1 Cap by mouth every day.     • multivitamin (THERAGRAN) Tab Take 1 Tab by mouth every day.       No current facility-administered medications for this visit.             Current supplements as per medication list.       Allergies: Ibuprofen    Current social contact/activities:      He  reports that he has never smoked. He has never used smokeless tobacco. He reports current alcohol use of about 0.6 oz of alcohol per week. He reports current drug use.  Counseling given: Not Answered      DPA/Advanced Directive:  Patient does not have an Advanced Directive.  A packet and workshop information was given on Advanced Directives.    ROS:    Gait: Uses no assistive device  Ostomy: No  Other tubes: No  Amputations: No  Chronic oxygen use: No  Last eye exam: 5-6 years ago  Wears hearing aids: No   : Denies any urinary leakage during the last 6 months    Screening:    Depression Screening    Little interest or pleasure in doing things?  0 - not at all  Feeling down, depressed , or hopeless? 0 - not at all  Patient Health Questionnaire Score: 0     If depressive symptoms identified deferred to follow up visit unless specifically addressed in assessment and plan.    Interpretation of PHQ-9 Total Score   Score Severity   1-4 No Depression   5-9 Mild Depression   10-14 Moderate Depression   15-19 Moderately Severe Depression   20-27 Severe Depression    Screening for Cognitive Impairment    Three Minute Recall (river, nation, finger) 3/3    Clark clock face with all 12 numbers and set the hands to show 10 past 11.  Yes    Cognitive concerns identified deferred for follow up unless specifically addressed in assessment and  plan.    Fall Risk Assessment    Has the patient had two or more falls in the last year or any fall with injury in the last year?  No    Safety Assessment    Throw rugs on floor.  Yes  Handrails on all stairs.  No  Good lighting in all hallways.  Yes  Difficulty hearing.  No  Patient counseled about all safety risks that were identified.    Functional Assessment ADLs    Are there any barriers preventing you from cooking for yourself or meeting nutritional needs?  No.    Are there any barriers preventing you from driving safely or obtaining transportation?  No.    Are there any barriers preventing you from using a telephone or calling for help?  No.    Are there any barriers preventing you from shopping?  No.    Are there any barriers preventing you from taking care of your own finances?  No.    Are there any barriers preventing you from managing your medications?  No.    Are there any barriers preventing you from showering, bathing or dressing yourself?  No.    Are you currently engaging in any exercise or physical activity?  Yes.  Workout 1.5 hour everyday,bike,weight lifts   What is your perception of your health?  Good.      Health Maintenance Summary                HEPATITIS C SCREENING Overdue 1945     Annual Wellness Visit Overdue 5/10/2019      Done 5/9/2018 Visit Dx: Medicare annual wellness visit, subsequent    IMM ZOSTER VACCINES Postponed 11/12/2020 Originally 12/11/1996. System: vaccine not available, other system reasons    IMM INFLUENZA Next Due 9/1/2020      Done 9/24/2019 Imm Admin: Influenza Vaccine Adult HD     Patient has more history with this topic...    IMM DTaP/Tdap/Td Vaccine Next Due 5/10/2027      Done 5/10/2017 Imm Admin: Tdap Vaccine     Patient has more history with this topic...    COLONOSCOPY Next Due 7/19/2028      Done 7/19/2018 REFERRAL TO GI FOR COLONOSCOPY     Patient has more history with this topic...          Patient Care Team:  Hue Alvarez M.D. as PCP - General  (Family Medicine)        Social History     Tobacco Use   • Smoking status: Never Smoker   • Smokeless tobacco: Never Used   Substance Use Topics   • Alcohol use: Yes     Alcohol/week: 0.6 oz     Types: 1 Glasses of wine per week     Frequency: 2-4 times a month     Drinks per session: 1 or 2     Binge frequency: Never   • Drug use: Yes     Comment: Uses CBD oil - last use 9/26/19     Family History   Problem Relation Age of Onset   • Heart Disease Mother    • Cancer Mother         lung cancer   • Heart Disease Maternal Grandmother    • Diabetes Paternal Grandfather    • Kidney Disease Neg Hx      He  has a past medical history of Depression, Diabetes (HCC), Heart burn, Hepatitis A (2001), High cholesterol, Hypertension, Murmur, Prediabetes, Sleep apnea, and Snoring.   Past Surgical History:   Procedure Laterality Date   • LUMBAR LAMINECTOMY DISKECTOMY Bilateral 10/3/2019    Procedure: LAMINECTOMY, SPINE, LUMBAR, WITH DISCECTOMY;  Surgeon: Kyler Juares M.D.;  Location: [facility];  Service: Neurosurgery   • LUMBAR FUSION POSTERIOR Bilateral 10/3/2019    Procedure: FUSION, SPINE, LUMBAR, PLIF- REDO L4-5 WITH TLIF;  Surgeon: Kyler Juares M.D.;  Location: [facility];  Service: Neurosurgery   • LUMBAR LAMINECTOMY DISKECTOMY  9/4/2018    Procedure: LUMBAR LAMINECTOMY DISKECTOMY-  L2-3 OPEN LAMI AND REDO L3-4 LAMI;  Surgeon: Bentley Claudio M.D.;  Location: [facility];  Service: Neurosurgery   • LUMBAR LAMINECTOMY DISKECTOMY  11/29/2016    Procedure: LUMBAR LAMINECTOMY DISKECTOMY L3-5 open laminectomy ;  Surgeon: Bentley Claudio M.D.;  Location: [facility];  Service:    • OTHER ORTHOPEDIC SURGERY  1990    right knee scope   • GROIN EXPLORATION      growth   • KNEE ARTHROSCOPY         Exam:   /70 (BP Location: Left arm, Patient Position: Sitting)   Pulse 68   Temp 36.6 °C (97.8 °F) (Temporal)   Resp 16   Ht 1.829 m (6')   Wt 106.4 kg (234 lb 9.1  oz)   SpO2 93%  Body mass index is 31.81 kg/m².    Hearing good.    Dentition good  Alert, oriented in no acute distress.  Eye contact is good, speech goal directed, affect calm    Assessment and Plan. The following treatment and monitoring plan is recommended:    1. Abnormal stress test     2. Acute right-sided low back pain without sciatica     3. King's esophagus without dysplasia     4. Elevated LDL cholesterol level     5. Essential hypertension     6. MAVERICK (obstructive sleep apnea)     7. Prediabetes     8. Stage 3 chronic kidney disease (HCC)           Services suggested: No services needed at this time  Health Care Screening: Age-appropriate preventive services recommended by USPTF and ACIP covered by Medicare were discussed today. Services ordered if indicated and agreed upon by the patient.  Referrals offered: Community-based lifestyle interventions to reduce health risks and promote self-management and wellness, fall prevention, nutrition, physical activity, tobacco-use cessation, weight loss, and mental health services as per orders if indicated.    Discussion today about general wellness and lifestyle habits:    · Prevent falls and reduce trip hazards; Cautioned about securing or removing rugs.  · Have a working fire alarm and carbon monoxide detector;   · Engage in regular physical activity and social activities     Follow-up: No follow-ups on file.

## 2020-08-07 DIAGNOSIS — E78.00 ELEVATED LDL CHOLESTEROL LEVEL: ICD-10-CM

## 2020-08-07 RX ORDER — ROSUVASTATIN CALCIUM 10 MG/1
TABLET, COATED ORAL
Qty: 90 TAB | Refills: 1 | Status: SHIPPED | OUTPATIENT
Start: 2020-08-07 | End: 2021-02-01

## 2020-08-10 DIAGNOSIS — I10 ESSENTIAL HYPERTENSION: ICD-10-CM

## 2020-08-10 RX ORDER — LISINOPRIL 10 MG/1
TABLET ORAL
Qty: 90 TAB | Refills: 0 | Status: SHIPPED | OUTPATIENT
Start: 2020-08-10 | End: 2020-11-02

## 2020-09-17 RX ORDER — OMEPRAZOLE 20 MG/1
CAPSULE, DELAYED RELEASE ORAL
Qty: 90 CAP | Refills: 3 | Status: SHIPPED | OUTPATIENT
Start: 2020-09-17 | End: 2021-09-09

## 2020-10-19 ENCOUNTER — OFFICE VISIT (OUTPATIENT)
Dept: MEDICAL GROUP | Facility: MEDICAL CENTER | Age: 75
End: 2020-10-19
Payer: MEDICARE

## 2020-10-19 VITALS
TEMPERATURE: 97.9 F | DIASTOLIC BLOOD PRESSURE: 76 MMHG | SYSTOLIC BLOOD PRESSURE: 128 MMHG | OXYGEN SATURATION: 95 % | HEART RATE: 75 BPM | BODY MASS INDEX: 30.75 KG/M2 | WEIGHT: 227 LBS | HEIGHT: 72 IN

## 2020-10-19 DIAGNOSIS — M54.50 CHRONIC RIGHT-SIDED LOW BACK PAIN WITHOUT SCIATICA: ICD-10-CM

## 2020-10-19 DIAGNOSIS — R73.03 PREDIABETES: ICD-10-CM

## 2020-10-19 DIAGNOSIS — E66.9 OBESITY (BMI 30-39.9): ICD-10-CM

## 2020-10-19 DIAGNOSIS — Z23 NEED FOR VACCINATION: ICD-10-CM

## 2020-10-19 DIAGNOSIS — G89.29 CHRONIC RIGHT-SIDED LOW BACK PAIN WITHOUT SCIATICA: ICD-10-CM

## 2020-10-19 DIAGNOSIS — I10 ESSENTIAL HYPERTENSION: ICD-10-CM

## 2020-10-19 DIAGNOSIS — E78.00 ELEVATED LDL CHOLESTEROL LEVEL: ICD-10-CM

## 2020-10-19 LAB
HBA1C MFR BLD: 6 % (ref 0–5.6)
INT CON NEG: NEGATIVE
INT CON POS: POSITIVE

## 2020-10-19 PROCEDURE — G0008 ADMIN INFLUENZA VIRUS VAC: HCPCS | Performed by: FAMILY MEDICINE

## 2020-10-19 PROCEDURE — 99214 OFFICE O/P EST MOD 30 MIN: CPT | Mod: 25 | Performed by: FAMILY MEDICINE

## 2020-10-19 PROCEDURE — 90662 IIV NO PRSV INCREASED AG IM: CPT | Performed by: FAMILY MEDICINE

## 2020-10-19 PROCEDURE — 83036 HEMOGLOBIN GLYCOSYLATED A1C: CPT | Performed by: FAMILY MEDICINE

## 2020-10-19 ASSESSMENT — FIBROSIS 4 INDEX: FIB4 SCORE: 0.87

## 2020-10-20 NOTE — PROGRESS NOTES
Subjective:     CC: Diagnoses of Essential hypertension, Chronic right-sided low back pain without sciatica, Obesity (BMI 30-39.9), Prediabetes, Elevated LDL cholesterol level, and Need for vaccination were pertinent to this visit.    HPI: Patient is a 74 y.o. male established patient who presents today to have DMV paperwork filled out and discussed following.      Essential hypertension  Chronic problem. Well controlled on lisinopril 10 mg.    Chronic right-sided low back pain without sciatica  Chronic problem. Has done some dietary changes, notes some improvement with this pain since these dietary changes.     Prediabetes  Chronic problem.  A1c is 6.0% today.  The patient does report a change in diet, reducing processed foods as well as carbohydrates.      Past Medical History:   Diagnosis Date   • Depression     pre diabetes   • Diabetes (HCC)     prediabetic   • Heart burn     GERD   • Hepatitis A 2001   • High cholesterol     patient denies, states medication is for pre-diabetes   • Hypertension     patient denies, states lisinopril is for pre-diabetes   • Murmur    • Prediabetes    • Sleep apnea     cpap in use   • Snoring        Social History     Tobacco Use   • Smoking status: Never Smoker   • Smokeless tobacco: Never Used   Substance Use Topics   • Alcohol use: Yes     Alcohol/week: 0.6 oz     Types: 1 Glasses of wine per week     Frequency: 2-4 times a month     Drinks per session: 1 or 2     Binge frequency: Never   • Drug use: Yes     Comment: Uses CBD oil - last use 9/26/19       Current Outpatient Medications Ordered in Epic   Medication Sig Dispense Refill   • omeprazole (PRILOSEC) 20 MG delayed-release capsule TAKE 1 CAP BY MOUTH AS NEEDED DAILY FOR HEARTBURN 90 Cap 3   • lisinopril (PRINIVIL) 10 MG Tab TAKE 1 TABLET BY MOUTH EVERY DAY 90 Tab 0   • rosuvastatin (CRESTOR) 10 MG Tab TAKE 1 TABLET BY MOUTH EVERY DAY IN THE EVENING 90 Tab 1   • senna-docusate (PERICOLACE OR SENOKOT S) 8.6-50 MG Tab Take  1 Tab by mouth. 30 Tab 0   • Probiotic Product (PROBIOTIC-10) Cap Take 1 Cap by mouth every day.     • multivitamin (THERAGRAN) Tab Take 1 Tab by mouth every day.       No current [facility]-ordered facility-administered medications on file.        Allergies:  Ibuprofen    Health Maintenance: Completed    ROS:  Gen: no fevers/chill, no changes in weight  Eyes: no changes in vision  ENT: no sore throat, no hearing loss, no bloody nose  Pulm: no sob, no cough  CV: no chest pain, no palpitations  GI: no nausea/vomiting, no diarrhea  : no dysuria  MSk: no myalgias  Skin: no rash  Neuro: no headaches, no numbness/tingling  Heme/Lymph: no easy bruising      Objective:       Exam:  /76 (BP Location: Right arm, Patient Position: Sitting, BP Cuff Size: Adult long)   Pulse 75   Temp 36.6 °C (97.9 °F) (Temporal)   Ht 1.829 m (6')   Wt 103 kg (227 lb)   SpO2 95%   BMI 30.79 kg/m²  Body mass index is 30.79 kg/m².    General: Normal appearing. No distress.  HEAD: NCAT  EYES: conjunctiva clear, lids without ptosis, pupils equal and reactive to light  EARS: ears normal shape and contour.  MOUTH: normal dentition   Neck:  Normal ROM  Pulmonary: Normal effort. Normal respiratory rate.  Cardiovascular: Well perfused. No LE edema  Neurologic: Grossly normal, no focal deficits  Skin: Warm and dry.  No obvious lesions.  Musculoskeletal: Normal gait and station.   Psych: Normal mood and affect. Alert and oriented x3. Judgment and insight is normal.      Labs: 6/11/20 Results reviewed and discussed with the patient, questions answered.    Assessment & Plan:     74 y.o. male with the following -     1. Essential hypertension  Chronic problem, well-controlled.  Metabolic panel ordered for February.  - Comp Metabolic Panel; Future    2. Chronic right-sided low back pain without sciatica  Chronic problem, improving with dietary changes.  Continues to have pain in the same spot.  Has had multiple injections at this point.  He is also  had significant imaging done.  Plan to continue to monitor.    3. Obesity (BMI 30-39.9)  - Lipid Profile; Future    4. Prediabetes  Chronic problem, A1c 6.0% today.  Patient has started some significant dietary changes a couple weeks ago.  Encouraged him to continue this.  - POCT Hemoglobin A1C    5. Elevated LDL cholesterol level  - Lipid Profile; Future    6. Need for vaccination  - Influenza Vaccine, High Dose (65+ Only)    Return in about 3 months (around 1/19/2021), or if symptoms worsen or fail to improve.    Please note that this dictation was created using voice recognition software. I have made every reasonable attempt to correct obvious errors, but I expect that there are errors of grammar and possibly content that I did not discover before finalizing the note.

## 2020-10-20 NOTE — ASSESSMENT & PLAN NOTE
Chronic problem.  A1c is 6.0% today.  The patient does report a change in diet, reducing processed foods as well as carbohydrates.

## 2020-11-01 DIAGNOSIS — I10 ESSENTIAL HYPERTENSION: ICD-10-CM

## 2020-11-02 RX ORDER — LISINOPRIL 10 MG/1
TABLET ORAL
Qty: 90 TAB | Refills: 0 | Status: SHIPPED | OUTPATIENT
Start: 2020-11-02 | End: 2021-01-29 | Stop reason: SDUPTHER

## 2020-11-11 ENCOUNTER — HOSPITAL ENCOUNTER (OUTPATIENT)
Dept: LAB | Facility: MEDICAL CENTER | Age: 75
End: 2020-11-11
Attending: NURSE PRACTITIONER
Payer: MEDICARE

## 2020-11-11 LAB
ALBUMIN SERPL BCP-MCNC: 4.3 G/DL (ref 3.2–4.9)
ALBUMIN/GLOB SERPL: 1.6 G/DL
ALP SERPL-CCNC: 74 U/L (ref 30–99)
ALT SERPL-CCNC: 25 U/L (ref 2–50)
ANION GAP SERPL CALC-SCNC: 9 MMOL/L (ref 7–16)
AST SERPL-CCNC: 11 U/L (ref 12–45)
BILIRUB SERPL-MCNC: 0.5 MG/DL (ref 0.1–1.5)
BUN SERPL-MCNC: 28 MG/DL (ref 8–22)
CALCIUM SERPL-MCNC: 9.9 MG/DL (ref 8.5–10.5)
CHLORIDE SERPL-SCNC: 104 MMOL/L (ref 96–112)
CO2 SERPL-SCNC: 25 MMOL/L (ref 20–33)
CREAT SERPL-MCNC: 1.33 MG/DL (ref 0.5–1.4)
FASTING STATUS PATIENT QL REPORTED: NORMAL
GLOBULIN SER CALC-MCNC: 2.7 G/DL (ref 1.9–3.5)
GLUCOSE SERPL-MCNC: 117 MG/DL (ref 65–99)
POTASSIUM SERPL-SCNC: 4.2 MMOL/L (ref 3.6–5.5)
PROT SERPL-MCNC: 7 G/DL (ref 6–8.2)
SODIUM SERPL-SCNC: 138 MMOL/L (ref 135–145)

## 2020-11-11 PROCEDURE — 36415 COLL VENOUS BLD VENIPUNCTURE: CPT

## 2020-11-11 PROCEDURE — 80053 COMPREHEN METABOLIC PANEL: CPT

## 2021-01-05 ENCOUNTER — OFFICE VISIT (OUTPATIENT)
Dept: MEDICAL GROUP | Facility: MEDICAL CENTER | Age: 76
End: 2021-01-05
Payer: MEDICARE

## 2021-01-05 VITALS
BODY MASS INDEX: 31.42 KG/M2 | HEIGHT: 72 IN | DIASTOLIC BLOOD PRESSURE: 80 MMHG | OXYGEN SATURATION: 93 % | HEART RATE: 71 BPM | TEMPERATURE: 97.4 F | WEIGHT: 232 LBS | SYSTOLIC BLOOD PRESSURE: 124 MMHG

## 2021-01-05 DIAGNOSIS — M79.601 RIGHT ARM PAIN: ICD-10-CM

## 2021-01-05 PROCEDURE — 99214 OFFICE O/P EST MOD 30 MIN: CPT | Performed by: FAMILY MEDICINE

## 2021-01-05 RX ORDER — POLYETHYLENE GLYCOL 3350 17 G/17G
17 POWDER, FOR SOLUTION ORAL DAILY
COMMUNITY
End: 2024-01-08

## 2021-01-05 RX ORDER — TRAZODONE HYDROCHLORIDE 50 MG/1
TABLET ORAL
COMMUNITY
Start: 2020-10-12 | End: 2021-04-26

## 2021-01-05 RX ORDER — OXYCODONE HYDROCHLORIDE 5 MG/1
TABLET ORAL
COMMUNITY
End: 2021-07-13

## 2021-01-05 RX ORDER — SODIUM, POTASSIUM,MAG SULFATES 17.5-3.13G
SOLUTION, RECONSTITUTED, ORAL ORAL
COMMUNITY
Start: 2020-11-20 | End: 2021-04-26

## 2021-01-05 RX ORDER — TIZANIDINE 4 MG/1
4 TABLET ORAL
Qty: 15 TAB | Refills: 1 | Status: SHIPPED | OUTPATIENT
Start: 2021-01-05 | End: 2021-07-13

## 2021-01-05 RX ORDER — DOXYCYCLINE HYCLATE 100 MG/1
CAPSULE ORAL
COMMUNITY
Start: 2020-12-18 | End: 2021-07-13

## 2021-01-05 ASSESSMENT — FIBROSIS 4 INDEX: FIB4 SCORE: 0.7

## 2021-01-05 ASSESSMENT — PATIENT HEALTH QUESTIONNAIRE - PHQ9: CLINICAL INTERPRETATION OF PHQ2 SCORE: 0

## 2021-01-05 NOTE — PROGRESS NOTES
Subjective:     CC: The encounter diagnosis was Right arm pain.    HPI: Patient is a 75 y.o. male established patient who presents today with arm pain.       Right arm pain  Starts at the posterior shoulder, radiates down into the elbow. Moderate.   The pain is an aching sensation  Pain with certain positioning. Worst at night. Difficult to sleep.   No pain in office currently  No injury, no falls.   Started about 1 week ago.       Past Medical History:   Diagnosis Date   • Depression     pre diabetes   • Diabetes (HCC)     prediabetic   • Heart burn     GERD   • Hepatitis A 2001   • High cholesterol     patient denies, states medication is for pre-diabetes   • Hypertension     patient denies, states lisinopril is for pre-diabetes   • Murmur    • Prediabetes    • Sleep apnea     cpap in use   • Snoring        Social History     Tobacco Use   • Smoking status: Never Smoker   • Smokeless tobacco: Never Used   Substance Use Topics   • Alcohol use: Yes     Alcohol/week: 0.6 oz     Types: 1 Glasses of wine per week     Frequency: 2-4 times a month     Drinks per session: 1 or 2     Binge frequency: Never   • Drug use: Yes     Comment: Uses CBD oil - last use 9/26/19       Current Outpatient Medications Ordered in Epic   Medication Sig Dispense Refill   • diclofenac sodium 1 % Gel diclofenac 1 % topical gel     • SUPREP BOWEL PREP KIT 17.5-3.13-1.6 GM/177ML Solution USE PER THE FOLLOWING  FOLLOW INSTRUCTIONS THAT WERE MAILED.     • oxyCODONE immediate-release (ROXICODONE) 5 MG Tab oxycodone 5 mg tablet     • polyethylene glycol/lytes (MIRALAX) 17 g Pack Take 17 g by mouth every day.     • tizanidine (ZANAFLEX) 4 MG Tab Take 1 Tab by mouth at bedtime as needed. 15 Tab 1   • lisinopril (PRINIVIL) 10 MG Tab TAKE 1 TABLET BY MOUTH EVERY DAY 90 Tab 0   • omeprazole (PRILOSEC) 20 MG delayed-release capsule TAKE 1 CAP BY MOUTH AS NEEDED DAILY FOR HEARTBURN 90 Cap 3   • rosuvastatin (CRESTOR) 10 MG Tab TAKE 1 TABLET BY MOUTH  EVERY DAY IN THE EVENING 90 Tab 1   • Probiotic Product (PROBIOTIC-10) Cap Take 1 Cap by mouth every day.     • multivitamin (THERAGRAN) Tab Take 1 Tab by mouth every day.     • doxycycline (VIBRAMYCIN) 100 MG Cap PLEASE SEE ATTACHED FOR DETAILED DIRECTIONS     • traZODone (DESYREL) 50 MG Tab TAKE 1/2 TO 1 TAB BY MOUTH AT BEDTIME AS NEEDED FOR INSOMNIA     • senna-docusate (PERICOLACE OR SENOKOT S) 8.6-50 MG Tab Take 1 Tab by mouth. 30 Tab 0     No current Epic-ordered facility-administered medications on file.        Allergies:  Ibuprofen    Health Maintenance: Completed    ROS:  Pulm: no sob, no cough  CV: no chest pain, no palpitations        Objective:       Exam:  /80 (BP Location: Right arm, Patient Position: Sitting, BP Cuff Size: Adult long)   Pulse 71   Temp 36.3 °C (97.4 °F) (Temporal)   Ht 1.829 m (6')   Wt 105.2 kg (232 lb)   SpO2 93%   BMI 31.46 kg/m²  Body mass index is 31.46 kg/m².    General: Normal appearing. No distress.  HEAD: NCAT  EYES: conjunctiva clear, lids without ptosis, pupils equal and reactive to light  EARS: ears normal shape and contour.  MOUTH: normal dentition   Neck:  Normal ROM  Pulmonary: Normal effort. Normal respiratory rate.  Cardiovascular: Well perfused. No LE edema  Neurologic: Grossly normal, no focal deficits  Skin: Warm and dry.  No obvious lesions.  Musculoskeletal: Normal gait and station.  Shoulder: Full ROM, full strength, empty can test +, drop arm test neg, Hawkin's +.  Elbow: No deformity, swelling or bruising noted. No tenderness to palpation. Full range of motion bilaterally. 2+ biceps, triceps and brachioradialis deep tendon reflexes bilaterally. 5/5 strength bilaterally. Aching pain after shoulder testing.   Psych: Normal mood and affect. Alert and oriented x3. Judgment and insight is normal.    Labs: 11/11/20 Results reviewed and discussed with the patient, questions answered.    Assessment & Plan:     75 y.o. male with the following -     1. Right  arm pain  New problem. Etiology unclear. Rx given for zanaflex to help with sleep. Order placed for shoulder x-ray. Plan to f/u once x-ray is done. Exam is non-diagnostic.     - tizanidine (ZANAFLEX) 4 MG Tab; Take 1 Tab by mouth at bedtime as needed.  Dispense: 15 Tab; Refill: 1  - DX-SHOULDER 2+ RIGHT; Future      Return if symptoms worsen or fail to improve.    Please note that this dictation was created using voice recognition software. I have made every reasonable attempt to correct obvious errors, but I expect that there are errors of grammar and possibly content that I did not discover before finalizing the note.

## 2021-01-05 NOTE — ASSESSMENT & PLAN NOTE
Starts at the shoulder, radiates down into the elbow. Moderate.   The pain is an aching sensation  Pain with certain positioning  No pain in office curruently  No injury, no falls.   Started about 1 week ago.

## 2021-01-06 ENCOUNTER — HOSPITAL ENCOUNTER (OUTPATIENT)
Dept: RADIOLOGY | Facility: MEDICAL CENTER | Age: 76
End: 2021-01-06
Attending: FAMILY MEDICINE
Payer: MEDICARE

## 2021-01-06 DIAGNOSIS — M79.601 RIGHT ARM PAIN: ICD-10-CM

## 2021-01-06 PROCEDURE — 73030 X-RAY EXAM OF SHOULDER: CPT | Mod: RT

## 2021-01-07 ENCOUNTER — HOSPITAL ENCOUNTER (OUTPATIENT)
Dept: LAB | Facility: MEDICAL CENTER | Age: 76
End: 2021-01-07
Attending: FAMILY MEDICINE
Payer: MEDICARE

## 2021-01-07 DIAGNOSIS — I10 ESSENTIAL HYPERTENSION: ICD-10-CM

## 2021-01-07 DIAGNOSIS — E78.00 ELEVATED LDL CHOLESTEROL LEVEL: ICD-10-CM

## 2021-01-07 DIAGNOSIS — E66.9 OBESITY (BMI 30-39.9): ICD-10-CM

## 2021-01-07 LAB
ALBUMIN SERPL BCP-MCNC: 4.5 G/DL (ref 3.2–4.9)
ALBUMIN/GLOB SERPL: 1.6 G/DL
ALP SERPL-CCNC: 81 U/L (ref 30–99)
ALT SERPL-CCNC: 18 U/L (ref 2–50)
ANION GAP SERPL CALC-SCNC: 9 MMOL/L (ref 7–16)
AST SERPL-CCNC: 12 U/L (ref 12–45)
BILIRUB SERPL-MCNC: 0.4 MG/DL (ref 0.1–1.5)
BUN SERPL-MCNC: 28 MG/DL (ref 8–22)
CALCIUM SERPL-MCNC: 9.7 MG/DL (ref 8.5–10.5)
CHLORIDE SERPL-SCNC: 104 MMOL/L (ref 96–112)
CHOLEST SERPL-MCNC: 161 MG/DL (ref 100–199)
CO2 SERPL-SCNC: 24 MMOL/L (ref 20–33)
CREAT SERPL-MCNC: 1.37 MG/DL (ref 0.5–1.4)
GLOBULIN SER CALC-MCNC: 2.9 G/DL (ref 1.9–3.5)
GLUCOSE SERPL-MCNC: 132 MG/DL (ref 65–99)
HDLC SERPL-MCNC: 76 MG/DL
LDLC SERPL CALC-MCNC: 78 MG/DL
POTASSIUM SERPL-SCNC: 4.7 MMOL/L (ref 3.6–5.5)
PROT SERPL-MCNC: 7.4 G/DL (ref 6–8.2)
SODIUM SERPL-SCNC: 137 MMOL/L (ref 135–145)
TRIGL SERPL-MCNC: 37 MG/DL (ref 0–149)

## 2021-01-07 PROCEDURE — 80061 LIPID PANEL: CPT

## 2021-01-07 PROCEDURE — 80053 COMPREHEN METABOLIC PANEL: CPT

## 2021-01-07 PROCEDURE — 36415 COLL VENOUS BLD VENIPUNCTURE: CPT

## 2021-01-12 DIAGNOSIS — M79.601 RIGHT ARM PAIN: ICD-10-CM

## 2021-01-13 RX ORDER — TIZANIDINE 4 MG/1
TABLET ORAL
Qty: 90 TAB | Refills: 0 | OUTPATIENT
Start: 2021-01-13

## 2021-01-15 ENCOUNTER — OFFICE VISIT (OUTPATIENT)
Dept: MEDICAL GROUP | Facility: MEDICAL CENTER | Age: 76
End: 2021-01-15
Payer: MEDICARE

## 2021-01-15 VITALS
TEMPERATURE: 98.4 F | DIASTOLIC BLOOD PRESSURE: 80 MMHG | WEIGHT: 232 LBS | SYSTOLIC BLOOD PRESSURE: 120 MMHG | HEART RATE: 80 BPM | HEIGHT: 72 IN | BODY MASS INDEX: 31.42 KG/M2 | OXYGEN SATURATION: 94 %

## 2021-01-15 DIAGNOSIS — R73.03 PREDIABETES: ICD-10-CM

## 2021-01-15 DIAGNOSIS — I10 ESSENTIAL HYPERTENSION: ICD-10-CM

## 2021-01-15 DIAGNOSIS — R94.39 ABNORMAL STRESS TEST: ICD-10-CM

## 2021-01-15 DIAGNOSIS — K22.70 BARRETT'S ESOPHAGUS WITHOUT DYSPLASIA: ICD-10-CM

## 2021-01-15 DIAGNOSIS — G47.33 OSA (OBSTRUCTIVE SLEEP APNEA): ICD-10-CM

## 2021-01-15 DIAGNOSIS — M54.50 ACUTE RIGHT-SIDED LOW BACK PAIN WITHOUT SCIATICA: ICD-10-CM

## 2021-01-15 DIAGNOSIS — Z23 NEED FOR VACCINATION: ICD-10-CM

## 2021-01-15 DIAGNOSIS — M79.601 RIGHT ARM PAIN: ICD-10-CM

## 2021-01-15 LAB
HBA1C MFR BLD: 5.9 % (ref 0–5.6)
INT CON NEG: NEGATIVE
INT CON POS: POSITIVE

## 2021-01-15 PROCEDURE — 83036 HEMOGLOBIN GLYCOSYLATED A1C: CPT | Performed by: FAMILY MEDICINE

## 2021-01-15 PROCEDURE — 99214 OFFICE O/P EST MOD 30 MIN: CPT | Performed by: FAMILY MEDICINE

## 2021-01-15 RX ORDER — GABAPENTIN 300 MG/1
CAPSULE ORAL
COMMUNITY
Start: 2021-01-12 | End: 2021-04-26

## 2021-01-15 ASSESSMENT — FIBROSIS 4 INDEX: FIB4 SCORE: 0.91

## 2021-01-15 NOTE — PROGRESS NOTES
Subjective:     CC: Diagnoses of Prediabetes, Abnormal stress test, Acute right-sided low back pain without sciatica, King's esophagus without dysplasia, Essential hypertension, MAVERICK (obstructive sleep apnea), and Right arm pain were pertinent to this visit.    HPI: Patient is a 75 y.o. male established patient who presents today for general 6-month follow-up and follow-up on right shoulder pain.    Abnormal stress test  Advised by cardiology this was a false positive.  No need for further follow-up at this time.  Denies any chest pain or shortness of breath.  Active.     Acute right-sided low back pain without sciatica  Chronic problem, continues to work with Dr. Frost.   Continues to have severe pain.  Follow-up with pain management as well.     King's esophagus without dysplasia  Chronic problem. Currently on omeprazole about 3-4 times per week. Had endoscopy about 3 weeks ago and colonscopy earlier this week. Followed by [facility].      Elevated LDL cholesterol level  Chronic problem. Currently on rosuvastatin 10mg.  Last lipid panel done 1/7/2021, LDL 78.     Essential hypertension  Chronic problem. Well controlled with lisinopril 10mg.      MAVERICK (obstructive sleep apnea)  Chronic problem. Compliant with CPAP. Sees Dr. Mccarthy regularly.     Prediabetes  Chronic problem. Fasting blood sugar 136. A1C today was 5.9% working on diet and exercise.    Stage 3 chronic kidney disease (HCC)  Chronic problem. Stable. Last GFR was 51 in 1/7/2021.    Shoulder pain  Chronic problem. Intermittent. Mostly at night, difficulty sleeping.  Normal range of motion.  No pain at visit today.  Normal exam.  Reviewed x-ray, does have some arthritis in the glenohumeral joint.  No improvement with muscle relaxer.  However, patient states that the pain is tolerable, does not desire referral to Ortho for further imaging at this point.    Past Medical History:   Diagnosis Date   • Depression     pre diabetes   • Diabetes (HCC)      prediabetic   • Heart burn     GERD   • Hepatitis A 2001   • High cholesterol     patient denies, states medication is for pre-diabetes   • Hypertension     patient denies, states lisinopril is for pre-diabetes   • Murmur    • Prediabetes    • Sleep apnea     cpap in use   • Snoring        Social History     Tobacco Use   • Smoking status: Never Smoker   • Smokeless tobacco: Never Used   Substance Use Topics   • Alcohol use: Yes     Alcohol/week: 0.6 oz     Types: 1 Glasses of wine per week     Frequency: 2-4 times a month     Drinks per session: 1 or 2     Binge frequency: Never   • Drug use: Yes     Comment: Uses CBD oil - last use 9/26/19       Current Outpatient Medications Ordered in Epic   Medication Sig Dispense Refill   • gabapentin (NEURONTIN) 300 MG Cap      • diclofenac sodium 1 % Gel diclofenac 1 % topical gel     • SUPREP BOWEL PREP KIT 17.5-3.13-1.6 GM/177ML Solution USE PER THE FOLLOWING  FOLLOW INSTRUCTIONS THAT WERE MAILED.     • oxyCODONE immediate-release (ROXICODONE) 5 MG Tab oxycodone 5 mg tablet     • traZODone (DESYREL) 50 MG Tab TAKE 1/2 TO 1 TAB BY MOUTH AT BEDTIME AS NEEDED FOR INSOMNIA     • polyethylene glycol/lytes (MIRALAX) 17 g Pack Take 17 g by mouth every day.     • tizanidine (ZANAFLEX) 4 MG Tab Take 1 Tab by mouth at bedtime as needed. 15 Tab 1   • lisinopril (PRINIVIL) 10 MG Tab TAKE 1 TABLET BY MOUTH EVERY DAY 90 Tab 0   • omeprazole (PRILOSEC) 20 MG delayed-release capsule TAKE 1 CAP BY MOUTH AS NEEDED DAILY FOR HEARTBURN 90 Cap 3   • rosuvastatin (CRESTOR) 10 MG Tab TAKE 1 TABLET BY MOUTH EVERY DAY IN THE EVENING 90 Tab 1   • senna-docusate (PERICOLACE OR SENOKOT S) 8.6-50 MG Tab Take 1 Tab by mouth. 30 Tab 0   • Probiotic Product (PROBIOTIC-10) Cap Take 1 Cap by mouth every day.     • multivitamin (THERAGRAN) Tab Take 1 Tab by mouth every day.     • doxycycline (VIBRAMYCIN) 100 MG Cap PLEASE SEE ATTACHED FOR DETAILED DIRECTIONS       No current Epic-ordered  facility-administered medications on file.        Allergies:  Ibuprofen    Health Maintenance: Completed    ROS:  Pulm: no sob, no cough  CV: no chest pain, no palpitations      Objective:       Exam:  /80 (BP Location: Left arm, Patient Position: Sitting, BP Cuff Size: Adult long)   Pulse 80   Temp 36.9 °C (98.4 °F) (Temporal)   Ht 1.829 m (6')   Wt 105.2 kg (232 lb)   SpO2 94%   BMI 31.46 kg/m²  Body mass index is 31.46 kg/m².    General: Normal appearing. No distress.  HEAD: NCAT  EYES: conjunctiva clear, lids without ptosis, pupils equal and reactive to light  EARS: ears normal shape and contour.  MOUTH: normal dentition   Neck:  Normal ROM  Pulmonary: CTAB, no W/R/R. Normal effort. Normal respiratory rate.  Cardiovascular: RRR, no M/R/G. Well perfused. No LE edema  Neurologic: Grossly normal, no focal deficits  Skin: Warm and dry.  No obvious lesions.  Musculoskeletal: Normal gait and station.   Psych: Normal mood and affect. Alert and oriented x3. Judgment and insight is normal.     Labs: 1/7/21 Results reviewed and discussed with the patient, questions answered.    Assessment & Plan:     75 y.o. male with the following -     1. Abnormal stress test  Chronic problem, followed by cardiology in the past, no need for follow-up.    2. Acute right-sided low back pain without sciatica  Chronic problem, followed by Dr. Frost.  Working with pain management as well.    3. King's esophagus without dysplasia  Chronic problem, followed by [facility], up-to-date on endoscopy, uses omeprazole 3-4 times weekly.    4. Elevated LDL cholesterol level  Chronic problem, well-controlled on rosuvastatin 10 mg    5. Essential hypertension  New problem, well-controlled on lisinopril 10 mg    6. MAVERICK (obstructive sleep apnea)  Chronic problem, compliant with CPAP, followed by Dr. Mccarthy.    7. Prediabetes  Chronic problem, A1c 5.9% today.  Discussed diet and exercise.    8. Stage 3 chronic kidney disease (HCC)  Chronic  problem.  Stable.  GFR 51.    9. Shoulder pain  Chronic problem, reviewed x-rays.  Does have some arthritis.  Discussed referral, patient declines for now.  Declines injection as well.    Return in about 6 months (around 7/15/2021).    Please note that this dictation was created using voice recognition software. I have made every reasonable attempt to correct obvious errors, but I expect that there are errors of grammar and possibly content that I did not discover before finalizing the note.

## 2021-01-29 DIAGNOSIS — I10 ESSENTIAL HYPERTENSION: ICD-10-CM

## 2021-01-29 RX ORDER — LISINOPRIL 10 MG/1
10 TABLET ORAL
Qty: 90 TAB | Refills: 0 | Status: SHIPPED | OUTPATIENT
Start: 2021-01-29 | End: 2021-04-23

## 2021-01-29 RX ORDER — LISINOPRIL 10 MG/1
TABLET ORAL
Qty: 90 TAB | Refills: 0 | OUTPATIENT
Start: 2021-01-29

## 2021-01-30 DIAGNOSIS — E78.00 ELEVATED LDL CHOLESTEROL LEVEL: ICD-10-CM

## 2021-02-01 RX ORDER — ROSUVASTATIN CALCIUM 10 MG/1
TABLET, COATED ORAL
Qty: 90 TAB | Refills: 1 | Status: SHIPPED | OUTPATIENT
Start: 2021-02-01 | End: 2021-07-29

## 2021-02-16 ENCOUNTER — APPOINTMENT (OUTPATIENT)
Dept: FAMILY PLANNING/WOMEN'S HEALTH CLINIC | Facility: IMMUNIZATION CENTER | Age: 76
End: 2021-02-16
Payer: MEDICARE

## 2021-02-16 DIAGNOSIS — Z23 ENCOUNTER FOR VACCINATION: Primary | ICD-10-CM

## 2021-02-16 PROCEDURE — 91300 PFIZER SARS-COV-2 VACCINE: CPT

## 2021-02-16 PROCEDURE — 0001A PFIZER SARS-COV-2 VACCINE: CPT

## 2021-03-06 ENCOUNTER — IMMUNIZATION (OUTPATIENT)
Dept: FAMILY PLANNING/WOMEN'S HEALTH CLINIC | Facility: IMMUNIZATION CENTER | Age: 76
End: 2021-03-06
Attending: INTERNAL MEDICINE
Payer: MEDICARE

## 2021-03-06 DIAGNOSIS — Z23 ENCOUNTER FOR VACCINATION: Primary | ICD-10-CM

## 2021-03-06 PROCEDURE — 91300 PFIZER SARS-COV-2 VACCINE: CPT

## 2021-03-06 PROCEDURE — 0002A PFIZER SARS-COV-2 VACCINE: CPT

## 2021-04-23 DIAGNOSIS — I10 ESSENTIAL HYPERTENSION: ICD-10-CM

## 2021-04-23 RX ORDER — LISINOPRIL 10 MG/1
TABLET ORAL
Qty: 90 TABLET | Refills: 0 | Status: SHIPPED | OUTPATIENT
Start: 2021-04-23 | End: 2021-07-29 | Stop reason: SDUPTHER

## 2021-04-26 ENCOUNTER — OFFICE VISIT (OUTPATIENT)
Dept: MEDICAL GROUP | Facility: MEDICAL CENTER | Age: 76
End: 2021-04-26
Payer: MEDICARE

## 2021-04-26 VITALS
HEIGHT: 72 IN | BODY MASS INDEX: 31.83 KG/M2 | DIASTOLIC BLOOD PRESSURE: 78 MMHG | SYSTOLIC BLOOD PRESSURE: 130 MMHG | TEMPERATURE: 97.9 F | HEART RATE: 71 BPM | WEIGHT: 235 LBS | OXYGEN SATURATION: 96 %

## 2021-04-26 DIAGNOSIS — M79.671 RIGHT FOOT PAIN: ICD-10-CM

## 2021-04-26 DIAGNOSIS — Z23 NEED FOR VACCINATION: ICD-10-CM

## 2021-04-26 PROCEDURE — 90750 HZV VACC RECOMBINANT IM: CPT | Performed by: FAMILY MEDICINE

## 2021-04-26 PROCEDURE — 99214 OFFICE O/P EST MOD 30 MIN: CPT | Mod: 25 | Performed by: FAMILY MEDICINE

## 2021-04-26 PROCEDURE — 90471 IMMUNIZATION ADMIN: CPT | Performed by: FAMILY MEDICINE

## 2021-04-26 ASSESSMENT — FIBROSIS 4 INDEX: FIB4 SCORE: 0.91

## 2021-04-26 NOTE — ASSESSMENT & PLAN NOTE
Started with right toe,   About 10 days ago  Very painful, unable to walk on it.   More beer with friends in town  Had corned beef in cabbage that night.

## 2021-04-27 ENCOUNTER — HOSPITAL ENCOUNTER (OUTPATIENT)
Dept: LAB | Facility: MEDICAL CENTER | Age: 76
End: 2021-04-27
Attending: FAMILY MEDICINE
Payer: MEDICARE

## 2021-04-27 DIAGNOSIS — M79.671 RIGHT FOOT PAIN: ICD-10-CM

## 2021-04-27 LAB
ANION GAP SERPL CALC-SCNC: 7 MMOL/L (ref 7–16)
BUN SERPL-MCNC: 22 MG/DL (ref 8–22)
CALCIUM SERPL-MCNC: 9.7 MG/DL (ref 8.5–10.5)
CHLORIDE SERPL-SCNC: 106 MMOL/L (ref 96–112)
CO2 SERPL-SCNC: 27 MMOL/L (ref 20–33)
CREAT SERPL-MCNC: 1.2 MG/DL (ref 0.5–1.4)
GLUCOSE SERPL-MCNC: 114 MG/DL (ref 65–99)
POTASSIUM SERPL-SCNC: 4.8 MMOL/L (ref 3.6–5.5)
SODIUM SERPL-SCNC: 140 MMOL/L (ref 135–145)
URATE SERPL-MCNC: 8.7 MG/DL (ref 2.5–8.3)

## 2021-04-27 PROCEDURE — 36415 COLL VENOUS BLD VENIPUNCTURE: CPT

## 2021-04-27 PROCEDURE — 80048 BASIC METABOLIC PNL TOTAL CA: CPT

## 2021-04-27 PROCEDURE — 84550 ASSAY OF BLOOD/URIC ACID: CPT

## 2021-04-27 NOTE — PROGRESS NOTES
Subjective:     CC: Diagnoses of Right foot pain and Need for vaccination were pertinent to this visit.    HPI: Patient is a 75 y.o. male established patient who presents today with concern for right foot pain.      Right foot pain  New problem. Started with right toe. Reports that it was extremely painful, couldn't even have blanket over the foot.   Started about 10 days ago, now improved.   Very painful, unable to walk on it. Pain now resolved at this point.  He does report that he had a little different diet recently. Had a few beers with friends who were in town.  Had corned beef in cabbage that night as well.       Past Medical History:   Diagnosis Date   • Depression     pre diabetes   • Diabetes (HCC)     prediabetic   • Heart burn     GERD   • Hepatitis A 2001   • High cholesterol     patient denies, states medication is for pre-diabetes   • Hypertension     patient denies, states lisinopril is for pre-diabetes   • Murmur    • Prediabetes    • Sleep apnea     cpap in use   • Snoring        Social History     Tobacco Use   • Smoking status: Never Smoker   • Smokeless tobacco: Never Used   Substance Use Topics   • Alcohol use: Yes     Alcohol/week: 0.6 oz     Types: 1 Glasses of wine per week   • Drug use: Yes     Comment: Uses CBD oil - last use 9/26/19       Current Outpatient Medications Ordered in Epic   Medication Sig Dispense Refill   • lisinopril (PRINIVIL) 10 MG Tab TAKE 1 TABLET BY MOUTH EVERY DAY 90 tablet 0   • rosuvastatin (CRESTOR) 10 MG Tab TAKE 1 TABLET BY MOUTH EVERY DAY IN THE EVENING 90 Tab 1   • diclofenac sodium 1 % Gel diclofenac 1 % topical gel     • doxycycline (VIBRAMYCIN) 100 MG Cap PLEASE SEE ATTACHED FOR DETAILED DIRECTIONS     • oxyCODONE immediate-release (ROXICODONE) 5 MG Tab oxycodone 5 mg tablet     • polyethylene glycol/lytes (MIRALAX) 17 g Pack Take 17 g by mouth every day.     • omeprazole (PRILOSEC) 20 MG delayed-release capsule TAKE 1 CAP BY MOUTH AS NEEDED DAILY FOR  HEARTBURN 90 Cap 3   • Probiotic Product (PROBIOTIC-10) Cap Take 1 Cap by mouth every day.     • multivitamin (THERAGRAN) Tab Take 1 Tab by mouth every day.     • tizanidine (ZANAFLEX) 4 MG Tab Take 1 Tab by mouth at bedtime as needed. (Patient not taking: Reported on 4/26/2021) 15 Tab 1     No current Epic-ordered facility-administered medications on file.       Allergies:  Ibuprofen    Health Maintenance: Completed    ROS:  Pulm: no sob, no cough  CV: no chest pain, no palpitations        Objective:       Exam:  /78 (BP Location: Left arm, Patient Position: Sitting, BP Cuff Size: Adult long)   Pulse 71   Temp 36.6 °C (97.9 °F) (Temporal)   Ht 1.829 m (6')   Wt 107 kg (235 lb)   SpO2 96%   BMI 31.87 kg/m²  Body mass index is 31.87 kg/m².    General: Normal appearing. No distress.  HEAD: NCAT  EYES: conjunctiva clear, lids without ptosis, pupils equal and reactive to light  EARS: ears normal shape and contour.  MOUTH: normal dentition   Neck:  Normal ROM  Pulmonary: Normal effort. Normal respiratory rate.  Cardiovascular: Well perfused. No LE edema  Neurologic: Grossly normal, no focal deficits  Skin: Warm and dry.  No obvious lesions.  Musculoskeletal: Normal gait and station.   Psych: Normal mood and affect. Alert and oriented x3. Judgment and insight is normal.     Labs: 1/7/21 Results reviewed and discussed with the patient, questions answered.    Assessment & Plan:     75 y.o. male with the following -     1. Right foot pain  New problem. Now mostly resolved. Likely gout. Given that it is his first flare, plan to continue to monitor and treat as needed.   Ordered labs to ensure no worsening of kidney function.   - Basic Metabolic Panel; Future  - URIC ACID; Future    2. Need for vaccination  - Shingrix Vaccine      Return in about 4 months (around 8/26/2021) for Gen 6 month follow up.    Please note that this dictation was created using voice recognition software. I have made every reasonable  attempt to correct obvious errors, but I expect that there are errors of grammar and possibly content that I did not discover before finalizing the note.

## 2021-07-13 ENCOUNTER — OFFICE VISIT (OUTPATIENT)
Dept: MEDICAL GROUP | Facility: MEDICAL CENTER | Age: 76
End: 2021-07-13
Payer: MEDICARE

## 2021-07-13 VITALS
HEIGHT: 72 IN | TEMPERATURE: 97.7 F | BODY MASS INDEX: 31.88 KG/M2 | DIASTOLIC BLOOD PRESSURE: 80 MMHG | WEIGHT: 235.34 LBS | OXYGEN SATURATION: 95 % | SYSTOLIC BLOOD PRESSURE: 136 MMHG | HEART RATE: 60 BPM

## 2021-07-13 DIAGNOSIS — I10 ESSENTIAL HYPERTENSION: ICD-10-CM

## 2021-07-13 DIAGNOSIS — Z12.5 PROSTATE CANCER SCREENING: ICD-10-CM

## 2021-07-13 DIAGNOSIS — N18.30 STAGE 3 CHRONIC KIDNEY DISEASE, UNSPECIFIED WHETHER STAGE 3A OR 3B CKD: ICD-10-CM

## 2021-07-13 DIAGNOSIS — M1A.9XX0 CHRONIC GOUT WITHOUT TOPHUS, UNSPECIFIED CAUSE, UNSPECIFIED SITE: ICD-10-CM

## 2021-07-13 DIAGNOSIS — G89.29 CHRONIC RIGHT-SIDED LOW BACK PAIN WITHOUT SCIATICA: ICD-10-CM

## 2021-07-13 DIAGNOSIS — M54.50 CHRONIC RIGHT-SIDED LOW BACK PAIN WITHOUT SCIATICA: ICD-10-CM

## 2021-07-13 DIAGNOSIS — R73.03 PREDIABETES: ICD-10-CM

## 2021-07-13 DIAGNOSIS — G47.33 OSA (OBSTRUCTIVE SLEEP APNEA): ICD-10-CM

## 2021-07-13 DIAGNOSIS — E78.00 ELEVATED LDL CHOLESTEROL LEVEL: ICD-10-CM

## 2021-07-13 DIAGNOSIS — Z23 NEED FOR VACCINATION: ICD-10-CM

## 2021-07-13 LAB
APPEARANCE UR: CLEAR
BILIRUB UR STRIP-MCNC: NEGATIVE MG/DL
COLOR UR AUTO: YELLOW
GLUCOSE UR STRIP.AUTO-MCNC: NEGATIVE MG/DL
HBA1C MFR BLD: 5.8 % (ref 0–5.6)
INT CON NEG: NEGATIVE
INT CON POS: POSITIVE
KETONES UR STRIP.AUTO-MCNC: NEGATIVE MG/DL
LEUKOCYTE ESTERASE UR QL STRIP.AUTO: NEGATIVE
NITRITE UR QL STRIP.AUTO: NEGATIVE
PH UR STRIP.AUTO: 5.5 [PH] (ref 5–8)
PROT UR QL STRIP: NEGATIVE MG/DL
RBC UR QL AUTO: NEGATIVE
SP GR UR STRIP.AUTO: 1.01
UROBILINOGEN UR STRIP-MCNC: NORMAL MG/DL

## 2021-07-13 PROCEDURE — 99214 OFFICE O/P EST MOD 30 MIN: CPT | Mod: 25 | Performed by: FAMILY MEDICINE

## 2021-07-13 PROCEDURE — 90750 HZV VACC RECOMBINANT IM: CPT | Performed by: FAMILY MEDICINE

## 2021-07-13 PROCEDURE — 81002 URINALYSIS NONAUTO W/O SCOPE: CPT | Performed by: FAMILY MEDICINE

## 2021-07-13 PROCEDURE — 83036 HEMOGLOBIN GLYCOSYLATED A1C: CPT | Performed by: FAMILY MEDICINE

## 2021-07-13 PROCEDURE — 90471 IMMUNIZATION ADMIN: CPT | Performed by: FAMILY MEDICINE

## 2021-07-13 RX ORDER — ALLOPURINOL 300 MG/1
300 TABLET ORAL DAILY
Qty: 90 TABLET | Refills: 1 | Status: SHIPPED | OUTPATIENT
Start: 2021-07-13 | End: 2022-01-03

## 2021-07-13 RX ORDER — CEPHALEXIN 500 MG/1
500 CAPSULE ORAL
COMMUNITY
Start: 2021-06-21 | End: 2021-07-13

## 2021-07-13 RX ORDER — DIAZEPAM 10 MG/1
TABLET ORAL
COMMUNITY
Start: 2021-05-10 | End: 2021-09-07

## 2021-07-13 ASSESSMENT — FIBROSIS 4 INDEX: FIB4 SCORE: 0.91

## 2021-07-13 NOTE — ASSESSMENT & PLAN NOTE
Had spinal cord stimulator  Pain in back has overall improved.   However, feels like he is having kidney pain  Frequent urination  Has appt with urology in August  No blood in the urine  Comes and goes  Worst in the morning, improves with movement.   Had u/s 1.5 yrs ago.   Had cysts  Frequent urination has been present for 3-4 yrs.

## 2021-07-13 NOTE — PROGRESS NOTES
Subjective:     CC: Diagnoses of Prediabetes, Stage 3 chronic kidney disease, unspecified whether stage 3a or 3b CKD (HCC), Chronic right-sided low back pain without sciatica, Essential hypertension, Chronic gout without tophus, unspecified cause, unspecified site, Prostate cancer screening, Elevated LDL cholesterol level, MAVERICK (obstructive sleep apnea), and Need for vaccination were pertinent to this visit.    HPI: Patient is a 75 y.o. male established patient who presents today with the following concern.       Chronic right-sided low back pain without sciatica  Had spinal cord stimulator  Pain in back has overall improved.   However, feels like he is having kidney pain  Frequent urination  Has appt with urology in August  No blood in the urine  Comes and goes  Worst in the morning, improves with movement.   Had u/s 1.5 yrs ago.   Had cysts  Frequent urination has been present for 3-4 yrs.     Essential hypertension  Chronic problem. Well controlled on lisinopril 10 mg.    Prediabetes  5.8% today    Stage 3 chronic kidney disease (HCC)  Most recent GFR looked great in April 2021.       Past Medical History:   Diagnosis Date   • Depression     pre diabetes   • Diabetes (HCC)     prediabetic   • Heart burn     GERD   • Hepatitis A 2001   • High cholesterol     patient denies, states medication is for pre-diabetes   • Hypertension     patient denies, states lisinopril is for pre-diabetes   • Murmur    • Prediabetes    • Sleep apnea     cpap in use   • Snoring        Social History     Tobacco Use   • Smoking status: Never Smoker   • Smokeless tobacco: Never Used   Vaping Use   • Vaping Use: Never used   Substance Use Topics   • Alcohol use: Yes     Alcohol/week: 0.6 oz     Types: 1 Glasses of wine per week   • Drug use: Yes     Comment: Uses CBD oil - last use 9/26/19       Current Outpatient Medications Ordered in Epic   Medication Sig Dispense Refill   • diazepam (VALIUM) 10 MG tablet TAKE 1 ORAL TABLET ONCE A DAY.  TAKE 30 MINUTES BEFORE PROCEDURE     • allopurinol (ZYLOPRIM) 300 MG Tab Take 1 tablet by mouth every day. 90 tablet 1   • lisinopril (PRINIVIL) 10 MG Tab TAKE 1 TABLET BY MOUTH EVERY DAY 90 tablet 0   • rosuvastatin (CRESTOR) 10 MG Tab TAKE 1 TABLET BY MOUTH EVERY DAY IN THE EVENING 90 Tab 1   • polyethylene glycol/lytes (MIRALAX) 17 g Pack Take 17 g by mouth every day.     • omeprazole (PRILOSEC) 20 MG delayed-release capsule TAKE 1 CAP BY MOUTH AS NEEDED DAILY FOR HEARTBURN 90 Cap 3   • Probiotic Product (PROBIOTIC-10) Cap Take 1 Cap by mouth every day.     • multivitamin (THERAGRAN) Tab Take 1 Tab by mouth every day.       No current [facility]-ordered facility-administered medications on file.       Allergies:  Ibuprofen    Health Maintenance: Completed    ROS:  Pulm: no sob, no cough  CV: no chest pain, no palpitations      Objective:       Exam:  /80 (BP Location: Left arm, Patient Position: Sitting, BP Cuff Size: Adult long)   Pulse 60   Temp 36.5 °C (97.7 °F) (Temporal)   Ht 1.829 m (6')   Wt 107 kg (235 lb 5.5 oz)   SpO2 95%   BMI 31.92 kg/m²  Body mass index is 31.92 kg/m².    General: Normal appearing. No distress.  HEAD: NCAT  EYES: conjunctiva clear, lids without ptosis, pupils equal and reactive to light  EARS: ears normal shape and contour.  MOUTH: normal dentition   Neck:  Normal ROM  Pulmonary: Normal effort. Normal respiratory rate.  Cardiovascular: Well perfused. No LE edema  Neurologic: Grossly normal, no focal deficits  Skin: Warm and dry.  No obvious lesions.  Musculoskeletal: Normal gait and station.   Psych: Normal mood and affect. Alert and oriented x3. Judgment and insight is normal.     Labs: 4/27/21 Results reviewed and discussed with the patient, questions answered.    Assessment & Plan:     75 y.o. male with the following -     1. Prediabetes  Chronic problem. Stable. A1C 5.8% today.  - POCT Hemoglobin A1C  - Comp Metabolic Panel; Future    2. Stage 3 chronic kidney disease,  unspecified whether stage 3a or 3b CKD (HCC)  Chronic problem. Stable. GFR 56 on recent labs in April  - POCT Urinalysis    3. Chronic right-sided low back pain without sciatica  Chronic problem. Patient does not improvement since stimulator device was inserted, however, having some bilateral flank pain. Has rather long h/o polyuria. UA done which was completely negative. Advised pain likely from chronic back pain. Reviewed u/s from 2020 which was normal. He does have an appt with urology in August.      4. Essential hypertension  Chronic problem. Moderately well controlled on lisinopril 10mg.  - Comp Metabolic Panel; Future    5. Chronic gout without tophus, unspecified cause, unspecified site  Chronic problem. Patient noting frequent pain in the food. Uric acid level elevated at 8.7  Plan to star allopurinol.  - URIC ACID; Future  - allopurinol (ZYLOPRIM) 300 MG Tab; Take 1 tablet by mouth every day.  Dispense: 90 tablet; Refill: 1    6. Prostate cancer screening  - PROSTATE SPECIFIC AG SCREENING; Future    7. Elevated LDL cholesterol level  - Lipid Profile; Future    8. MAVERICK (obstructive sleep apnea)  - CBC WITH DIFFERENTIAL; Future    Plan to f/u in 6 months with labs  Return in about 6 months (around 1/13/2022).    Please note that this dictation was created using voice recognition software. I have made every reasonable attempt to correct obvious errors, but I expect that there are errors of grammar and possibly content that I did not discover before finalizing the note.

## 2021-07-26 ENCOUNTER — HOSPITAL ENCOUNTER (OUTPATIENT)
Dept: RADIOLOGY | Facility: MEDICAL CENTER | Age: 76
End: 2021-07-26
Payer: MEDICARE

## 2021-07-27 ENCOUNTER — HOSPITAL ENCOUNTER (OUTPATIENT)
Dept: RADIOLOGY | Facility: MEDICAL CENTER | Age: 76
End: 2021-07-27
Payer: MEDICARE

## 2021-07-27 DIAGNOSIS — E78.00 ELEVATED LDL CHOLESTEROL LEVEL: ICD-10-CM

## 2021-07-29 DIAGNOSIS — I10 ESSENTIAL HYPERTENSION: ICD-10-CM

## 2021-07-29 RX ORDER — LISINOPRIL 10 MG/1
10 TABLET ORAL
Qty: 90 TABLET | Refills: 0 | Status: SHIPPED | OUTPATIENT
Start: 2021-07-29 | End: 2021-12-13

## 2021-07-29 RX ORDER — ROSUVASTATIN CALCIUM 10 MG/1
TABLET, COATED ORAL
Qty: 90 TABLET | Refills: 1 | Status: SHIPPED | OUTPATIENT
Start: 2021-07-29 | End: 2022-01-31

## 2021-07-29 NOTE — TELEPHONE ENCOUNTER
Received request via: Pharmacy    Was the patient seen in the last year in this department? Yes    Does the patient have an active prescription (recently filled or refills available) for medication(s) requested? No     Future Appointments       Provider Department Center    1/13/2022 1:00 PM Hue Alvarez M.D. [facility]

## 2021-09-07 ENCOUNTER — OFFICE VISIT (OUTPATIENT)
Dept: MEDICAL GROUP | Facility: MEDICAL CENTER | Age: 76
End: 2021-09-07
Payer: MEDICARE

## 2021-09-07 VITALS
HEART RATE: 73 BPM | DIASTOLIC BLOOD PRESSURE: 68 MMHG | WEIGHT: 228 LBS | TEMPERATURE: 97.9 F | SYSTOLIC BLOOD PRESSURE: 124 MMHG | HEIGHT: 72 IN | OXYGEN SATURATION: 94 % | BODY MASS INDEX: 30.88 KG/M2

## 2021-09-07 DIAGNOSIS — R10.31 ABDOMINAL PAIN, ACUTE, BILATERAL LOWER QUADRANT: ICD-10-CM

## 2021-09-07 DIAGNOSIS — R10.32 ABDOMINAL PAIN, ACUTE, BILATERAL LOWER QUADRANT: ICD-10-CM

## 2021-09-07 PROCEDURE — 99213 OFFICE O/P EST LOW 20 MIN: CPT | Performed by: FAMILY MEDICINE

## 2021-09-07 RX ORDER — DOXYCYCLINE HYCLATE 100 MG/1
CAPSULE ORAL
COMMUNITY
End: 2021-09-07

## 2021-09-07 RX ORDER — TRAZODONE HYDROCHLORIDE 50 MG/1
TABLET ORAL
COMMUNITY
End: 2022-06-16

## 2021-09-07 RX ORDER — SODIUM, POTASSIUM,MAG SULFATES 17.5-3.13G
SOLUTION, RECONSTITUTED, ORAL ORAL
COMMUNITY
End: 2021-09-07

## 2021-09-07 RX ORDER — TIZANIDINE 4 MG/1
TABLET ORAL
COMMUNITY
End: 2022-06-16

## 2021-09-07 RX ORDER — CEPHALEXIN 500 MG/1
CAPSULE ORAL
COMMUNITY
End: 2021-09-07

## 2021-09-07 RX ORDER — GABAPENTIN 300 MG/1
CAPSULE ORAL
COMMUNITY
End: 2023-02-21

## 2021-09-07 ASSESSMENT — FIBROSIS 4 INDEX: FIB4 SCORE: 0.91

## 2021-09-07 NOTE — ASSESSMENT & PLAN NOTE
Now bilateral  Has bilateral low back pain as well  Has appt with urology next week.   No fevers  Normal bowel movement  No blood in the urination  No pain with urination  Pain worst with laying down.   Improves with BM  Improves after he stretches  Has spinal cord stimulator which has been helpful.   Taking miralax. Has been having loose stools and stools multiple times in the morning.   Takes a fiber pill

## 2021-09-08 NOTE — PROGRESS NOTES
Subjective:     CC: The encounter diagnosis was Abdominal pain, acute, bilateral lower quadrant.    HPI: Patient is a 75 y.o. male established patient who presents today with bilateral lower quadrant pain.    Bilateral lower quadrant pain  Patient has had chronic right lower quadrant pain, now bilateral  Has bilateral low back pain as well.   Has appt with urology next week.   No fevers  No real change in bowel movements although does have difficulty with bowel movements.  No blood in the urine.  No pain with urination  Pain worst with laying down.   Improves with BM.   Improves after he stretches as well.  Has spinal cord stimulator which has been helpful in general for his back pain although still struggles pretty significantly with back pain.   Taking miralax. Has been having loose stools and stools multiple times in the morning.  Generally once he is emptied his bowels the abdominal pain resolves.  Takes a fiber pill daily.      Past Medical History:   Diagnosis Date   • Depression     pre diabetes   • Diabetes (HCC)     prediabetic   • Heart burn     GERD   • Hepatitis A 2001   • High cholesterol     patient denies, states medication is for pre-diabetes   • Hypertension     patient denies, states lisinopril is for pre-diabetes   • Murmur    • Prediabetes    • Sleep apnea     cpap in use   • Snoring        Social History     Tobacco Use   • Smoking status: Never Smoker   • Smokeless tobacco: Never Used   Vaping Use   • Vaping Use: Never used   Substance Use Topics   • Alcohol use: Yes     Alcohol/week: 0.6 oz     Types: 1 Glasses of wine per week   • Drug use: Yes     Comment: Uses CBD oil - last use 9/26/19       Current Outpatient Medications Ordered in Epic   Medication Sig Dispense Refill   • gabapentin (NEURONTIN) 300 MG Cap gabapentin 300 mg capsule   TAKE 1 CAPSULE BY MOUTH THREE TIMES A DAY     • tizanidine (ZANAFLEX) 4 MG Tab tizanidine 4 mg tablet   TAKE 1 TABLET BY MOUTH EVERY DAY AT BEDTIME AS NEEDED      • rosuvastatin (CRESTOR) 10 MG Tab TAKE 1 TABLET BY MOUTH EVERY DAY IN THE EVENING 90 tablet 1   • lisinopril (PRINIVIL) 10 MG Tab Take 1 tablet by mouth every day. 90 tablet 0   • allopurinol (ZYLOPRIM) 300 MG Tab Take 1 tablet by mouth every day. 90 tablet 1   • polyethylene glycol/lytes (MIRALAX) 17 g Pack Take 17 g by mouth every day.     • omeprazole (PRILOSEC) 20 MG delayed-release capsule TAKE 1 CAP BY MOUTH AS NEEDED DAILY FOR HEARTBURN 90 Cap 3   • Probiotic Product (PROBIOTIC-10) Cap Take 1 Cap by mouth every day.     • multivitamin (THERAGRAN) Tab Take 1 Tab by mouth every day.     • traZODone (DESYREL) 50 MG Tab trazodone 50 mg tablet   TAKE 1/2 TO 1 TAB BY MOUTH AT BEDTIME AS NEEDED FOR INSOMNIA (Patient not taking: Reported on 9/7/2021)       No current [facility]-ordered facility-administered medications on file.       Allergies:  Ibuprofen    Health Maintenance: Completed    ROS:  Pulm: no sob, no cough  CV: no chest pain, no palpitations        Objective:       Exam:  /68 (BP Location: Right arm, Patient Position: Sitting, BP Cuff Size: Adult long)   Pulse 73   Temp 36.6 °C (97.9 °F) (Temporal)   Ht 1.829 m (6')   Wt 103 kg (228 lb)   SpO2 94%   BMI 30.92 kg/m²  Body mass index is 30.92 kg/m².    General: Normal appearing. No distress.  HEAD: NCAT  EYES: conjunctiva clear, lids without ptosis, pupils equal and reactive to light  EARS: ears normal shape and contour.  MOUTH: normal dentition   Neck:  Normal ROM  Pulmonary: Normal effort. Normal respiratory rate.  Cardiovascular: Well perfused. No LE edema  Abdomen: Soft, tender to palpation in the bilateral anterior superior iliac spines.  No tenderness in the abdomen.  No rebound or guarding.  Neurologic: Grossly normal, no focal deficits  Skin: Warm and dry.  No obvious lesions.  Musculoskeletal: Normal gait and station.   Psych: Normal mood and affect. Alert and oriented x3. Judgment and insight is normal.    Labs: April 27, 2021 results  reviewed and discussed with the patient, questions answered.    Assessment & Plan:     75 y.o. male with the following -     1.  Bilateral lower quadrant pain  New problem.  Patient reports bilateral lower quadrant pain for the past couple days.  He does struggle with constipation and diarrhea.  The pain generally resolves entirely once he is emptied his bowels.  I believe this is likely IBS.  Handout given.  Advised increasing fiber.  He does have an appointment with urology next week.  Advised to please follow-up with any worsening symptoms.      Return if symptoms worsen or fail to improve.    Please note that this dictation was created using voice recognition software. I have made every reasonable attempt to correct obvious errors, but I expect that there are errors of grammar and possibly content that I did not discover before finalizing the note.

## 2021-09-09 RX ORDER — OMEPRAZOLE 20 MG/1
CAPSULE, DELAYED RELEASE ORAL
Qty: 90 CAPSULE | Refills: 3 | Status: SHIPPED | OUTPATIENT
Start: 2021-09-09 | End: 2022-08-01

## 2021-09-17 ENCOUNTER — HOSPITAL ENCOUNTER (OUTPATIENT)
Facility: MEDICAL CENTER | Age: 76
End: 2021-09-17
Attending: PHYSICIAN ASSISTANT
Payer: COMMERCIAL

## 2021-11-11 ENCOUNTER — OFFICE VISIT (OUTPATIENT)
Dept: URGENT CARE | Facility: CLINIC | Age: 76
End: 2021-11-11
Payer: MEDICARE

## 2021-11-11 VITALS
DIASTOLIC BLOOD PRESSURE: 86 MMHG | TEMPERATURE: 97.7 F | HEIGHT: 72 IN | RESPIRATION RATE: 16 BRPM | SYSTOLIC BLOOD PRESSURE: 128 MMHG | OXYGEN SATURATION: 94 % | WEIGHT: 230 LBS | HEART RATE: 84 BPM | BODY MASS INDEX: 31.15 KG/M2

## 2021-11-11 DIAGNOSIS — H57.11 PAIN OF RIGHT EYE: ICD-10-CM

## 2021-11-11 DIAGNOSIS — H57.89 REDNESS OF RIGHT EYE: ICD-10-CM

## 2021-11-11 PROCEDURE — 99213 OFFICE O/P EST LOW 20 MIN: CPT | Performed by: PHYSICIAN ASSISTANT

## 2021-11-11 RX ORDER — FLUOROURACIL 50 MG/G
CREAM TOPICAL
COMMUNITY
Start: 2021-10-05 | End: 2024-01-08

## 2021-11-11 ASSESSMENT — ENCOUNTER SYMPTOMS
SORE THROAT: 0
DOUBLE VISION: 0
EYE REDNESS: 1
COUGH: 0
EYE ITCHING: 0
NAUSEA: 0
EYE DISCHARGE: 1
FOREIGN BODY SENSATION: 0
FEVER: 0
HEADACHES: 0
SHORTNESS OF BREATH: 0
PHOTOPHOBIA: 0
VOMITING: 0
BLURRED VISION: 0

## 2021-11-11 ASSESSMENT — VISUAL ACUITY: OU: 1

## 2021-11-11 ASSESSMENT — FIBROSIS 4 INDEX: FIB4 SCORE: 0.91

## 2021-11-11 NOTE — PROGRESS NOTES
Subjective     Valeriy Orlando is a 75 y.o. male who presents with Conjunctivitis (2x days ago, right eye, red)            Eye Problem   The right eye is affected. This is a new problem. Episode onset: x 3 days ago. The problem occurs constantly. The problem has been unchanged. There was no injury mechanism. The pain is mild. He does not wear contacts. Associated symptoms include an eye discharge (The patient reports watery discharge from the right eye.) and eye redness. Pertinent negatives include no blurred vision, double vision, fever, foreign body sensation, itching, nausea, photophobia, recent URI or vomiting. He has tried eye drops for the symptoms.     The patient initially thought his symptoms be related to a dry eye from his CPAP machine.  However, the patient states his symptoms have been persistent.  The patient reports associated eye pain and eye redness to the right eye.  He also notes slight increased pain with eye movements.    PMH:  has a past medical history of Depression, Diabetes (HCC), Heart burn, Hepatitis A (2001), High cholesterol, Hypertension, Murmur, Prediabetes, Sleep apnea, and Snoring.  MEDS:   Current Outpatient Medications:   •  omeprazole (PRILOSEC) 20 MG delayed-release capsule, TAKE 1 CAP BY MOUTH AS NEEDED DAILY FOR HEARTBURN, Disp: 90 Capsule, Rfl: 3  •  gabapentin (NEURONTIN) 300 MG Cap, gabapentin 300 mg capsule  TAKE 1 CAPSULE BY MOUTH THREE TIMES A DAY, Disp: , Rfl:   •  tizanidine (ZANAFLEX) 4 MG Tab, tizanidine 4 mg tablet  TAKE 1 TABLET BY MOUTH EVERY DAY AT BEDTIME AS NEEDED, Disp: , Rfl:   •  rosuvastatin (CRESTOR) 10 MG Tab, TAKE 1 TABLET BY MOUTH EVERY DAY IN THE EVENING, Disp: 90 tablet, Rfl: 1  •  lisinopril (PRINIVIL) 10 MG Tab, Take 1 tablet by mouth every day., Disp: 90 tablet, Rfl: 0  •  allopurinol (ZYLOPRIM) 300 MG Tab, Take 1 tablet by mouth every day., Disp: 90 tablet, Rfl: 1  •  polyethylene glycol/lytes (MIRALAX) 17 g Pack, Take 17 g by mouth every day.,  Disp: , Rfl:   •  Probiotic Product (PROBIOTIC-10) Cap, Take 1 Cap by mouth every day., Disp: , Rfl:   •  multivitamin (THERAGRAN) Tab, Take 1 Tab by mouth every day., Disp: , Rfl:   •  traZODone (DESYREL) 50 MG Tab, trazodone 50 mg tablet  TAKE 1/2 TO 1 TAB BY MOUTH AT BEDTIME AS NEEDED FOR INSOMNIA (Patient not taking: Reported on 9/7/2021), Disp: , Rfl:   ALLERGIES:   Allergies   Allergen Reactions   • Ibuprofen      PCP does not want him on this due to kidneys     SURGHX:   Past Surgical History:   Procedure Laterality Date   • LUMBAR LAMINECTOMY DISKECTOMY Bilateral 10/3/2019    Procedure: LAMINECTOMY, SPINE, LUMBAR, WITH DISCECTOMY;  Surgeon: Kyler Juares M.D.;  Location: [facility];  Service: Neurosurgery   • LUMBAR FUSION POSTERIOR Bilateral 10/3/2019    Procedure: FUSION, SPINE, LUMBAR, PLIF- REDO L4-5 WITH TLIF;  Surgeon: Kyler Juares M.D.;  Location: SURGERY [facility];  Service: Neurosurgery   • LUMBAR LAMINECTOMY DISKECTOMY  9/4/2018    Procedure: LUMBAR LAMINECTOMY DISKECTOMY-  L2-3 OPEN LAMI AND REDO L3-4 LAMI;  Surgeon: Bentley Claudio M.D.;  Location: [facility];  Service: Neurosurgery   • LUMBAR LAMINECTOMY DISKECTOMY  11/29/2016    Procedure: LUMBAR LAMINECTOMY DISKECTOMY L3-5 open laminectomy ;  Surgeon: Bentley Claudio M.D.;  Location: SURGERY [facility];  Service:    • OTHER ORTHOPEDIC SURGERY  1990    right knee scope   • GROIN EXPLORATION      growth   • KNEE ARTHROSCOPY       SOCHX:  reports that he has never smoked. He has never used smokeless tobacco. He reports current alcohol use of about 0.6 oz of alcohol per week. He reports current drug use.  FH: Family history was reviewed, no pertinent findings to report      Review of Systems   Constitutional: Negative for fever.   HENT: Negative for congestion, ear pain and sore throat.    Eyes: Positive for discharge (The patient reports watery discharge from the right eye.) and redness.  Negative for blurred vision, double vision, photophobia and itching.   Respiratory: Negative for cough and shortness of breath.    Cardiovascular: Negative for chest pain and leg swelling.   Gastrointestinal: Negative for nausea and vomiting.   Musculoskeletal: Negative for joint pain.   Skin: Negative for rash.   Neurological: Negative for headaches.              Objective     /86 (BP Location: Left arm, Patient Position: Sitting, BP Cuff Size: Adult)   Pulse 84   Temp 36.5 °C (97.7 °F) (Temporal)   Resp 16   Ht 1.829 m (6')   Wt 104 kg (230 lb)   SpO2 94%   BMI 31.19 kg/m²      Physical Exam  Constitutional:       General: He is not in acute distress.     Appearance: Normal appearance. He is well-developed. He is not ill-appearing.   HENT:      Head: Normocephalic and atraumatic.      Comments: No periorbital edema.  No periorbital erythema.     Right Ear: External ear normal.      Left Ear: External ear normal.      Nose: Nose normal.   Eyes:      General: Lids are normal. Vision grossly intact.         Right eye: No foreign body or discharge.      Extraocular Movements: Extraocular movements intact.      Conjunctiva/sclera:      Right eye: Right conjunctiva is injected. No chemosis or hemorrhage.     Comments: The patient reports slight pain with eye movements of the right eye   Cardiovascular:      Rate and Rhythm: Normal rate.   Pulmonary:      Effort: Pulmonary effort is normal.   Musculoskeletal:      Cervical back: Normal range of motion and neck supple.   Skin:     General: Skin is warm and dry.   Neurological:      Mental Status: He is alert and oriented to person, place, and time.                             Assessment & Plan         1. Pain of right eye    2. Redness of right eye    The patient's presenting symptoms and physical exam endings are consistent with pain and redness of the right eye.  On physical exam, the patient's right conjunctiva was significantly injected.  No  discharge/drainage or foreign body was noted.  The patient's eyelids were normal.  No periorbital edema or periorbital erythema was present.  The patient's extraocular movements were intact.  However, the patient reports slight pain with eye movements of the right eye.  The patient's pupils were equal round and reactive to light bilaterally.  The remainder the patient's physical exam today in clinic was normal.  The cause of the patient's eye pain and redness is unknown at this time.  Based on the patient's presenting symptoms and physical exam findings, I have low suspicion for acute conjunctivitis.  The patient's symptoms may be related to acute uveitis.  Based on the patient's presenting symptoms and physical exam findings, I believe the patient would benefit from evaluation by an eye specialist.  Therefore, a same-day appointment was made for the patient at [facility].  The patient will follow up with [facility] for further evaluation and management of his acute eye pain and redness.  The patient agrees with this plan.  Informed the patient of the associated risks of not seeking further care for his current symptoms, and he verbalized understanding.    Differential diagnoses, supportive care, and indications for immediate follow-up discussed with patient.   Instructed to return to clinic or nearest emergency department for any change in condition, further concerns, or worsening of symptoms.    Plan:  Transfer care to [facility].    I personally reviewed prior external notes and test results pertinent to today's visit.  I have independently reviewed and interpreted all diagnostics ordered during this urgent care visit.     Time spent evaluating this patient was at least 30 minutes and includes preparing for visit, obtaining history, exam and evaluation, ordering labs/tests/procedures/medications, independent interpretation, and counseling/education.    Please note  that this dictation was created using voice recognition software. I have made every reasonable attempt to correct obvious errors, but I expect that there may be errors of grammar and possibly content that I did not discover before finalizing the note.     This note was electronically signed by Jennifer Davis PA-C

## 2021-12-13 DIAGNOSIS — I10 ESSENTIAL HYPERTENSION: ICD-10-CM

## 2021-12-13 RX ORDER — LISINOPRIL 10 MG/1
10 TABLET ORAL
Qty: 90 TABLET | Refills: 3 | Status: SHIPPED | OUTPATIENT
Start: 2021-12-13 | End: 2022-12-14 | Stop reason: SDUPTHER

## 2022-01-03 DIAGNOSIS — M1A.9XX0 CHRONIC GOUT WITHOUT TOPHUS, UNSPECIFIED CAUSE, UNSPECIFIED SITE: ICD-10-CM

## 2022-01-03 RX ORDER — ALLOPURINOL 300 MG/1
300 TABLET ORAL DAILY
Qty: 90 TABLET | Refills: 1 | Status: SHIPPED | OUTPATIENT
Start: 2022-01-03 | End: 2022-04-18

## 2022-01-05 ENCOUNTER — HOSPITAL ENCOUNTER (OUTPATIENT)
Dept: LAB | Facility: MEDICAL CENTER | Age: 77
End: 2022-01-05
Attending: FAMILY MEDICINE
Payer: MEDICARE

## 2022-01-05 DIAGNOSIS — E78.00 ELEVATED LDL CHOLESTEROL LEVEL: ICD-10-CM

## 2022-01-05 DIAGNOSIS — G47.33 OSA (OBSTRUCTIVE SLEEP APNEA): ICD-10-CM

## 2022-01-05 DIAGNOSIS — R73.03 PREDIABETES: ICD-10-CM

## 2022-01-05 DIAGNOSIS — M1A.9XX0 CHRONIC GOUT WITHOUT TOPHUS, UNSPECIFIED CAUSE, UNSPECIFIED SITE: ICD-10-CM

## 2022-01-05 DIAGNOSIS — Z12.5 PROSTATE CANCER SCREENING: ICD-10-CM

## 2022-01-05 DIAGNOSIS — I10 ESSENTIAL HYPERTENSION: ICD-10-CM

## 2022-01-05 LAB
ALBUMIN SERPL BCP-MCNC: 4.6 G/DL (ref 3.2–4.9)
ALBUMIN/GLOB SERPL: 1.8 G/DL
ALP SERPL-CCNC: 92 U/L (ref 30–99)
ALT SERPL-CCNC: 19 U/L (ref 2–50)
ANION GAP SERPL CALC-SCNC: 13 MMOL/L (ref 7–16)
AST SERPL-CCNC: 16 U/L (ref 12–45)
BASOPHILS # BLD AUTO: 0.6 % (ref 0–1.8)
BASOPHILS # BLD: 0.04 K/UL (ref 0–0.12)
BILIRUB SERPL-MCNC: 0.3 MG/DL (ref 0.1–1.5)
BUN SERPL-MCNC: 28 MG/DL (ref 8–22)
CALCIUM SERPL-MCNC: 9.8 MG/DL (ref 8.5–10.5)
CHLORIDE SERPL-SCNC: 105 MMOL/L (ref 96–112)
CHOLEST SERPL-MCNC: 167 MG/DL (ref 100–199)
CO2 SERPL-SCNC: 22 MMOL/L (ref 20–33)
CREAT SERPL-MCNC: 1.38 MG/DL (ref 0.5–1.4)
EOSINOPHIL # BLD AUTO: 0.62 K/UL (ref 0–0.51)
EOSINOPHIL NFR BLD: 8.8 % (ref 0–6.9)
ERYTHROCYTE [DISTWIDTH] IN BLOOD BY AUTOMATED COUNT: 53.3 FL (ref 35.9–50)
FASTING STATUS PATIENT QL REPORTED: NORMAL
GLOBULIN SER CALC-MCNC: 2.6 G/DL (ref 1.9–3.5)
GLUCOSE SERPL-MCNC: 122 MG/DL (ref 65–99)
HCT VFR BLD AUTO: 47 % (ref 42–52)
HDLC SERPL-MCNC: 79 MG/DL
HGB BLD-MCNC: 15.7 G/DL (ref 14–18)
IMM GRANULOCYTES # BLD AUTO: 0.01 K/UL (ref 0–0.11)
IMM GRANULOCYTES NFR BLD AUTO: 0.1 % (ref 0–0.9)
LDLC SERPL CALC-MCNC: 79 MG/DL
LYMPHOCYTES # BLD AUTO: 1.42 K/UL (ref 1–4.8)
LYMPHOCYTES NFR BLD: 20.2 % (ref 22–41)
MCH RBC QN AUTO: 33.3 PG (ref 27–33)
MCHC RBC AUTO-ENTMCNC: 33.4 G/DL (ref 33.7–35.3)
MCV RBC AUTO: 99.6 FL (ref 81.4–97.8)
MONOCYTES # BLD AUTO: 0.45 K/UL (ref 0–0.85)
MONOCYTES NFR BLD AUTO: 6.4 % (ref 0–13.4)
NEUTROPHILS # BLD AUTO: 4.48 K/UL (ref 1.82–7.42)
NEUTROPHILS NFR BLD: 63.9 % (ref 44–72)
NRBC # BLD AUTO: 0 K/UL
NRBC BLD-RTO: 0 /100 WBC
PLATELET # BLD AUTO: 247 K/UL (ref 164–446)
PMV BLD AUTO: 9.4 FL (ref 9–12.9)
POTASSIUM SERPL-SCNC: 4.9 MMOL/L (ref 3.6–5.5)
PROT SERPL-MCNC: 7.2 G/DL (ref 6–8.2)
PSA SERPL-MCNC: 1.54 NG/ML (ref 0–4)
RBC # BLD AUTO: 4.72 M/UL (ref 4.7–6.1)
SODIUM SERPL-SCNC: 140 MMOL/L (ref 135–145)
TRIGL SERPL-MCNC: 43 MG/DL (ref 0–149)
URATE SERPL-MCNC: 4.8 MG/DL (ref 2.5–8.3)
WBC # BLD AUTO: 7 K/UL (ref 4.8–10.8)

## 2022-01-05 PROCEDURE — 80061 LIPID PANEL: CPT

## 2022-01-05 PROCEDURE — 85025 COMPLETE CBC W/AUTO DIFF WBC: CPT

## 2022-01-05 PROCEDURE — 36415 COLL VENOUS BLD VENIPUNCTURE: CPT

## 2022-01-05 PROCEDURE — 84550 ASSAY OF BLOOD/URIC ACID: CPT

## 2022-01-05 PROCEDURE — 80053 COMPREHEN METABOLIC PANEL: CPT

## 2022-01-05 PROCEDURE — 84153 ASSAY OF PSA TOTAL: CPT | Mod: GA

## 2022-01-24 ENCOUNTER — OFFICE VISIT (OUTPATIENT)
Dept: MEDICAL GROUP | Facility: MEDICAL CENTER | Age: 77
End: 2022-01-24
Payer: MEDICARE

## 2022-01-24 VITALS
SYSTOLIC BLOOD PRESSURE: 128 MMHG | BODY MASS INDEX: 32.51 KG/M2 | WEIGHT: 240 LBS | HEART RATE: 72 BPM | HEIGHT: 72 IN | OXYGEN SATURATION: 96 % | DIASTOLIC BLOOD PRESSURE: 76 MMHG | TEMPERATURE: 98.2 F

## 2022-01-24 DIAGNOSIS — K22.70 BARRETT'S ESOPHAGUS WITHOUT DYSPLASIA: ICD-10-CM

## 2022-01-24 DIAGNOSIS — M54.50 CHRONIC RIGHT-SIDED LOW BACK PAIN WITHOUT SCIATICA: ICD-10-CM

## 2022-01-24 DIAGNOSIS — R73.03 PREDIABETES: ICD-10-CM

## 2022-01-24 DIAGNOSIS — E78.00 ELEVATED LDL CHOLESTEROL LEVEL: ICD-10-CM

## 2022-01-24 DIAGNOSIS — E66.09 CLASS 1 OBESITY DUE TO EXCESS CALORIES WITHOUT SERIOUS COMORBIDITY WITH BODY MASS INDEX (BMI) OF 32.0 TO 32.9 IN ADULT: ICD-10-CM

## 2022-01-24 DIAGNOSIS — G47.33 OSA (OBSTRUCTIVE SLEEP APNEA): ICD-10-CM

## 2022-01-24 DIAGNOSIS — I10 ESSENTIAL HYPERTENSION: ICD-10-CM

## 2022-01-24 DIAGNOSIS — R79.89 ABNORMAL CBC: ICD-10-CM

## 2022-01-24 DIAGNOSIS — N18.30 STAGE 3 CHRONIC KIDNEY DISEASE, UNSPECIFIED WHETHER STAGE 3A OR 3B CKD: ICD-10-CM

## 2022-01-24 DIAGNOSIS — I35.1 NONRHEUMATIC AORTIC VALVE INSUFFICIENCY: ICD-10-CM

## 2022-01-24 DIAGNOSIS — G89.29 CHRONIC RIGHT-SIDED LOW BACK PAIN WITHOUT SCIATICA: ICD-10-CM

## 2022-01-24 PROBLEM — R94.39 ABNORMAL STRESS TEST: Status: RESOLVED | Noted: 2017-05-03 | Resolved: 2022-01-24

## 2022-01-24 PROCEDURE — 99214 OFFICE O/P EST MOD 30 MIN: CPT | Performed by: FAMILY MEDICINE

## 2022-01-24 RX ORDER — METHYLPREDNISOLONE 4 MG/1
TABLET ORAL
COMMUNITY
Start: 2021-11-11 | End: 2022-01-24

## 2022-01-24 ASSESSMENT — PATIENT HEALTH QUESTIONNAIRE - PHQ9: CLINICAL INTERPRETATION OF PHQ2 SCORE: 0

## 2022-01-24 ASSESSMENT — FIBROSIS 4 INDEX: FIB4 SCORE: 1.11

## 2022-01-24 NOTE — PROGRESS NOTES
Subjective:     CC: Diagnoses of Prediabetes, MAVERICK (obstructive sleep apnea), Abnormal CBC, Chronic right-sided low back pain without sciatica, King's esophagus without dysplasia, Elevated LDL cholesterol level, Essential hypertension, Nonrheumatic aortic valve insufficiency, Obesity (BMI 30-39.9), and Stage 3 chronic kidney disease, unspecified whether stage 3a or 3b CKD (HCC) were pertinent to this visit.    HPI: Patient is a 76 y.o. male established patient who presents today for general 6 month follow up. Patient overall doing well. No specific concerns today.       Prediabetes  Chronic problem. Stable. Fasting blood sugar 122.    MAVERICK (obstructive sleep apnea)  Chronic problem. Compliant with CPAP. Followed by Dr. Mccarthy.     Stage 3 chronic kidney disease (HCC)  Chronic problem. Reviewed labs done earlier this month which showed GFR 50.     Chronic right-sided low back pain without sciatica  Chronic problem.   Generally well controlled with Spinal Cord Stimulator.   Followed by pain management.     King's esophagus without dysplasia  Chronic problem. Currently on omeprazole about 3-4 times per week. His last endoscopy in 20201. Followed by [facility].     Elevated LDL cholesterol level  Chronic problem. Well controlled on rosuvastatin. Denies any side effects.   Component      Latest Ref Rng & Units 1/5/2022           9:12 AM   Cholesterol,Tot      100 - 199 mg/dL 167   Triglycerides      0 - 149 mg/dL 43   HDL      >=40 mg/dL 79   LDL      <100 mg/dL 79       Essential hypertension  Chronic problem. Well controlled on lisinopril 10 mg.    Nonrheumatic aortic valve insufficiency  Chronic problem. Echo showed mild Aortic insufficiency in October 2017. Recommend repeat in 5 years. Denies any SOB or lightheadedness.     Obesity (BMI 30-39.9)  Chronic problem. Weight mostly stable. Reports moderately healthy diet and exercises regularly.       Past Medical History:   Diagnosis Date   • Depression     pre diabetes   •  Diabetes (HCC)     prediabetic   • Heart burn     GERD   • Hepatitis A 2001   • High cholesterol     patient denies, states medication is for pre-diabetes   • Hypertension     patient denies, states lisinopril is for pre-diabetes   • Murmur    • Prediabetes    • Sleep apnea     cpap in use   • Snoring        Social History     Tobacco Use   • Smoking status: Never Smoker   • Smokeless tobacco: Never Used   Vaping Use   • Vaping Use: Never used   Substance Use Topics   • Alcohol use: Yes     Alcohol/week: 0.6 oz     Types: 1 Glasses of wine per week   • Drug use: Yes     Comment: Uses CBD oil - last use 9/26/19       Current Outpatient Medications Ordered in Epic   Medication Sig Dispense Refill   • allopurinol (ZYLOPRIM) 300 MG Tab TAKE 1 TABLET BY MOUTH EVERY DAY 90 Tablet 1   • lisinopril (PRINIVIL) 10 MG Tab TAKE 1 TABLET BY MOUTH EVERY DAY 90 Tablet 3   • fluorouracil (EFUDEX) 5 % cream      • omeprazole (PRILOSEC) 20 MG delayed-release capsule TAKE 1 CAP BY MOUTH AS NEEDED DAILY FOR HEARTBURN 90 Capsule 3   • gabapentin (NEURONTIN) 300 MG Cap gabapentin 300 mg capsule   TAKE 1 CAPSULE BY MOUTH THREE TIMES A DAY     • rosuvastatin (CRESTOR) 10 MG Tab TAKE 1 TABLET BY MOUTH EVERY DAY IN THE EVENING 90 tablet 1   • polyethylene glycol/lytes (MIRALAX) 17 g Pack Take 17 g by mouth every day.     • Probiotic Product (PROBIOTIC-10) Cap Take 1 Cap by mouth every day.     • multivitamin (THERAGRAN) Tab Take 1 Tab by mouth every day.     • tizanidine (ZANAFLEX) 4 MG Tab tizanidine 4 mg tablet   TAKE 1 TABLET BY MOUTH EVERY DAY AT BEDTIME AS NEEDED (Patient not taking: Reported on 1/24/2022)     • traZODone (DESYREL) 50 MG Tab trazodone 50 mg tablet   TAKE 1/2 TO 1 TAB BY MOUTH AT BEDTIME AS NEEDED FOR INSOMNIA (Patient not taking: Reported on 9/7/2021)       No current [facility]-ordered facility-administered medications on file.       Allergies:  Ibuprofen    Health Maintenance: Completed      Objective:       Exam:  BP  128/76 (BP Location: Right arm, Patient Position: Sitting, BP Cuff Size: Adult long)   Pulse 72   Temp 36.8 °C (98.2 °F) (Temporal)   Ht 1.829 m (6')   Wt 109 kg (240 lb)   SpO2 96%   BMI 32.55 kg/m²  Body mass index is 32.55 kg/m².    General: Normal appearing. No distress.  HEAD: NCAT  EYES: conjunctiva clear, lids without ptosis, pupils equal and reactive to light  EARS: ears normal shape and contour.  MOUTH: normal dentition   Neck:  Normal ROM  Pulmonary: CTAB. Normal effort. Normal respiratory rate.  Cardiovascular: RRR, systolic murmur. Well perfused. No LE edema  Neurologic: Grossly normal, no focal deficits  Skin: Warm and dry.  No obvious lesions.  Musculoskeletal: Normal gait and station.   Psych: Normal mood and affect. Alert and oriented x3. Judgment and insight is normal.    Labs: 1/5/22 Results reviewed and discussed with the patient, questions answered.    Assessment & Plan:     76 y.o. male with the following -     1. Prediabetes  Chronic problem. Fasting blood sugar mostly stable around 120. A1C ordered for next set of labs.   - Comp Metabolic Panel; Future  - HEMOGLOBIN A1C; Future    2. MAVERICK (obstructive sleep apnea)  Chronic problem. Compliant with CPAP.     3. Abnormal CBC  - CBC WITH DIFFERENTIAL; Future    4. Chronic right-sided low back pain without sciatica  Chronic problem. Doing pretty well currently.     5. King's esophagus without dysplasia   Currently on omeprazole about 3-4 times per week. His last endoscopy in 20201. Followed by [facility].     6. Elevated LDL cholesterol level  Well controlled on rosuvastatin.     7. Essential hypertension  Chronic problem. Well controlled on lisinopril.     8. Nonrheumatic aortic valve insufficiency  Murmur stable. Patient remains asymptomatic. Due for repeat echo in October 2022.     9. Obesity (BMI 30-39.9)  Chronic problem. Reviewed diet and exercise.     10. Stage 3 chronic kidney disease, unspecified whether stage 3a or 3b CKD (HCC)  Chronic  problem. Stable. Annual labs ordered.     Return in about 6 months (around 7/24/2022).    Please note that this dictation was created using voice recognition software. I have made every reasonable attempt to correct obvious errors, but I expect that there are errors of grammar and possibly content that I did not discover before finalizing the note.

## 2022-01-25 PROBLEM — M54.50 ACUTE RIGHT-SIDED LOW BACK PAIN WITHOUT SCIATICA: Status: RESOLVED | Noted: 2020-02-25 | Resolved: 2022-01-25

## 2022-01-25 NOTE — ASSESSMENT & PLAN NOTE
Chronic problem. Currently on omeprazole about 3-4 times per week. His last endoscopy in 20201. Followed by [facility].

## 2022-01-25 NOTE — ASSESSMENT & PLAN NOTE
Chronic problem. Well controlled on rosuvastatin. Denies any side effects.   Component      Latest Ref Rng & Units 1/5/2022           9:12 AM   Cholesterol,Tot      100 - 199 mg/dL 167   Triglycerides      0 - 149 mg/dL 43   HDL      >=40 mg/dL 79   LDL      <100 mg/dL 79

## 2022-01-25 NOTE — ASSESSMENT & PLAN NOTE
Chronic problem.   Generally well controlled with Spinal Cord Stimulator.   Followed by pain management.

## 2022-01-25 NOTE — ASSESSMENT & PLAN NOTE
Chronic problem. Echo showed mild Aortic insufficiency in October 2017. Recommend repeat in 5 years. Denies any SOB or lightheadedness.

## 2022-01-27 PROBLEM — E66.811 CLASS 1 OBESITY DUE TO EXCESS CALORIES WITHOUT SERIOUS COMORBIDITY WITH BODY MASS INDEX (BMI) OF 32.0 TO 32.9 IN ADULT: Status: ACTIVE | Noted: 2017-05-03

## 2022-01-27 PROBLEM — E66.09 CLASS 1 OBESITY DUE TO EXCESS CALORIES WITHOUT SERIOUS COMORBIDITY WITH BODY MASS INDEX (BMI) OF 32.0 TO 32.9 IN ADULT: Status: ACTIVE | Noted: 2017-05-03

## 2022-04-18 DIAGNOSIS — M1A.9XX0 CHRONIC GOUT WITHOUT TOPHUS, UNSPECIFIED CAUSE, UNSPECIFIED SITE: ICD-10-CM

## 2022-04-18 RX ORDER — ALLOPURINOL 300 MG/1
300 TABLET ORAL DAILY
Qty: 90 TABLET | Refills: 1 | Status: SHIPPED | OUTPATIENT
Start: 2022-04-18 | End: 2022-06-02

## 2022-05-13 ENCOUNTER — HOSPITAL ENCOUNTER (OUTPATIENT)
Dept: LAB | Facility: MEDICAL CENTER | Age: 77
End: 2022-05-13
Attending: INTERNAL MEDICINE
Payer: MEDICARE

## 2022-05-13 LAB
ALBUMIN SERPL BCP-MCNC: 4.5 G/DL (ref 3.2–4.9)
ALBUMIN/GLOB SERPL: 1.7 G/DL
ALP SERPL-CCNC: 95 U/L (ref 30–99)
ALT SERPL-CCNC: 20 U/L (ref 2–50)
ANION GAP SERPL CALC-SCNC: 9 MMOL/L (ref 7–16)
AST SERPL-CCNC: 12 U/L (ref 12–45)
BASOPHILS # BLD AUTO: 1 % (ref 0–1.8)
BASOPHILS # BLD: 0.06 K/UL (ref 0–0.12)
BILIRUB SERPL-MCNC: 0.3 MG/DL (ref 0.1–1.5)
BUN SERPL-MCNC: 27 MG/DL (ref 8–22)
CALCIUM SERPL-MCNC: 9.7 MG/DL (ref 8.5–10.5)
CHLORIDE SERPL-SCNC: 102 MMOL/L (ref 96–112)
CHOLEST SERPL-MCNC: 177 MG/DL (ref 100–199)
CO2 SERPL-SCNC: 25 MMOL/L (ref 20–33)
CREAT SERPL-MCNC: 1.34 MG/DL (ref 0.5–1.4)
EOSINOPHIL # BLD AUTO: 0.6 K/UL (ref 0–0.51)
EOSINOPHIL NFR BLD: 9.9 % (ref 0–6.9)
ERYTHROCYTE [DISTWIDTH] IN BLOOD BY AUTOMATED COUNT: 55.9 FL (ref 35.9–50)
EST. AVERAGE GLUCOSE BLD GHB EST-MCNC: 131 MG/DL
FASTING STATUS PATIENT QL REPORTED: NORMAL
GFR SERPLBLD CREATININE-BSD FMLA CKD-EPI: 55 ML/MIN/1.73 M 2
GLOBULIN SER CALC-MCNC: 2.6 G/DL (ref 1.9–3.5)
GLUCOSE SERPL-MCNC: 110 MG/DL (ref 65–99)
HBA1C MFR BLD: 6.2 % (ref 4–5.6)
HCT VFR BLD AUTO: 48.9 % (ref 42–52)
HDLC SERPL-MCNC: 77 MG/DL
HGB BLD-MCNC: 15.8 G/DL (ref 14–18)
IMM GRANULOCYTES # BLD AUTO: 0.01 K/UL (ref 0–0.11)
IMM GRANULOCYTES NFR BLD AUTO: 0.2 % (ref 0–0.9)
LDLC SERPL CALC-MCNC: 93 MG/DL
LYMPHOCYTES # BLD AUTO: 1.72 K/UL (ref 1–4.8)
LYMPHOCYTES NFR BLD: 28.4 % (ref 22–41)
MCH RBC QN AUTO: 32.3 PG (ref 27–33)
MCHC RBC AUTO-ENTMCNC: 32.3 G/DL (ref 33.7–35.3)
MCV RBC AUTO: 100 FL (ref 81.4–97.8)
MONOCYTES # BLD AUTO: 0.55 K/UL (ref 0–0.85)
MONOCYTES NFR BLD AUTO: 9.1 % (ref 0–13.4)
NEUTROPHILS # BLD AUTO: 3.12 K/UL (ref 1.82–7.42)
NEUTROPHILS NFR BLD: 51.4 % (ref 44–72)
NRBC # BLD AUTO: 0 K/UL
NRBC BLD-RTO: 0 /100 WBC
PLATELET # BLD AUTO: 231 K/UL (ref 164–446)
PMV BLD AUTO: 9.9 FL (ref 9–12.9)
POTASSIUM SERPL-SCNC: 4.8 MMOL/L (ref 3.6–5.5)
PROT SERPL-MCNC: 7.1 G/DL (ref 6–8.2)
RBC # BLD AUTO: 4.89 M/UL (ref 4.7–6.1)
SODIUM SERPL-SCNC: 136 MMOL/L (ref 135–145)
TRIGL SERPL-MCNC: 35 MG/DL (ref 0–149)
WBC # BLD AUTO: 6.1 K/UL (ref 4.8–10.8)

## 2022-05-13 PROCEDURE — 83036 HEMOGLOBIN GLYCOSYLATED A1C: CPT | Mod: GA

## 2022-05-13 PROCEDURE — 36415 COLL VENOUS BLD VENIPUNCTURE: CPT

## 2022-05-13 PROCEDURE — 80061 LIPID PANEL: CPT

## 2022-05-13 PROCEDURE — 85025 COMPLETE CBC W/AUTO DIFF WBC: CPT

## 2022-05-13 PROCEDURE — 80053 COMPREHEN METABOLIC PANEL: CPT

## 2022-06-02 PROBLEM — M17.11 OSTEOARTHRITIS OF RIGHT KNEE: Status: ACTIVE | Noted: 2022-06-02

## 2022-06-13 SDOH — ECONOMIC STABILITY: FOOD INSECURITY: WITHIN THE PAST 12 MONTHS, THE FOOD YOU BOUGHT JUST DIDN'T LAST AND YOU DIDN'T HAVE MONEY TO GET MORE.: NEVER TRUE

## 2022-06-13 SDOH — ECONOMIC STABILITY: FOOD INSECURITY: WITHIN THE PAST 12 MONTHS, YOU WORRIED THAT YOUR FOOD WOULD RUN OUT BEFORE YOU GOT MONEY TO BUY MORE.: NEVER TRUE

## 2022-06-13 SDOH — ECONOMIC STABILITY: HOUSING INSECURITY
IN THE LAST 12 MONTHS, WAS THERE A TIME WHEN YOU DID NOT HAVE A STEADY PLACE TO SLEEP OR SLEPT IN A SHELTER (INCLUDING NOW)?: NO

## 2022-06-13 SDOH — ECONOMIC STABILITY: HOUSING INSECURITY: IN THE LAST 12 MONTHS, HOW MANY PLACES HAVE YOU LIVED?: 1

## 2022-06-13 SDOH — HEALTH STABILITY: PHYSICAL HEALTH: ON AVERAGE, HOW MANY DAYS PER WEEK DO YOU ENGAGE IN MODERATE TO STRENUOUS EXERCISE (LIKE A BRISK WALK)?: 7 DAYS

## 2022-06-13 SDOH — ECONOMIC STABILITY: INCOME INSECURITY: IN THE LAST 12 MONTHS, WAS THERE A TIME WHEN YOU WERE NOT ABLE TO PAY THE MORTGAGE OR RENT ON TIME?: NO

## 2022-06-13 SDOH — ECONOMIC STABILITY: TRANSPORTATION INSECURITY
IN THE PAST 12 MONTHS, HAS THE LACK OF TRANSPORTATION KEPT YOU FROM MEDICAL APPOINTMENTS OR FROM GETTING MEDICATIONS?: NO

## 2022-06-13 SDOH — ECONOMIC STABILITY: INCOME INSECURITY: HOW HARD IS IT FOR YOU TO PAY FOR THE VERY BASICS LIKE FOOD, HOUSING, MEDICAL CARE, AND HEATING?: NOT HARD AT ALL

## 2022-06-13 SDOH — HEALTH STABILITY: PHYSICAL HEALTH: ON AVERAGE, HOW MANY MINUTES DO YOU ENGAGE IN EXERCISE AT THIS LEVEL?: 90 MIN

## 2022-06-13 SDOH — HEALTH STABILITY: MENTAL HEALTH
STRESS IS WHEN SOMEONE FEELS TENSE, NERVOUS, ANXIOUS, OR CAN'T SLEEP AT NIGHT BECAUSE THEIR MIND IS TROUBLED. HOW STRESSED ARE YOU?: NOT AT ALL

## 2022-06-13 SDOH — ECONOMIC STABILITY: TRANSPORTATION INSECURITY
IN THE PAST 12 MONTHS, HAS LACK OF RELIABLE TRANSPORTATION KEPT YOU FROM MEDICAL APPOINTMENTS, MEETINGS, WORK OR FROM GETTING THINGS NEEDED FOR DAILY LIVING?: NO

## 2022-06-13 SDOH — ECONOMIC STABILITY: TRANSPORTATION INSECURITY
IN THE PAST 12 MONTHS, HAS LACK OF TRANSPORTATION KEPT YOU FROM MEETINGS, WORK, OR FROM GETTING THINGS NEEDED FOR DAILY LIVING?: NO

## 2022-06-13 ASSESSMENT — SOCIAL DETERMINANTS OF HEALTH (SDOH)
HOW OFTEN DO YOU ATTENT MEETINGS OF THE CLUB OR ORGANIZATION YOU BELONG TO?: 1 TO 4 TIMES PER YEAR
IN A TYPICAL WEEK, HOW MANY TIMES DO YOU TALK ON THE PHONE WITH FAMILY, FRIENDS, OR NEIGHBORS?: PATIENT DECLINED
HOW OFTEN DO YOU ATTENT MEETINGS OF THE CLUB OR ORGANIZATION YOU BELONG TO?: 1 TO 4 TIMES PER YEAR
HOW OFTEN DO YOU GET TOGETHER WITH FRIENDS OR RELATIVES?: ONCE A WEEK
DO YOU BELONG TO ANY CLUBS OR ORGANIZATIONS SUCH AS CHURCH GROUPS UNIONS, FRATERNAL OR ATHLETIC GROUPS, OR SCHOOL GROUPS?: YES
HOW OFTEN DO YOU HAVE A DRINK CONTAINING ALCOHOL: 2-4 TIMES A MONTH
HOW HARD IS IT FOR YOU TO PAY FOR THE VERY BASICS LIKE FOOD, HOUSING, MEDICAL CARE, AND HEATING?: NOT HARD AT ALL
HOW OFTEN DO YOU ATTEND CHURCH OR RELIGIOUS SERVICES?: NEVER
HOW OFTEN DO YOU ATTEND CHURCH OR RELIGIOUS SERVICES?: NEVER
HOW OFTEN DO YOU GET TOGETHER WITH FRIENDS OR RELATIVES?: ONCE A WEEK
WITHIN THE PAST 12 MONTHS, YOU WORRIED THAT YOUR FOOD WOULD RUN OUT BEFORE YOU GOT THE MONEY TO BUY MORE: NEVER TRUE
DO YOU BELONG TO ANY CLUBS OR ORGANIZATIONS SUCH AS CHURCH GROUPS UNIONS, FRATERNAL OR ATHLETIC GROUPS, OR SCHOOL GROUPS?: YES
IN A TYPICAL WEEK, HOW MANY TIMES DO YOU TALK ON THE PHONE WITH FAMILY, FRIENDS, OR NEIGHBORS?: PATIENT DECLINED
HOW OFTEN DO YOU HAVE SIX OR MORE DRINKS ON ONE OCCASION: NEVER
HOW MANY DRINKS CONTAINING ALCOHOL DO YOU HAVE ON A TYPICAL DAY WHEN YOU ARE DRINKING: 1 OR 2

## 2022-06-13 ASSESSMENT — LIFESTYLE VARIABLES
HOW MANY STANDARD DRINKS CONTAINING ALCOHOL DO YOU HAVE ON A TYPICAL DAY: 1 OR 2
SKIP TO QUESTIONS 9-10: 1
AUDIT-C TOTAL SCORE: 2
HOW OFTEN DO YOU HAVE SIX OR MORE DRINKS ON ONE OCCASION: NEVER
HOW OFTEN DO YOU HAVE A DRINK CONTAINING ALCOHOL: 2-4 TIMES A MONTH

## 2022-06-16 ENCOUNTER — OFFICE VISIT (OUTPATIENT)
Dept: MEDICAL GROUP | Facility: MEDICAL CENTER | Age: 77
End: 2022-06-16
Payer: MEDICARE

## 2022-06-16 VITALS
HEART RATE: 73 BPM | SYSTOLIC BLOOD PRESSURE: 124 MMHG | TEMPERATURE: 98.7 F | DIASTOLIC BLOOD PRESSURE: 64 MMHG | OXYGEN SATURATION: 93 % | BODY MASS INDEX: 32.68 KG/M2 | RESPIRATION RATE: 18 BRPM | WEIGHT: 241.29 LBS | HEIGHT: 72 IN

## 2022-06-16 DIAGNOSIS — I10 ESSENTIAL HYPERTENSION: ICD-10-CM

## 2022-06-16 DIAGNOSIS — E78.00 ELEVATED LDL CHOLESTEROL LEVEL: ICD-10-CM

## 2022-06-16 DIAGNOSIS — Z01.818 PREOP EXAMINATION: ICD-10-CM

## 2022-06-16 DIAGNOSIS — N18.30 STAGE 3 CHRONIC KIDNEY DISEASE, UNSPECIFIED WHETHER STAGE 3A OR 3B CKD: ICD-10-CM

## 2022-06-16 DIAGNOSIS — R73.03 PREDIABETES: ICD-10-CM

## 2022-06-16 DIAGNOSIS — M17.11 OSTEOARTHRITIS OF RIGHT KNEE, UNSPECIFIED OSTEOARTHRITIS TYPE: ICD-10-CM

## 2022-06-16 PROBLEM — M79.601 RIGHT ARM PAIN: Status: RESOLVED | Noted: 2021-01-05 | Resolved: 2022-06-16

## 2022-06-16 PROBLEM — M79.671 RIGHT FOOT PAIN: Status: RESOLVED | Noted: 2021-04-26 | Resolved: 2022-06-16

## 2022-06-16 PROBLEM — R10.31 RIGHT LOWER QUADRANT PAIN: Status: RESOLVED | Noted: 2020-02-10 | Resolved: 2022-06-16

## 2022-06-16 PROBLEM — R10.31 RIGHT GROIN PAIN: Status: RESOLVED | Noted: 2020-06-12 | Resolved: 2022-06-16

## 2022-06-16 PROCEDURE — 99214 OFFICE O/P EST MOD 30 MIN: CPT | Performed by: PHYSICIAN ASSISTANT

## 2022-06-16 ASSESSMENT — FIBROSIS 4 INDEX: FIB4 SCORE: 0.87

## 2022-06-16 NOTE — PROGRESS NOTES
Preoperative Exam    06/16/22  Primary Care Physician: Hue Alvarez M.D.    ID: Valeriy Orlando is a 76 y.o. male who presents for a preoperative exam.  Patient could not get to see his PCP for preop clearance for 6 weeks and could not wait that long.    Current Outpatient Medications on File Prior to Visit   Medication Sig Dispense Refill   • rosuvastatin (CRESTOR) 10 MG Tab TAKE 1 TABLET BY MOUTH EVERY DAY IN THE EVENING 90 Tablet 3   • lisinopril (PRINIVIL) 10 MG Tab TAKE 1 TABLET BY MOUTH EVERY DAY 90 Tablet 3   • omeprazole (PRILOSEC) 20 MG delayed-release capsule TAKE 1 CAP BY MOUTH AS NEEDED DAILY FOR HEARTBURN 90 Capsule 3   • polyethylene glycol/lytes (MIRALAX) 17 g Pack Take 17 g by mouth every day.     • Probiotic Product (PROBIOTIC-10) Cap Take 1 Cap by mouth every day.     • multivitamin (THERAGRAN) Tab Take 1 Tab by mouth every day.     • amitriptyline (ELAVIL) 25 MG Tab amitriptyline 25 mg tablet (Patient not taking: Reported on 6/2/2022)     • diclofenac sodium (VOLTAREN) 1 % Gel diclofenac 1 % topical gel (Patient not taking: Reported on 6/2/2022)     • fluorouracil (EFUDEX) 5 % cream      • gabapentin (NEURONTIN) 300 MG Cap gabapentin 300 mg capsule   TAKE 1 CAPSULE BY MOUTH THREE TIMES A DAY (Patient not taking: Reported on 6/2/2022)     • tizanidine (ZANAFLEX) 4 MG Tab tizanidine 4 mg tablet   TAKE 1 TABLET BY MOUTH EVERY DAY AT BEDTIME AS NEEDED (Patient not taking: Reported on 1/24/2022)     • traZODone (DESYREL) 50 MG Tab trazodone 50 mg tablet   TAKE 1/2 TO 1 TAB BY MOUTH AT BEDTIME AS NEEDED FOR INSOMNIA (Patient not taking: Reported on 9/7/2021)       No current facility-administered medications on file prior to visit.       Past Medical History:   Diagnosis Date   • Depression     pre diabetes   • Diabetes (HCC)     prediabetic   • Heart burn     GERD   • Hepatitis A 2001   • High cholesterol     patient denies, states medication is for pre-diabetes   • Hypertension     patient  denies, states lisinopril is for pre-diabetes   • Murmur    • Prediabetes    • Sleep apnea     cpap in use   • Snoring        Allergies: Ibuprofen    Surgical History:  has a past surgical history that includes other orthopedic surgery (1990); lumbar laminectomy diskectomy (11/29/2016); knee arthroscopy; groin exploration; lumbar laminectomy diskectomy (9/4/2018); lumbar laminectomy diskectomy (Bilateral, 10/3/2019); and fusion, spine, lumbar, plif (Bilateral, 10/3/2019).    Family History: family history includes Cancer in his mother; Diabetes in his paternal grandfather; Heart Disease in his maternal grandmother and mother.    Social History:  reports that he has never smoked. He has never used smokeless tobacco. He reports current alcohol use of about 0.6 oz of alcohol per week. He reports previous drug use.    Planned Surgery: R knee replacement        Functional Capacity:   > 4 MET    Examples of activities associated with <4 METs are slow ballroom dancing, golfing with a cart, playing a musical instrument, and walking at approximately 2 mph to 3 mph.    Examples of activities associated with >4 METs are climbing a flight of stairs or walking up a hill, walking on level ground at 4 mph, and performing heavy work around the house.      Review of Systems:  No SOB or CP,     Objective:   /64 (BP Location: Left arm, Patient Position: Sitting, BP Cuff Size: Large adult)   Pulse 73   Temp 37.1 °C (98.7 °F) (Temporal)   Resp 18   Ht 1.829 m (6')   Wt 109 kg (241 lb 4.7 oz)   SpO2 93%   BMI 32.73 kg/m²   Physical Exam:   Constitutional: vital signs reviewed.     Constitutional: Alert, no distress.  Skin: Warm, dry, good turgor, no rashes in visible areas.  Eye: Equal, round and reactive, conjunctiva clear, lids normal.  Respiratory: Unlabored respiratory effort, lungs clear to auscultation, no wheezes, no ronchi.  Cardiovascular: Normal S1, S2, no murmur, no edema.          Assessment and Plan:     1.  Stage 3 chronic kidney disease, unspecified whether stage 3a or 3b CKD (HCC)  Last GFR 55, stable    2. Prediabetes  Last A1c 6.2    3. Osteoarthritis of right knee, unspecified osteoarthritis type    Patient is getting total right knee replacement by Dr. Charly Warner    4. Essential hypertension  Controlled    5. Elevated LDL cholesterol level      6. Preop examination    the goal of the preoperative exam is to optimize conditions that increase perioperative morbidity and mortality. I discussed with the patient that no amount of testing or intervention prior to surgery eliminates all surgical risk.      Blood pressure is well controlled.  A1c well controlled   Continue current medications at current dose as above.      Patient is not in acute coronary syndrome presentation, is not having any active TIA or stroke symptoms, as not having decompensated heart failure, is not symptomatic in terms of presyncope or syncope.   Patient remains low to moderate cardiovascular risk for his  intended orthopedic surgery     Patient was very unhappy that he could not have an appointment with his PCP for preop clearance for 6 weeks    F/u prn

## 2022-06-16 NOTE — Clinical Note
Please inform patient that  his pre-op clearance is ready.  The surgeon is Dr. Charly Warner.  Please clarify if they are going to access it through [facility] or if they want us to fax it to them.  Karen Shetty P.A.-C.

## 2022-08-01 RX ORDER — OMEPRAZOLE 20 MG/1
CAPSULE, DELAYED RELEASE ORAL
Qty: 90 CAPSULE | Refills: 3 | Status: SHIPPED | OUTPATIENT
Start: 2022-08-01 | End: 2023-11-13 | Stop reason: SDUPTHER

## 2022-08-01 NOTE — TELEPHONE ENCOUNTER
Received request via: Pharmacy    Was the patient seen in the last year in this department? Yes    Does the patient have an active prescription (recently filled or refills available) for medication(s) requested? No     Future Appointments       Provider Department Center    8/10/2022 1:30 PM Charly Warner M.D. [facility]

## 2022-08-03 DIAGNOSIS — M1A.9XX0 CHRONIC GOUT WITHOUT TOPHUS, UNSPECIFIED CAUSE, UNSPECIFIED SITE: ICD-10-CM

## 2022-08-04 RX ORDER — ALLOPURINOL 300 MG/1
300 TABLET ORAL DAILY
Qty: 90 TABLET | Refills: 1 | Status: SHIPPED | OUTPATIENT
Start: 2022-08-04 | End: 2022-12-20

## 2022-11-10 ENCOUNTER — PATIENT MESSAGE (OUTPATIENT)
Dept: HEALTH INFORMATION MANAGEMENT | Facility: OTHER | Age: 77
End: 2022-11-10

## 2022-12-14 DIAGNOSIS — I10 ESSENTIAL HYPERTENSION: ICD-10-CM

## 2022-12-14 RX ORDER — LISINOPRIL 10 MG/1
10 TABLET ORAL
Qty: 90 TABLET | Refills: 3 | OUTPATIENT
Start: 2022-12-14

## 2022-12-14 RX ORDER — LISINOPRIL 10 MG/1
10 TABLET ORAL
Qty: 90 TABLET | Refills: 3 | Status: SHIPPED | OUTPATIENT
Start: 2022-12-14 | End: 2023-11-14 | Stop reason: SDUPTHER

## 2022-12-20 DIAGNOSIS — M1A.9XX0 CHRONIC GOUT WITHOUT TOPHUS, UNSPECIFIED CAUSE, UNSPECIFIED SITE: ICD-10-CM

## 2022-12-20 RX ORDER — ALLOPURINOL 300 MG/1
300 TABLET ORAL DAILY
Qty: 90 TABLET | Refills: 1 | Status: SHIPPED | OUTPATIENT
Start: 2022-12-20 | End: 2023-11-13 | Stop reason: SDUPTHER

## 2023-01-20 DIAGNOSIS — E78.00 ELEVATED LDL CHOLESTEROL LEVEL: ICD-10-CM

## 2023-01-20 RX ORDER — ROSUVASTATIN CALCIUM 10 MG/1
TABLET, COATED ORAL
Qty: 90 TABLET | Refills: 3 | Status: SHIPPED | OUTPATIENT
Start: 2023-01-20 | End: 2024-03-05

## 2023-02-15 ENCOUNTER — OFFICE VISIT (OUTPATIENT)
Dept: MEDICAL GROUP | Facility: MEDICAL CENTER | Age: 78
End: 2023-02-15
Payer: MEDICARE

## 2023-02-15 VITALS
SYSTOLIC BLOOD PRESSURE: 128 MMHG | DIASTOLIC BLOOD PRESSURE: 80 MMHG | RESPIRATION RATE: 14 BRPM | HEIGHT: 72 IN | BODY MASS INDEX: 34.67 KG/M2 | WEIGHT: 256 LBS | OXYGEN SATURATION: 95 % | TEMPERATURE: 98 F | HEART RATE: 67 BPM

## 2023-02-15 DIAGNOSIS — G56.03 BILATERAL CARPAL TUNNEL SYNDROME: ICD-10-CM

## 2023-02-15 DIAGNOSIS — Z11.59 NEED FOR HEPATITIS C SCREENING TEST: ICD-10-CM

## 2023-02-15 DIAGNOSIS — E78.00 ELEVATED LDL CHOLESTEROL LEVEL: ICD-10-CM

## 2023-02-15 DIAGNOSIS — K22.70 BARRETT'S ESOPHAGUS WITHOUT DYSPLASIA: ICD-10-CM

## 2023-02-15 DIAGNOSIS — I10 ESSENTIAL HYPERTENSION: ICD-10-CM

## 2023-02-15 PROCEDURE — 99214 OFFICE O/P EST MOD 30 MIN: CPT | Performed by: STUDENT IN AN ORGANIZED HEALTH CARE EDUCATION/TRAINING PROGRAM

## 2023-02-15 ASSESSMENT — ENCOUNTER SYMPTOMS
ABDOMINAL PAIN: 0
SHORTNESS OF BREATH: 0
FEVER: 0
NAUSEA: 0
VOMITING: 0
TINGLING: 1
CHILLS: 0
COUGH: 0
FOCAL WEAKNESS: 0

## 2023-02-15 ASSESSMENT — FIBROSIS 4 INDEX: FIB4 SCORE: 0.88

## 2023-02-15 ASSESSMENT — PATIENT HEALTH QUESTIONNAIRE - PHQ9: CLINICAL INTERPRETATION OF PHQ2 SCORE: 0

## 2023-02-15 NOTE — PROGRESS NOTES
Subjective:     CC: establish care    HPI:   Valeriy presents today with    Problem   Bilateral Carpal Tunnel Syndrome    Patient reports b/l tingling in hands     Elevated Ldl Cholesterol Level    Chronic, stable     Essential Hypertension    Chronic, stable         Health Maintenance: Completed    ROS:  Review of Systems   Constitutional:  Negative for chills and fever.   Respiratory:  Negative for cough and shortness of breath.    Cardiovascular:  Negative for chest pain and leg swelling.   Gastrointestinal:  Negative for abdominal pain, nausea and vomiting.   Neurological:  Positive for tingling (b/l hands). Negative for focal weakness.     Objective:     Exam:  /80 (BP Location: Right arm, Patient Position: Sitting, BP Cuff Size: Adult)   Pulse 67   Temp 36.7 °C (98 °F) (Temporal)   Resp 14   Ht 1.829 m (6')   Wt 116 kg (256 lb)   SpO2 95%   BMI 34.72 kg/m²  Body mass index is 34.72 kg/m².    Physical Exam  Vitals reviewed.   HENT:      Head: Normocephalic.      Mouth/Throat:      Mouth: Mucous membranes are moist.      Pharynx: Oropharynx is clear.   Eyes:      Pupils: Pupils are equal, round, and reactive to light.   Cardiovascular:      Rate and Rhythm: Normal rate and regular rhythm.   Pulmonary:      Effort: Pulmonary effort is normal. No respiratory distress.      Breath sounds: No wheezing or rales.   Abdominal:      General: Abdomen is flat. Bowel sounds are normal. There is no distension.      Tenderness: There is no abdominal tenderness. There is no guarding.   Skin:     General: Skin is warm.   Neurological:      General: No focal deficit present.      Mental Status: He is alert and oriented to person, place, and time.      Comments: Phalen test positive b/l     Labs: reviewed    Assessment & Plan:     77 y.o. male with the following -     1. Essential hypertension  Chronic, stable.  - lisinopril 10 mg    2. King's esophagus without dysplasia  Chronic, stable.  - omeprazole 20 mg    3.  Elevated LDL cholesterol level  Chronic, stable.  - rosuvastatin 10 mg    4. Need for hepatitis C screening test  - HCV Scrn ( [id number] [employer]); Future    5. Bilateral carpal tunnel syndrome  Recommended to wear b/l wrist splints at night.      HCC Gap Form    Last edited 02/15/23 11:32 PST by Chase Montez M.D.       I spent a total of 35 minutes with record review, exam, communication with the patient, communication with other providers, and documentation of this encounter.      Return in about 1 week (around 2023) for Medicare wellness.    Please note that this dictation was created using voice recognition software. I have made every reasonable attempt to correct obvious errors, but I expect that there are errors of grammar and possibly content that I did not discover before finalizing the note.

## 2023-02-21 ENCOUNTER — OFFICE VISIT (OUTPATIENT)
Dept: MEDICAL GROUP | Facility: MEDICAL CENTER | Age: 78
End: 2023-02-21
Payer: MEDICARE

## 2023-02-21 VITALS
BODY MASS INDEX: 34.46 KG/M2 | DIASTOLIC BLOOD PRESSURE: 82 MMHG | HEART RATE: 79 BPM | OXYGEN SATURATION: 91 % | RESPIRATION RATE: 20 BRPM | TEMPERATURE: 96.9 F | WEIGHT: 254.4 LBS | HEIGHT: 72 IN | SYSTOLIC BLOOD PRESSURE: 122 MMHG

## 2023-02-21 DIAGNOSIS — E78.00 ELEVATED LDL CHOLESTEROL LEVEL: ICD-10-CM

## 2023-02-21 DIAGNOSIS — R73.03 PREDIABETES: ICD-10-CM

## 2023-02-21 DIAGNOSIS — K22.70 BARRETT'S ESOPHAGUS WITHOUT DYSPLASIA: ICD-10-CM

## 2023-02-21 DIAGNOSIS — I10 ESSENTIAL HYPERTENSION: ICD-10-CM

## 2023-02-21 DIAGNOSIS — G47.33 OSA (OBSTRUCTIVE SLEEP APNEA): ICD-10-CM

## 2023-02-21 DIAGNOSIS — Z98.890 S/P LUMBAR LAMINECTOMY: ICD-10-CM

## 2023-02-21 PROCEDURE — G0439 PPPS, SUBSEQ VISIT: HCPCS | Performed by: STUDENT IN AN ORGANIZED HEALTH CARE EDUCATION/TRAINING PROGRAM

## 2023-02-21 RX ORDER — CEPHALEXIN 500 MG/1
CAPSULE ORAL
COMMUNITY
Start: 2022-12-15 | End: 2023-02-21

## 2023-02-21 ASSESSMENT — ENCOUNTER SYMPTOMS: GENERAL WELL-BEING: GOOD

## 2023-02-21 ASSESSMENT — PATIENT HEALTH QUESTIONNAIRE - PHQ9: CLINICAL INTERPRETATION OF PHQ2 SCORE: 0

## 2023-02-21 ASSESSMENT — ACTIVITIES OF DAILY LIVING (ADL): BATHING_REQUIRES_ASSISTANCE: 0

## 2023-02-21 ASSESSMENT — FIBROSIS 4 INDEX: FIB4 SCORE: 0.88

## 2023-02-21 NOTE — PROGRESS NOTES
Chief Complaint   Patient presents with    Medicare Annual Wellness       HPI:  Valeriy Orlando is a 77 y.o. here for Medicare Annual Wellness Visit     Patient Active Problem List    Diagnosis Date Noted    Bilateral carpal tunnel syndrome 02/15/2023    Osteoarthritis of right knee 06/02/2022    King's esophagus without dysplasia 07/15/2019    Stage 3 chronic kidney disease (HCC) 02/21/2019    S/P lumbar laminectomy 09/12/2018    Chronic right-sided low back pain without sciatica 07/06/2018    Elevated LDL cholesterol level 05/09/2018    Nonrheumatic aortic valve insufficiency 09/13/2017    Abnormal EKG 06/06/2017    Essential hypertension 05/03/2017    Prediabetes 05/03/2017    Class 1 obesity due to excess calories without serious comorbidity with body mass index (BMI) of 32.0 to 32.9 in adult 05/03/2017    MAVERICK (obstructive sleep apnea) 05/03/2017       Current Outpatient Medications   Medication Sig Dispense Refill    Continuous Blood Gluc Sensor (FREESTYLE JASMIN 2 SENSOR) Misc USE AS DIRECTED      rosuvastatin (CRESTOR) 10 MG Tab TAKE 1 TABLET BY MOUTH EVERY DAY IN THE EVENING 90 Tablet 3    allopurinol (ZYLOPRIM) 300 MG Tab TAKE 1 TABLET BY MOUTH EVERY DAY 90 Tablet 1    lisinopril (PRINIVIL) 10 MG Tab Take 1 Tablet by mouth every day. 90 Tablet 3    omeprazole (PRILOSEC) 20 MG delayed-release capsule TAKE 1 CAP BY MOUTH AS NEEDED DAILY FOR HEARTBURN 90 Capsule 3    polyethylene glycol/lytes (MIRALAX) 17 g Pack Take 17 g by mouth every day.      Probiotic Product (PROBIOTIC-10) Cap Take 1 Cap by mouth every day.      multivitamin (THERAGRAN) Tab Take 1 Tab by mouth every day.      fluorouracil (EFUDEX) 5 % cream  (Patient not taking: Reported on 2/15/2023)       No current facility-administered medications for this visit.          Current supplements as per medication list.     Allergies: Ibuprofen    Current social contact/activities: Visiting family & friends     He  reports that he has never smoked. He  has never used smokeless tobacco. He reports current alcohol use of about 0.6 oz per week. He reports that he does not currently use drugs.  Counseling given: Not Answered      ROS:    Gait: Uses a walker, at night only, has two little dogs  Ostomy: No  Other tubes: No  Amputations: No  Chronic oxygen use: No uses only CPAP at night without oxygen  Last eye exam: 2 yrs ago  Wears hearing aids: No   : Denies any urinary leakage during the last 6 months    Screening:    Depression Screening  Little interest or pleasure in doing things?  0 - not at all  Feeling down, depressed , or hopeless? 0 - not at all  Patient Health Questionnaire Score: 0     If depressive symptoms identified deferred to follow up visit unless specifically addressed in assessment and plan.    Interpretation of PHQ-9 Total Score   Score Severity   1-4 No Depression   5-9 Mild Depression   10-14 Moderate Depression   15-19 Moderately Severe Depression   20-27 Severe Depression    Screening for Cognitive Impairment  Three Minute Recall (daughter, heaven, mountain) 3/3 jame Warner, ferdinand  Clark clock face with all 12 numbers and set the hands to show 10 past 11.  Yes 11:10 3/5  Cognitive concerns identified deferred for follow up unless specifically addressed in assessment and plan.    Fall Risk Assessment  Has the patient had two or more falls in the last year or any fall with injury in the last year?  No    Safety Assessment  Throw rugs on floor.  No  Handrails on all stairs.  No, has only two steps  Good lighting in all hallways.  Yes  Difficulty hearing.  No  Patient counseled about all safety risks that were identified.    Functional Assessment ADLs  Are there any barriers preventing you from cooking for yourself or meeting nutritional needs?  No.    Are there any barriers preventing you from driving safely or obtaining transportation?  No.    Are there any barriers preventing you from using a telephone or calling for help?  No.    Are  there any barriers preventing you from shopping?  No.    Are there any barriers preventing you from taking care of your own finances?  No.    Are there any barriers preventing you from managing your medications?  No.    Are there any barriers preventing you from showering, bathing or dressing yourself?  No.    Are you currently engaging in any exercise or physical activity?  Yes.  30-45 minutes on bike, weight lifting for 20 minutes, daily  What is your perception of your health?  Good    Advance Care Planning  Do you have an Advance Directive, Living Will, Durable Power of , or POLST? No                 Health Maintenance Summary            Ordered - HEPATITIS C SCREENING (Once) Ordered on 2/15/2023      No completion history exists for this topic.              Postponed - IMM INFLUENZA (1) Postponed until 2/15/2024      10/19/2020  Imm Admin: Influenza Vaccine Adult HD    09/24/2019  Imm Admin: Influenza Vaccine Adult HD    09/12/2018  Imm Admin: Influenza Vaccine Adult HD    09/13/2017  Imm Admin: Influenza Vaccine Adult HD    11/19/2013  Imm Admin: Influenza Vaccine Quad Inj (Preserved)    Only the first 5 history entries have been loaded, but more history exists.              Postponed - COVID-19 Vaccine (3 - Booster for Pfizer series) Postponed until 2/15/2024      03/06/2021  Imm Admin: PFIZER PURPLE CAP SARS-COV-2 VACCINATION (12+)    02/16/2021  Imm Admin: PFIZER PURPLE CAP SARS-COV-2 VACCINATION (12+)              Annual Wellness Visit (Every 366 Days) Next due on 2/22/2024 02/21/2023  Level of Service: ANNUAL WELLNESS VISIT-INCLUDES PPPS SUBSEQUE*    05/09/2018  Visit Dx: Medicare annual wellness visit, subsequent              IMM DTaP/Tdap/Td Vaccine (5 - Td or Tdap) Next due on 12/14/2032 12/14/2022  Imm Admin: Tdap Vaccine    05/10/2017  Imm Admin: Tdap Vaccine    01/19/2011  Imm Admin: Tdap Vaccine    11/07/2000  Imm Admin: Tdap Vaccine              IMM PNEUMOCOCCAL VACCINE: 65+  Years (Series Information) Completed      07/15/2019  Imm Admin: Pneumococcal polysaccharide vaccine (PPSV-23)    05/10/2017  Imm Admin: Pneumococcal Conjugate Vaccine (Prevnar/PCV-13)    08/07/2008  Imm Admin: Pneumococcal polysaccharide vaccine (PPSV-23)              IMM ZOSTER VACCINES (Series Information) Completed      07/13/2021  Imm Admin: Zoster Vaccine Recombinant (RZV) (SHINGRIX)    04/26/2021  Imm Admin: Zoster Vaccine Recombinant (RZV) (SHINGRIX)              IMM HEP B VACCINE (Series Information) Aged Out      No completion history exists for this topic.              IMM MENINGOCOCCAL ACWY VACCINE (Series Information) Aged Out      No completion history exists for this topic.              Discontinued - COLORECTAL CANCER SCREENING  Discontinued        Frequency changed to Never automatically (Topic No Longer Applies)    01/11/2021  REFERRAL TO GI FOR COLONOSCOPY    07/19/2018  REFERRAL TO GI FOR COLONOSCOPY    01/02/2014  REFERRAL TO GI FOR COLONOSCOPY                    Patient Care Team:  Chase Montez M.D. as PCP - General (Internal Medicine)        Social History     Tobacco Use    Smoking status: Never    Smokeless tobacco: Never   Vaping Use    Vaping Use: Never used   Substance Use Topics    Alcohol use: Yes     Alcohol/week: 0.6 oz     Types: 1 Glasses of wine per week     Comment: glass of wine per week    Drug use: Not Currently     Comment: Uses CBD oil - last use 9/26/19     Family History   Problem Relation Age of Onset    Heart Disease Mother     Cancer Mother         lung cancer    Heart Disease Maternal Grandmother     Diabetes Paternal Grandfather     Kidney Disease Neg Hx     Ovarian Cancer Neg Hx     Tubal Cancer Neg Hx     Peritoneal Cancer Neg Hx     Colorectal Cancer Neg Hx     Breast Cancer Neg Hx     Hypertension Neg Hx     Hyperlipidemia Neg Hx     Stroke Neg Hx      He  has a past medical history of Depression, Diabetes (HCC), Heart burn, Hepatitis A (2001), High  "cholesterol, Hypertension, Murmur, Prediabetes, S/P insertion of spinal cord stimulator, Sleep apnea, and Snoring.   Past Surgical History:   Procedure Laterality Date    PB TOTAL KNEE ARTHROPLASTY Right 6/27/2022    Procedure: RIGHT TOTAL KNEE ARTHROPLASTY;  Surgeon: Charly Warner M.D.;  Location: [facility];  Service: Orthopedics    LUMBAR LAMINECTOMY DISKECTOMY Bilateral 10/3/2019    Procedure: LAMINECTOMY, SPINE, LUMBAR, WITH DISCECTOMY;  Surgeon: Kyler Juares M.D.;  Location: [facility];  Service: Neurosurgery    FUSION, SPINE, LUMBAR, PLIF Bilateral 10/3/2019    Procedure: FUSION, SPINE, LUMBAR, PLIF- REDO L4-5 WITH TLIF;  Surgeon: Kyler Juares M.D.;  Location: [facility];  Service: Neurosurgery    LUMBAR LAMINECTOMY DISKECTOMY  9/4/2018    Procedure: LUMBAR LAMINECTOMY DISKECTOMY-  L2-3 OPEN LAMI AND REDO L3-4 LAMI;  Surgeon: Bentley Claudio M.D.;  Location: [facility];  Service: Neurosurgery    LUMBAR LAMINECTOMY DISKECTOMY  11/29/2016    Procedure: LUMBAR LAMINECTOMY DISKECTOMY L3-5 open laminectomy ;  Surgeon: Bentley Claudio M.D.;  Location: [facility];  Service:     OTHER ORTHOPEDIC SURGERY  1990    right knee scope    GROIN EXPLORATION      growth    KNEE ARTHROSCOPY         Exam:   /82 (BP Location: Right arm, Patient Position: Sitting, BP Cuff Size: Large adult long)   Pulse 79   Temp 36.1 °C (96.9 °F) (Temporal)   Resp 20   Ht 1.829 m (6' 0.01\")   Wt 115 kg (254 lb 6.4 oz)   SpO2 91%  Body mass index is 34.5 kg/m².    Hearing good.    Dentition good  Alert, oriented in no acute distress.  Eye contact is good, speech goal directed, affect calm    Assessment and Plan. The following treatment and monitoring plan is recommended:      1. Essential hypertension  Chronic, stable.  - Lisinopril 10 mg    2. MAVERICK (obstructive sleep apnea)  Chronic, stable.  Uses CPAP at night    3. Elevated LDL cholesterol " level  Chronic, stable.  - Rosuvastatin 10 mg    4. S/P lumbar laminectomy  Chronic, stable    5. King's esophagus without dysplasia  Chronic, stable  - Omeprazole 20 mg    6. Prediabetes  Chronic, stable.  Commended healthy diet and regular exercise      Services suggested: No services needed at this time  Health Care Screening: Age-appropriate preventive services recommended by USPTF and ACIP covered by Medicare were discussed today. Services ordered if indicated and agreed upon by the patient.  Referrals offered: Community-based lifestyle interventions to reduce health risks and promote self-management and wellness, fall prevention, nutrition, physical activity, tobacco-use cessation, weight loss, and mental health services as per orders if indicated.    Discussion today about general wellness and lifestyle habits:    Prevent falls and reduce trip hazards; Cautioned about securing or removing rugs.  Have a working fire alarm and carbon monoxide detector;   Engage in regular physical activity and social activities     Follow-up: Return in about 6 months (around 8/21/2023) for F/u labs.

## 2023-02-27 ENCOUNTER — HOSPITAL ENCOUNTER (OUTPATIENT)
Dept: LAB | Facility: MEDICAL CENTER | Age: 78
End: 2023-02-27
Attending: PHYSICIAN ASSISTANT
Payer: MEDICARE

## 2023-02-27 ENCOUNTER — HOSPITAL ENCOUNTER (OUTPATIENT)
Dept: LAB | Facility: MEDICAL CENTER | Age: 78
End: 2023-02-27
Attending: STUDENT IN AN ORGANIZED HEALTH CARE EDUCATION/TRAINING PROGRAM
Payer: MEDICARE

## 2023-02-27 DIAGNOSIS — Z11.59 NEED FOR HEPATITIS C SCREENING TEST: ICD-10-CM

## 2023-02-27 LAB
HCV AB SER QL: NORMAL
PSA SERPL-MCNC: 1.72 NG/ML (ref 0–4)

## 2023-02-27 PROCEDURE — G0472 HEP C SCREEN HIGH RISK/OTHER: HCPCS | Mod: GY

## 2023-02-27 PROCEDURE — 84153 ASSAY OF PSA TOTAL: CPT | Mod: GA

## 2023-02-27 PROCEDURE — 36415 COLL VENOUS BLD VENIPUNCTURE: CPT | Mod: GY

## 2023-05-10 ENCOUNTER — HOSPITAL ENCOUNTER (OUTPATIENT)
Dept: LAB | Facility: MEDICAL CENTER | Age: 78
End: 2023-05-10
Attending: INTERNAL MEDICINE
Payer: MEDICARE

## 2023-05-10 LAB
BASOPHILS # BLD AUTO: 1.1 % (ref 0–1.8)
BASOPHILS # BLD: 0.07 K/UL (ref 0–0.12)
EOSINOPHIL # BLD AUTO: 0.61 K/UL (ref 0–0.51)
EOSINOPHIL NFR BLD: 9.2 % (ref 0–6.9)
ERYTHROCYTE [DISTWIDTH] IN BLOOD BY AUTOMATED COUNT: 53.9 FL (ref 35.9–50)
EST. AVERAGE GLUCOSE BLD GHB EST-MCNC: 131 MG/DL
HBA1C MFR BLD: 6.2 % (ref 4–5.6)
HCT VFR BLD AUTO: 48.1 % (ref 42–52)
HGB BLD-MCNC: 16 G/DL (ref 14–18)
IMM GRANULOCYTES # BLD AUTO: 0.03 K/UL (ref 0–0.11)
IMM GRANULOCYTES NFR BLD AUTO: 0.5 % (ref 0–0.9)
LYMPHOCYTES # BLD AUTO: 1.53 K/UL (ref 1–4.8)
LYMPHOCYTES NFR BLD: 23.2 % (ref 22–41)
MCH RBC QN AUTO: 32.8 PG (ref 27–33)
MCHC RBC AUTO-ENTMCNC: 33.3 G/DL (ref 33.7–35.3)
MCV RBC AUTO: 98.6 FL (ref 81.4–97.8)
MONOCYTES # BLD AUTO: 0.45 K/UL (ref 0–0.85)
MONOCYTES NFR BLD AUTO: 6.8 % (ref 0–13.4)
NEUTROPHILS # BLD AUTO: 3.91 K/UL (ref 1.82–7.42)
NEUTROPHILS NFR BLD: 59.2 % (ref 44–72)
NRBC # BLD AUTO: 0 K/UL
NRBC BLD-RTO: 0 /100 WBC
PLATELET # BLD AUTO: 234 K/UL (ref 164–446)
PMV BLD AUTO: 10 FL (ref 9–12.9)
RBC # BLD AUTO: 4.88 M/UL (ref 4.7–6.1)
WBC # BLD AUTO: 6.6 K/UL (ref 4.8–10.8)

## 2023-05-10 PROCEDURE — 36415 COLL VENOUS BLD VENIPUNCTURE: CPT

## 2023-05-10 PROCEDURE — 83036 HEMOGLOBIN GLYCOSYLATED A1C: CPT | Mod: GA

## 2023-05-10 PROCEDURE — 80053 COMPREHEN METABOLIC PANEL: CPT

## 2023-05-10 PROCEDURE — 85025 COMPLETE CBC W/AUTO DIFF WBC: CPT

## 2023-05-10 PROCEDURE — 80061 LIPID PANEL: CPT

## 2023-05-11 LAB
ALBUMIN SERPL BCP-MCNC: 4.4 G/DL (ref 3.2–4.9)
ALBUMIN/GLOB SERPL: 1.6 G/DL
ALP SERPL-CCNC: 90 U/L (ref 30–99)
ALT SERPL-CCNC: 21 U/L (ref 2–50)
ANION GAP SERPL CALC-SCNC: 14 MMOL/L (ref 7–16)
AST SERPL-CCNC: 15 U/L (ref 12–45)
BILIRUB SERPL-MCNC: 0.5 MG/DL (ref 0.1–1.5)
BUN SERPL-MCNC: 22 MG/DL (ref 8–22)
CALCIUM ALBUM COR SERPL-MCNC: 9.6 MG/DL (ref 8.5–10.5)
CALCIUM SERPL-MCNC: 9.9 MG/DL (ref 8.5–10.5)
CHLORIDE SERPL-SCNC: 107 MMOL/L (ref 96–112)
CHOLEST SERPL-MCNC: 137 MG/DL (ref 100–199)
CO2 SERPL-SCNC: 21 MMOL/L (ref 20–33)
CREAT SERPL-MCNC: 1.4 MG/DL (ref 0.5–1.4)
FASTING STATUS PATIENT QL REPORTED: NORMAL
GFR SERPLBLD CREATININE-BSD FMLA CKD-EPI: 52 ML/MIN/1.73 M 2
GLOBULIN SER CALC-MCNC: 2.8 G/DL (ref 1.9–3.5)
GLUCOSE SERPL-MCNC: 104 MG/DL (ref 65–99)
HDLC SERPL-MCNC: 74 MG/DL
LDLC SERPL CALC-MCNC: 57 MG/DL
POTASSIUM SERPL-SCNC: 5.2 MMOL/L (ref 3.6–5.5)
PROT SERPL-MCNC: 7.2 G/DL (ref 6–8.2)
SODIUM SERPL-SCNC: 142 MMOL/L (ref 135–145)
TRIGL SERPL-MCNC: 29 MG/DL (ref 0–149)

## 2023-11-07 ENCOUNTER — DOCUMENTATION (OUTPATIENT)
Dept: HEALTH INFORMATION MANAGEMENT | Facility: OTHER | Age: 78
End: 2023-11-07
Payer: MEDICARE

## 2023-11-13 DIAGNOSIS — M1A.9XX0 CHRONIC GOUT WITHOUT TOPHUS, UNSPECIFIED CAUSE, UNSPECIFIED SITE: ICD-10-CM

## 2023-11-13 NOTE — TELEPHONE ENCOUNTER
Patient called in to the office, to request the provider to send the following medications: Omeprazole and Allopurinol. I told the patient that he might needed an appt for allopurinol, since Dr. Montez did not prescribed this medication... Medication was last prescribed on 08/2022.. Please reach out to the pt, if pt needed an appt or provider just can send this to the pharmacy.. Thank you.

## 2023-11-14 DIAGNOSIS — I10 ESSENTIAL HYPERTENSION: ICD-10-CM

## 2023-11-14 NOTE — TELEPHONE ENCOUNTER
Received request via: Patient    Was the patient seen in the last year in this department? Yes    Does the patient have an active prescription (recently filled or refills available) for medication(s) requested? No    Does the patient have [facility] Plus and need 100 day supply (blood pressure, diabetes and cholesterol meds only)?

## 2023-11-15 RX ORDER — LISINOPRIL 10 MG/1
10 TABLET ORAL
Qty: 90 TABLET | Refills: 0 | Status: SHIPPED | OUTPATIENT
Start: 2023-11-15 | End: 2024-03-15 | Stop reason: SDUPTHER

## 2023-11-15 RX ORDER — ALLOPURINOL 300 MG/1
300 TABLET ORAL DAILY
Qty: 90 TABLET | Refills: 0 | Status: SHIPPED | OUTPATIENT
Start: 2023-11-15 | End: 2024-01-08

## 2023-11-15 RX ORDER — OMEPRAZOLE 20 MG/1
CAPSULE, DELAYED RELEASE ORAL
Qty: 90 CAPSULE | Refills: 0 | Status: SHIPPED | OUTPATIENT
Start: 2023-11-15 | End: 2024-02-05

## 2023-11-27 ENCOUNTER — HOSPITAL ENCOUNTER (OUTPATIENT)
Dept: LAB | Facility: MEDICAL CENTER | Age: 78
End: 2023-11-27
Attending: INTERNAL MEDICINE
Payer: MEDICARE

## 2023-11-27 LAB
ALBUMIN SERPL BCP-MCNC: 4.5 G/DL (ref 3.2–4.9)
ALBUMIN/GLOB SERPL: 1.8 G/DL
ALP SERPL-CCNC: 77 U/L (ref 30–99)
ALT SERPL-CCNC: 18 U/L (ref 2–50)
ANION GAP SERPL CALC-SCNC: 9 MMOL/L (ref 7–16)
AST SERPL-CCNC: 14 U/L (ref 12–45)
BASOPHILS # BLD AUTO: 1 % (ref 0–1.8)
BASOPHILS # BLD: 0.07 K/UL (ref 0–0.12)
BILIRUB SERPL-MCNC: 0.3 MG/DL (ref 0.1–1.5)
BUN SERPL-MCNC: 37 MG/DL (ref 8–22)
CALCIUM ALBUM COR SERPL-MCNC: 9.5 MG/DL (ref 8.5–10.5)
CALCIUM SERPL-MCNC: 9.9 MG/DL (ref 8.5–10.5)
CHLORIDE SERPL-SCNC: 106 MMOL/L (ref 96–112)
CO2 SERPL-SCNC: 23 MMOL/L (ref 20–33)
CREAT SERPL-MCNC: 1.54 MG/DL (ref 0.5–1.4)
EOSINOPHIL # BLD AUTO: 0.63 K/UL (ref 0–0.51)
EOSINOPHIL NFR BLD: 8.7 % (ref 0–6.9)
ERYTHROCYTE [DISTWIDTH] IN BLOOD BY AUTOMATED COUNT: 50.7 FL (ref 35.9–50)
GFR SERPLBLD CREATININE-BSD FMLA CKD-EPI: 46 ML/MIN/1.73 M 2
GLOBULIN SER CALC-MCNC: 2.5 G/DL (ref 1.9–3.5)
GLUCOSE SERPL-MCNC: 125 MG/DL (ref 65–99)
HCT VFR BLD AUTO: 47.8 % (ref 42–52)
HGB BLD-MCNC: 15.8 G/DL (ref 14–18)
IMM GRANULOCYTES # BLD AUTO: 0.02 K/UL (ref 0–0.11)
IMM GRANULOCYTES NFR BLD AUTO: 0.3 % (ref 0–0.9)
LYMPHOCYTES # BLD AUTO: 1.94 K/UL (ref 1–4.8)
LYMPHOCYTES NFR BLD: 26.7 % (ref 22–41)
MCH RBC QN AUTO: 33 PG (ref 27–33)
MCHC RBC AUTO-ENTMCNC: 33.1 G/DL (ref 32.3–36.5)
MCV RBC AUTO: 99.8 FL (ref 81.4–97.8)
MONOCYTES # BLD AUTO: 0.57 K/UL (ref 0–0.85)
MONOCYTES NFR BLD AUTO: 7.8 % (ref 0–13.4)
NEUTROPHILS # BLD AUTO: 4.04 K/UL (ref 1.82–7.42)
NEUTROPHILS NFR BLD: 55.5 % (ref 44–72)
NRBC # BLD AUTO: 0 K/UL
NRBC BLD-RTO: 0 /100 WBC (ref 0–0.2)
PLATELET # BLD AUTO: 231 K/UL (ref 164–446)
PMV BLD AUTO: 9.5 FL (ref 9–12.9)
POTASSIUM SERPL-SCNC: 5.1 MMOL/L (ref 3.6–5.5)
PROT SERPL-MCNC: 7 G/DL (ref 6–8.2)
RBC # BLD AUTO: 4.79 M/UL (ref 4.7–6.1)
SODIUM SERPL-SCNC: 138 MMOL/L (ref 135–145)
WBC # BLD AUTO: 7.3 K/UL (ref 4.8–10.8)

## 2023-11-27 PROCEDURE — 36415 COLL VENOUS BLD VENIPUNCTURE: CPT

## 2023-11-27 PROCEDURE — 80053 COMPREHEN METABOLIC PANEL: CPT

## 2023-11-27 PROCEDURE — 85025 COMPLETE CBC W/AUTO DIFF WBC: CPT

## 2023-11-29 ENCOUNTER — PATIENT MESSAGE (OUTPATIENT)
Dept: HEALTH INFORMATION MANAGEMENT | Facility: OTHER | Age: 78
End: 2023-11-29

## 2024-01-05 SDOH — ECONOMIC STABILITY: FOOD INSECURITY: WITHIN THE PAST 12 MONTHS, YOU WORRIED THAT YOUR FOOD WOULD RUN OUT BEFORE YOU GOT MONEY TO BUY MORE.: NEVER TRUE

## 2024-01-05 SDOH — HEALTH STABILITY: PHYSICAL HEALTH: ON AVERAGE, HOW MANY DAYS PER WEEK DO YOU ENGAGE IN MODERATE TO STRENUOUS EXERCISE (LIKE A BRISK WALK)?: 7 DAYS

## 2024-01-05 SDOH — ECONOMIC STABILITY: FOOD INSECURITY: WITHIN THE PAST 12 MONTHS, THE FOOD YOU BOUGHT JUST DIDN'T LAST AND YOU DIDN'T HAVE MONEY TO GET MORE.: NEVER TRUE

## 2024-01-05 SDOH — ECONOMIC STABILITY: INCOME INSECURITY: IN THE LAST 12 MONTHS, WAS THERE A TIME WHEN YOU WERE NOT ABLE TO PAY THE MORTGAGE OR RENT ON TIME?: NO

## 2024-01-05 SDOH — ECONOMIC STABILITY: HOUSING INSECURITY: IN THE LAST 12 MONTHS, HOW MANY PLACES HAVE YOU LIVED?: 1

## 2024-01-05 SDOH — HEALTH STABILITY: MENTAL HEALTH
STRESS IS WHEN SOMEONE FEELS TENSE, NERVOUS, ANXIOUS, OR CAN'T SLEEP AT NIGHT BECAUSE THEIR MIND IS TROUBLED. HOW STRESSED ARE YOU?: ONLY A LITTLE

## 2024-01-05 SDOH — HEALTH STABILITY: PHYSICAL HEALTH: ON AVERAGE, HOW MANY MINUTES DO YOU ENGAGE IN EXERCISE AT THIS LEVEL?: 60 MIN

## 2024-01-05 SDOH — ECONOMIC STABILITY: INCOME INSECURITY: HOW HARD IS IT FOR YOU TO PAY FOR THE VERY BASICS LIKE FOOD, HOUSING, MEDICAL CARE, AND HEATING?: NOT HARD AT ALL

## 2024-01-05 ASSESSMENT — SOCIAL DETERMINANTS OF HEALTH (SDOH)
IN A TYPICAL WEEK, HOW MANY TIMES DO YOU TALK ON THE PHONE WITH FAMILY, FRIENDS, OR NEIGHBORS?: MORE THAN THREE TIMES A WEEK
HOW OFTEN DO YOU ATTENT MEETINGS OF THE CLUB OR ORGANIZATION YOU BELONG TO?: PATIENT DECLINED
IN A TYPICAL WEEK, HOW MANY TIMES DO YOU TALK ON THE PHONE WITH FAMILY, FRIENDS, OR NEIGHBORS?: MORE THAN THREE TIMES A WEEK
HOW OFTEN DO YOU GET TOGETHER WITH FRIENDS OR RELATIVES?: ONCE A WEEK
HOW OFTEN DO YOU GET TOGETHER WITH FRIENDS OR RELATIVES?: ONCE A WEEK
WITHIN THE PAST 12 MONTHS, YOU WORRIED THAT YOUR FOOD WOULD RUN OUT BEFORE YOU GOT THE MONEY TO BUY MORE: NEVER TRUE
HOW OFTEN DO YOU ATTEND CHURCH OR RELIGIOUS SERVICES?: NEVER
HOW OFTEN DO YOU ATTEND CHURCH OR RELIGIOUS SERVICES?: NEVER
DO YOU BELONG TO ANY CLUBS OR ORGANIZATIONS SUCH AS CHURCH GROUPS UNIONS, FRATERNAL OR ATHLETIC GROUPS, OR SCHOOL GROUPS?: NO
HOW OFTEN DO YOU HAVE A DRINK CONTAINING ALCOHOL: 2-4 TIMES A MONTH
HOW OFTEN DO YOU ATTENT MEETINGS OF THE CLUB OR ORGANIZATION YOU BELONG TO?: PATIENT DECLINED
HOW OFTEN DO YOU HAVE SIX OR MORE DRINKS ON ONE OCCASION: NEVER
HOW MANY DRINKS CONTAINING ALCOHOL DO YOU HAVE ON A TYPICAL DAY WHEN YOU ARE DRINKING: 1 OR 2
DO YOU BELONG TO ANY CLUBS OR ORGANIZATIONS SUCH AS CHURCH GROUPS UNIONS, FRATERNAL OR ATHLETIC GROUPS, OR SCHOOL GROUPS?: NO
HOW HARD IS IT FOR YOU TO PAY FOR THE VERY BASICS LIKE FOOD, HOUSING, MEDICAL CARE, AND HEATING?: NOT HARD AT ALL

## 2024-01-05 ASSESSMENT — LIFESTYLE VARIABLES
SKIP TO QUESTIONS 9-10: 1
HOW MANY STANDARD DRINKS CONTAINING ALCOHOL DO YOU HAVE ON A TYPICAL DAY: 1 OR 2
AUDIT-C TOTAL SCORE: 2
HOW OFTEN DO YOU HAVE A DRINK CONTAINING ALCOHOL: 2-4 TIMES A MONTH
HOW OFTEN DO YOU HAVE SIX OR MORE DRINKS ON ONE OCCASION: NEVER

## 2024-01-08 ENCOUNTER — OFFICE VISIT (OUTPATIENT)
Dept: MEDICAL GROUP | Facility: MEDICAL CENTER | Age: 79
End: 2024-01-08
Payer: MEDICARE

## 2024-01-08 VITALS
OXYGEN SATURATION: 92 % | DIASTOLIC BLOOD PRESSURE: 80 MMHG | TEMPERATURE: 97.7 F | HEART RATE: 90 BPM | WEIGHT: 246.6 LBS | BODY MASS INDEX: 33.4 KG/M2 | HEIGHT: 72 IN | SYSTOLIC BLOOD PRESSURE: 122 MMHG

## 2024-01-08 DIAGNOSIS — E66.09 CLASS 1 OBESITY DUE TO EXCESS CALORIES WITHOUT SERIOUS COMORBIDITY WITH BODY MASS INDEX (BMI) OF 33.0 TO 33.9 IN ADULT: ICD-10-CM

## 2024-01-08 DIAGNOSIS — K22.70 BARRETT'S ESOPHAGUS WITHOUT DYSPLASIA: ICD-10-CM

## 2024-01-08 DIAGNOSIS — R73.03 PREDIABETES: ICD-10-CM

## 2024-01-08 DIAGNOSIS — M1A.0710 CHRONIC IDIOPATHIC GOUT INVOLVING TOE OF RIGHT FOOT WITHOUT TOPHUS: ICD-10-CM

## 2024-01-08 DIAGNOSIS — M1A.9XX0 CHRONIC GOUT WITHOUT TOPHUS, UNSPECIFIED CAUSE, UNSPECIFIED SITE: ICD-10-CM

## 2024-01-08 DIAGNOSIS — Z23 NEED FOR VACCINATION: ICD-10-CM

## 2024-01-08 DIAGNOSIS — N18.30 STAGE 3 CHRONIC KIDNEY DISEASE, UNSPECIFIED WHETHER STAGE 3A OR 3B CKD: ICD-10-CM

## 2024-01-08 DIAGNOSIS — I10 ESSENTIAL HYPERTENSION: Primary | ICD-10-CM

## 2024-01-08 LAB
APPEARANCE UR: CLEAR
BILIRUB UR STRIP-MCNC: NEGATIVE MG/DL
COLOR UR AUTO: YELLOW
GLUCOSE UR STRIP.AUTO-MCNC: NEGATIVE MG/DL
HBA1C MFR BLD: 6 % (ref ?–5.8)
KETONES UR STRIP.AUTO-MCNC: NEGATIVE MG/DL
LEUKOCYTE ESTERASE UR QL STRIP.AUTO: NEGATIVE
NITRITE UR QL STRIP.AUTO: NEGATIVE
PH UR STRIP.AUTO: 5.5 [PH] (ref 5–8)
POCT INT CON NEG: NEGATIVE
POCT INT CON POS: POSITIVE
PROT UR QL STRIP: NEGATIVE MG/DL
RBC UR QL AUTO: NEGATIVE
SP GR UR STRIP.AUTO: 1.01
UROBILINOGEN UR STRIP-MCNC: 0.2 MG/DL

## 2024-01-08 PROCEDURE — 99215 OFFICE O/P EST HI 40 MIN: CPT | Mod: 25 | Performed by: STUDENT IN AN ORGANIZED HEALTH CARE EDUCATION/TRAINING PROGRAM

## 2024-01-08 PROCEDURE — 3079F DIAST BP 80-89 MM HG: CPT | Performed by: STUDENT IN AN ORGANIZED HEALTH CARE EDUCATION/TRAINING PROGRAM

## 2024-01-08 PROCEDURE — 83036 HEMOGLOBIN GLYCOSYLATED A1C: CPT | Performed by: STUDENT IN AN ORGANIZED HEALTH CARE EDUCATION/TRAINING PROGRAM

## 2024-01-08 PROCEDURE — 81002 URINALYSIS NONAUTO W/O SCOPE: CPT | Performed by: STUDENT IN AN ORGANIZED HEALTH CARE EDUCATION/TRAINING PROGRAM

## 2024-01-08 PROCEDURE — 90662 IIV NO PRSV INCREASED AG IM: CPT | Performed by: STUDENT IN AN ORGANIZED HEALTH CARE EDUCATION/TRAINING PROGRAM

## 2024-01-08 PROCEDURE — 3074F SYST BP LT 130 MM HG: CPT | Performed by: STUDENT IN AN ORGANIZED HEALTH CARE EDUCATION/TRAINING PROGRAM

## 2024-01-08 PROCEDURE — G0008 ADMIN INFLUENZA VIRUS VAC: HCPCS | Performed by: STUDENT IN AN ORGANIZED HEALTH CARE EDUCATION/TRAINING PROGRAM

## 2024-01-08 RX ORDER — TRIAMCINOLONE ACETONIDE 1 MG/G
CREAM TOPICAL
COMMUNITY

## 2024-01-08 RX ORDER — TRAZODONE HYDROCHLORIDE 50 MG/1
TABLET ORAL
COMMUNITY
Start: 2023-08-21

## 2024-01-08 RX ORDER — ALLOPURINOL 300 MG/1
1 TABLET ORAL
COMMUNITY
End: 2024-01-08

## 2024-01-08 RX ORDER — LISINOPRIL 10 MG/1
1 TABLET ORAL
COMMUNITY
End: 2024-01-08

## 2024-01-08 RX ORDER — DIAZEPAM 10 MG/1
TABLET ORAL
COMMUNITY

## 2024-01-08 RX ORDER — BENZONATATE 100 MG/1
CAPSULE ORAL
COMMUNITY
End: 2024-01-08

## 2024-01-08 RX ORDER — OXYCODONE HYDROCHLORIDE 5 MG/1
TABLET ORAL
COMMUNITY

## 2024-01-08 RX ORDER — ROSUVASTATIN CALCIUM 10 MG/1
1 TABLET, COATED ORAL EVERY EVENING
COMMUNITY
End: 2024-01-08

## 2024-01-08 RX ORDER — ASPIRIN 81 MG/1
1 TABLET ORAL 2 TIMES DAILY WITH MEALS
COMMUNITY

## 2024-01-08 RX ORDER — SODIUM, POTASSIUM,MAG SULFATES 17.5-3.13G
SOLUTION, RECONSTITUTED, ORAL ORAL
COMMUNITY

## 2024-01-08 RX ORDER — TIZANIDINE 4 MG/1
1 TABLET ORAL
COMMUNITY

## 2024-01-08 RX ORDER — GABAPENTIN 300 MG/1
1 CAPSULE ORAL 2 TIMES DAILY
COMMUNITY

## 2024-01-08 RX ORDER — AMITRIPTYLINE HYDROCHLORIDE 25 MG/1
TABLET, FILM COATED ORAL
COMMUNITY

## 2024-01-08 RX ORDER — ALLOPURINOL 200 MG/1
200 TABLET ORAL DAILY
Qty: 90 TABLET | Refills: 0
Start: 2024-01-08 | End: 2024-03-26 | Stop reason: SDUPTHER

## 2024-01-08 RX ORDER — KETOCONAZOLE 20 MG/G
CREAM TOPICAL
COMMUNITY
Start: 2023-11-14

## 2024-01-08 ASSESSMENT — ENCOUNTER SYMPTOMS
MYALGIAS: 0
COUGH: 0
SHORTNESS OF BREATH: 0
HEADACHES: 0
SORE THROAT: 0
BLOOD IN STOOL: 0
NAUSEA: 0
BLURRED VISION: 0
PALPITATIONS: 0
DEPRESSION: 0
FEVER: 0
HEARTBURN: 0
DIZZINESS: 0
WHEEZING: 0
FALLS: 0

## 2024-01-08 ASSESSMENT — FIBROSIS 4 INDEX: FIB4 SCORE: 1.099943881845740593

## 2024-01-08 ASSESSMENT — PATIENT HEALTH QUESTIONNAIRE - PHQ9: CLINICAL INTERPRETATION OF PHQ2 SCORE: 0

## 2024-01-08 NOTE — PROGRESS NOTES
Subjective:     CC: establish care     HPI:   Valeriy presents today to establish care   Denies any acute concerns today .      Patient Active Problem List   Diagnosis    Essential hypertension    Prediabetes    Class 1 obesity due to excess calories without serious comorbidity with body mass index (BMI) of 32.0 to 32.9 in adult    MAVERICK (obstructive sleep apnea)    Abnormal EKG    Nonrheumatic aortic valve insufficiency    Elevated LDL cholesterol level    Chronic right-sided low back pain without sciatica    S/P lumbar laminectomy    Stage 3 chronic kidney disease (HCC)    King's esophagus without dysplasia    Osteoarthritis of right knee    Bilateral carpal tunnel syndrome    Chronic idiopathic gout involving toe of right foot without tophus     Current Outpatient Medications on File Prior to Visit   Medication Sig Dispense Refill    Continuous Blood Gluc  (FREESTYLE JASMIN 2 READER) Device FreeStyle Jasmin 2 Sensor kit   USE AS DIRECTED      amitriptyline (ELAVIL) 25 MG Tab       aspirin 81 MG EC tablet Take 1 Tablet by mouth 2 times a day with meals.      diclofenac sodium (VOLTAREN) 1 % Gel       gabapentin (NEURONTIN) 300 MG Cap Take 1 Capsule by mouth 2 times a day.      ketoconazole (NIZORAL) 2 % Cream APPLY TO AFFECTED AREAS ON FACE TWICE DAILY UNTIL CLEAR      Na Sulfate-K Sulfate-Mg Sulf 17.5-3.13-1.6 GM/177ML Solution USE PER THE FOLLOWING  FOLLOW INSTRUCTIONS THAT WERE MAILED.      oxyCODONE immediate-release (ROXICODONE) 5 MG Tab TAKE 1 TABLET BY MOUTH EVERY FOUR HOURS AS NEEDED FOR SEVERE PAIN FOR UP TO 7 DAYS.      tizanidine (ZANAFLEX) 4 MG Tab Take 1 Tablet by mouth at bedtime as needed.      traZODone (DESYREL) 50 MG Tab TAKE 1/2 TO 1 TAB BY MOUTH AT BEDTIME AS NEEDED FOR INSOMNIA      triamcinolone acetonide (KENALOG) 0.1 % Cream       omeprazole (PRILOSEC) 20 MG delayed-release capsule TAKE 1 CAP BY MOUTH AS NEEDED DAILY FOR HEARTBURN 90 Capsule 0    lisinopril (PRINIVIL) 10 MG Tab Take 1  "Tablet by mouth every day. 90 Tablet 0    Probiotic Product (PROBIOTIC-10) Cap Take 1 Cap by mouth every day.      multivitamin (THERAGRAN) Tab Take 1 Tab by mouth every day.      diazepam (VALIUM) 10 MG tablet TAKE 1 ORAL TABLET ONCE A DAY. TAKE 30 MINUTES BEFORE PROCEDURE      rosuvastatin (CRESTOR) 10 MG Tab TAKE 1 TABLET BY MOUTH EVERY DAY IN THE EVENING 90 Tablet 3     No current facility-administered medications on file prior to visit.         Health Maintenance: Completed    ROS:  Review of Systems   Constitutional:  Negative for fever and malaise/fatigue.   HENT:  Negative for congestion and sore throat.    Eyes:  Negative for blurred vision.   Respiratory:  Negative for cough, shortness of breath and wheezing.    Cardiovascular:  Negative for chest pain, palpitations and leg swelling.   Gastrointestinal:  Negative for blood in stool, heartburn and nausea.   Genitourinary:  Negative for dysuria and urgency.   Musculoskeletal:  Negative for falls and myalgias.   Neurological:  Negative for dizziness and headaches.   Psychiatric/Behavioral:  Negative for depression and suicidal ideas.        Review of systems unremarkable except for concerns noted by patient or items listed.    Please see HPI for additional ROS.      Objective:     Exam:  /80 (BP Location: Left arm, Patient Position: Sitting, BP Cuff Size: Large adult)   Pulse 90   Temp 36.5 °C (97.7 °F) (Temporal)   Ht 1.829 m (6' 0.01\")   Wt 112 kg (246 lb 9.6 oz)   SpO2 92%   BMI 33.44 kg/m²  Body mass index is 33.44 kg/m².    Physical Exam  Constitutional:       General: He is not in acute distress.     Appearance: Normal appearance.   HENT:      Head: Normocephalic.   Cardiovascular:      Rate and Rhythm: Normal rate and regular rhythm.      Pulses: Normal pulses.      Heart sounds: Normal heart sounds.   Pulmonary:      Effort: Pulmonary effort is normal.      Breath sounds: Normal breath sounds.   Skin:     General: Skin is warm. "   Neurological:      Mental Status: He is alert and oriented to person, place, and time.   Psychiatric:         Mood and Affect: Mood normal.         Behavior: Behavior normal.             Labs: reviewed     Assessment & Plan:     78 y.o. male with the following -     Chronic gout without tophus, unspecified cause, unspecified site  Chronic, stable  History of Gout   Last acute episode was 6 months back in right big toe region  Current medication: allopurinol 300 mg, but patient has not been taking it daily . He was off the medication for few weeks and did fine. Discussed with patient a. Counseling and education provided to be compliant with medication. Will decrease the dose to 200 mg daily , if 300 is too high dose.     - allopurinol 200 MG Tab; Take 200 mg by mouth every day.  Dispense: 90 Tablet; Refill: 0    Need for vaccination  - Influenza Vaccine, High Dose (65+ Only)     Prediabetes  Chronic, stable   Last A1C in 05/23 was 6.2   Eating healthy low carb diet   - POCT  A1C    Essential hypertension  Chronic, stable  Onset: remote   Current Meds: lisinopril 10 mg daily   Side effects: none   Home BP Log: stable , well controlled   No associated symptoms of chest pain, SOB, dizziness  Plan:  Continue lisinopril 10 mg daily   Continue low salt diet and exercise   - POCT Urinalysis    - POCT Urinalysis     chronic kidney disease unspecified whether stage 3a or 3b (HCC)  Chronic, slowly progressive  GFR 46 , previously 52-55  Cr increased to 1.5  Patient is avoiding NSAIDs  Does have chronic history of HTN and prediabetes but well controlled   Plan:  No proteinuria or blood in urine   Continue to follow up   Recommended to avoid nephrotoxins  Instructions provided for renal diet   Continue better control of HTN and prediabetes   - POCT Urinalysis    HCC Gap Form    Diagnosis to address: N18.30 - Stage 3 chronic kidney disease, unspecified whether stage 3a or 3b CKD (HCC)  Assessment and plan: Chronic, stable.  Continue with current defined treatment plan:   Recommended to avoid nephrotoxins  Continue better control of HTN and prediabetes   Follow-up at least annually.  Last edited 01/14/24 23:18 PST by Cinthia Magana M.D.         King's esophagus without dysplasia  Chronic problem.   Currently taking omeprazole 20 mg once a day as needed   His last endoscopy in 2021. Followed by [facility]       Plan:  Continue omeprazole 20 mg once a day   Continue f/u with GI     Obesity , BMI 33.4   Chronic, stable   Counseling and education provided for weight loss with low calorie diet and exercise     I spent a total of 40 minutes with record review, exam, communication with the patient, communication with other providers, and documentation of this encounter.        Return in about 3 months (around 4/8/2024) for medicare wellness.    Please note that this dictation was created using voice recognition software. I have made every reasonable attempt to correct obvious errors, but I expect that there are errors of grammar and possibly content that I did not discover before finalizing the note.

## 2024-02-05 RX ORDER — OMEPRAZOLE 20 MG/1
CAPSULE, DELAYED RELEASE ORAL
Qty: 90 CAPSULE | Refills: 0 | Status: SHIPPED | OUTPATIENT
Start: 2024-02-05

## 2024-02-05 NOTE — TELEPHONE ENCOUNTER
Received request via: Pharmacy    Was the patient seen in the last year in this department? Yes    Does the patient have an active prescription (recently filled or refills available) for medication(s) requested? No    [facility]    Does the patient have [facility] and need 100 day supply (blood pressure, diabetes and cholesterol meds only)? Patient does not have SCP    Future Appointments         Provider Department Center    4/8/2024 2:20 PM (Arrive by 2:05 PM) Cinthia Magana M.D. [facility]

## 2024-02-28 ENCOUNTER — HOSPITAL ENCOUNTER (OUTPATIENT)
Dept: LAB | Facility: MEDICAL CENTER | Age: 79
End: 2024-02-28
Attending: PHYSICIAN ASSISTANT
Payer: MEDICARE

## 2024-02-28 LAB — PSA SERPL-MCNC: 2.9 NG/ML (ref 0–4)

## 2024-02-28 PROCEDURE — 36415 COLL VENOUS BLD VENIPUNCTURE: CPT

## 2024-02-28 PROCEDURE — 84153 ASSAY OF PSA TOTAL: CPT

## 2024-03-04 DIAGNOSIS — E78.00 ELEVATED LDL CHOLESTEROL LEVEL: ICD-10-CM

## 2024-03-05 RX ORDER — ROSUVASTATIN CALCIUM 10 MG/1
TABLET, COATED ORAL
Qty: 90 TABLET | Refills: 3 | Status: SHIPPED | OUTPATIENT
Start: 2024-03-05

## 2024-03-15 DIAGNOSIS — I10 ESSENTIAL HYPERTENSION: ICD-10-CM

## 2024-03-15 RX ORDER — LISINOPRIL 10 MG/1
10 TABLET ORAL
Qty: 90 TABLET | Refills: 0 | Status: SHIPPED | OUTPATIENT
Start: 2024-03-15

## 2024-03-26 DIAGNOSIS — I10 ESSENTIAL HYPERTENSION: ICD-10-CM

## 2024-03-26 DIAGNOSIS — M1A.9XX0 CHRONIC GOUT WITHOUT TOPHUS, UNSPECIFIED CAUSE, UNSPECIFIED SITE: ICD-10-CM

## 2024-03-26 RX ORDER — LISINOPRIL 10 MG/1
10 TABLET ORAL
Qty: 90 TABLET | Refills: 0 | OUTPATIENT
Start: 2024-03-26

## 2024-03-26 RX ORDER — ALLOPURINOL 200 MG/1
200 TABLET ORAL DAILY
Qty: 90 TABLET | Refills: 0 | Status: SHIPPED | OUTPATIENT
Start: 2024-03-26

## 2024-04-08 ENCOUNTER — HOSPITAL ENCOUNTER (OUTPATIENT)
Dept: LAB | Facility: MEDICAL CENTER | Age: 79
End: 2024-04-08
Attending: INTERNAL MEDICINE
Payer: MEDICARE

## 2024-04-08 ENCOUNTER — OFFICE VISIT (OUTPATIENT)
Dept: MEDICAL GROUP | Facility: MEDICAL CENTER | Age: 79
End: 2024-04-08
Payer: MEDICARE

## 2024-04-08 VITALS
HEIGHT: 72 IN | DIASTOLIC BLOOD PRESSURE: 62 MMHG | HEART RATE: 65 BPM | SYSTOLIC BLOOD PRESSURE: 112 MMHG | TEMPERATURE: 97.2 F | BODY MASS INDEX: 32.8 KG/M2 | OXYGEN SATURATION: 92 % | WEIGHT: 242.2 LBS

## 2024-04-08 DIAGNOSIS — M54.50 CHRONIC RIGHT-SIDED LOW BACK PAIN WITHOUT SCIATICA: ICD-10-CM

## 2024-04-08 DIAGNOSIS — G89.29 CHRONIC RIGHT-SIDED LOW BACK PAIN WITHOUT SCIATICA: ICD-10-CM

## 2024-04-08 DIAGNOSIS — N18.31 STAGE 3A CHRONIC KIDNEY DISEASE: ICD-10-CM

## 2024-04-08 DIAGNOSIS — R73.03 PREDIABETES: ICD-10-CM

## 2024-04-08 LAB
ALBUMIN SERPL BCP-MCNC: 4.5 G/DL (ref 3.2–4.9)
ALBUMIN/GLOB SERPL: 1.7 G/DL
ALP SERPL-CCNC: 74 U/L (ref 30–99)
ALT SERPL-CCNC: 13 U/L (ref 2–50)
ANION GAP SERPL CALC-SCNC: 10 MMOL/L (ref 7–16)
AST SERPL-CCNC: 12 U/L (ref 12–45)
BASOPHILS # BLD AUTO: 1.3 % (ref 0–1.8)
BASOPHILS # BLD: 0.08 K/UL (ref 0–0.12)
BILIRUB SERPL-MCNC: 0.4 MG/DL (ref 0.1–1.5)
BUN SERPL-MCNC: 32 MG/DL (ref 8–22)
CALCIUM ALBUM COR SERPL-MCNC: 9.2 MG/DL (ref 8.5–10.5)
CALCIUM SERPL-MCNC: 9.6 MG/DL (ref 8.5–10.5)
CHLORIDE SERPL-SCNC: 105 MMOL/L (ref 96–112)
CO2 SERPL-SCNC: 24 MMOL/L (ref 20–33)
CREAT SERPL-MCNC: 1.34 MG/DL (ref 0.5–1.4)
EOSINOPHIL # BLD AUTO: 0.51 K/UL (ref 0–0.51)
EOSINOPHIL NFR BLD: 8 % (ref 0–6.9)
ERYTHROCYTE [DISTWIDTH] IN BLOOD BY AUTOMATED COUNT: 54.4 FL (ref 35.9–50)
GFR SERPLBLD CREATININE-BSD FMLA CKD-EPI: 54 ML/MIN/1.73 M 2
GLOBULIN SER CALC-MCNC: 2.7 G/DL (ref 1.9–3.5)
GLUCOSE SERPL-MCNC: 129 MG/DL (ref 65–99)
HCT VFR BLD AUTO: 47.8 % (ref 42–52)
HGB BLD-MCNC: 15.8 G/DL (ref 14–18)
IMM GRANULOCYTES # BLD AUTO: 0.02 K/UL (ref 0–0.11)
IMM GRANULOCYTES NFR BLD AUTO: 0.3 % (ref 0–0.9)
LYMPHOCYTES # BLD AUTO: 1.71 K/UL (ref 1–4.8)
LYMPHOCYTES NFR BLD: 27 % (ref 22–41)
MCH RBC QN AUTO: 33 PG (ref 27–33)
MCHC RBC AUTO-ENTMCNC: 33.1 G/DL (ref 32.3–36.5)
MCV RBC AUTO: 99.8 FL (ref 81.4–97.8)
MONOCYTES # BLD AUTO: 0.44 K/UL (ref 0–0.85)
MONOCYTES NFR BLD AUTO: 6.9 % (ref 0–13.4)
NEUTROPHILS # BLD AUTO: 3.58 K/UL (ref 1.82–7.42)
NEUTROPHILS NFR BLD: 56.5 % (ref 44–72)
NRBC # BLD AUTO: 0 K/UL
NRBC BLD-RTO: 0 /100 WBC (ref 0–0.2)
PLATELET # BLD AUTO: 217 K/UL (ref 164–446)
PMV BLD AUTO: 9.7 FL (ref 9–12.9)
POTASSIUM SERPL-SCNC: 4.8 MMOL/L (ref 3.6–5.5)
PROT SERPL-MCNC: 7.2 G/DL (ref 6–8.2)
RBC # BLD AUTO: 4.79 M/UL (ref 4.7–6.1)
SODIUM SERPL-SCNC: 139 MMOL/L (ref 135–145)
WBC # BLD AUTO: 6.3 K/UL (ref 4.8–10.8)

## 2024-04-08 PROCEDURE — 99214 OFFICE O/P EST MOD 30 MIN: CPT | Performed by: STUDENT IN AN ORGANIZED HEALTH CARE EDUCATION/TRAINING PROGRAM

## 2024-04-08 PROCEDURE — 85025 COMPLETE CBC W/AUTO DIFF WBC: CPT

## 2024-04-08 PROCEDURE — 3074F SYST BP LT 130 MM HG: CPT | Performed by: STUDENT IN AN ORGANIZED HEALTH CARE EDUCATION/TRAINING PROGRAM

## 2024-04-08 PROCEDURE — 3078F DIAST BP <80 MM HG: CPT | Performed by: STUDENT IN AN ORGANIZED HEALTH CARE EDUCATION/TRAINING PROGRAM

## 2024-04-08 PROCEDURE — 36415 COLL VENOUS BLD VENIPUNCTURE: CPT

## 2024-04-08 PROCEDURE — 80053 COMPREHEN METABOLIC PANEL: CPT

## 2024-04-08 ASSESSMENT — FIBROSIS 4 INDEX: FIB4 SCORE: 1.18

## 2024-04-08 NOTE — PROGRESS NOTES
Subjective:     CC:   Chief Complaint   Patient presents with    Follow-Up     3 month fv         HPI:   Valeriy presents today with    Labs reviewed and discussed today.  GFR around 54 stable compared to previous 1 which was 46.  Creatinine improved 1.34.  Slightly elevated BUN at 32.  Discussed with patient well hydration.  A1c checked in January this year was 6.0 well-controlled.  Normal liver function test normal electrolytes    Hemoglobin is good at 15.8 with normal hematocrit.  Slightly elevated MCV which is chronic and stable.  Eosinophils are slightly high 8% but absolute count is 0.51 which is normal. PSA normal 2.9      Problem   Stage 3 Chronic Kidney Disease    Chronic,  stable   GFR improved 54 , previously 52-55  Patient is avoiding NSAIDs  Does have chronic history of HTN and prediabetes but well controlled          Chronic Right-Sided Low Back Pain Without Sciatica    Patient with chronic right-sided low back pain more in the mornings.  Gets little better throughout the day.  Previously on muscle relaxers and pain meds.  Has underwent lumbar laminectomy.         Prediabetes    Chronic, stable   Last A1C in jan 2024 , 6.0   Eating healthy low carb diet        Current Outpatient Medications on File Prior to Visit   Medication Sig Dispense Refill    Allopurinol 200 MG Tab Take 200 mg by mouth every day. 90 Tablet 0    lisinopril (PRINIVIL) 10 MG Tab Take 1 Tablet by mouth every day. 90 Tablet 0    rosuvastatin (CRESTOR) 10 MG Tab TAKE 1 TABLET BY MOUTH EVERY DAY IN THE EVENING 90 Tablet 3    omeprazole (PRILOSEC) 20 MG delayed-release capsule TAKE 1 CAP BY MOUTH AS NEEDED DAILY FOR HEARTBURN 90 Capsule 0    aspirin 81 MG EC tablet Take 1 Tablet by mouth 2 times a day with meals.      gabapentin (NEURONTIN) 300 MG Cap Take 1 Capsule by mouth 2 times a day as needed (back pain). Indications: Neuropathic Pain      ketoconazole (NIZORAL) 2 % Cream APPLY TO AFFECTED AREAS ON FACE TWICE DAILY UNTIL CLEAR       "Probiotic Product (PROBIOTIC-10) Cap Take 1 Cap by mouth every day.      multivitamin (THERAGRAN) Tab Take 1 Tab by mouth every day.      Na Sulfate-K Sulfate-Mg Sulf 17.5-3.13-1.6 GM/177ML Solution USE PER THE FOLLOWING  FOLLOW INSTRUCTIONS THAT WERE MAILED. (Patient not taking: Reported on 4/8/2024)       No current facility-administered medications on file prior to visit.       Health Maintenance: Completed    ROS:  ROS    Review of systems unremarkable except for concerns noted by patient or items listed.    Please see HPI for additional ROS.      Objective:     Exam:  /62 (BP Location: Left arm, Patient Position: Sitting, BP Cuff Size: Adult)   Pulse 65   Temp 36.2 °C (97.2 °F) (Temporal)   Ht 1.829 m (6' 0.01\")   Wt 110 kg (242 lb 3.2 oz)   SpO2 92%   BMI 32.84 kg/m²  Body mass index is 32.84 kg/m².    Physical Exam  Constitutional:       General: He is not in acute distress.     Appearance: Normal appearance.   HENT:      Head: Normocephalic.   Cardiovascular:      Rate and Rhythm: Normal rate and regular rhythm.      Pulses: Normal pulses.      Heart sounds: Normal heart sounds.   Pulmonary:      Effort: Pulmonary effort is normal.      Breath sounds: Normal breath sounds.   Skin:     General: Skin is warm.   Neurological:      Mental Status: He is alert and oriented to person, place, and time.   Psychiatric:         Mood and Affect: Mood normal.         Behavior: Behavior normal.             Labs: reviewed     Assessment & Plan:     78 y.o. male with the following -     1. Stage 3a chronic kidney disease  Chronic, stable  GFR is 54   Plan  Avoid nephrotoxins like NSAIDs  Continue healthy renal diet  Continue good hydration  Better control of diabetes and blood pressure.    2. Prediabetes  Chronic, stable  A1c 6.0 well-controlled  Continue healthy low-carb diet and regular exercise  Continue lisinopril 10 mg daily for nephro protection and Crestor 10 mg daily    3. Chronic right-sided low back " pain without sciatica  This is chronic, stable  Plan  Recommended can consider taking gabapentin 300 mg twice a day as needed.  Can consider taking topical diclofenac 3 times daily as needed          Return in about 6 months (around 10/8/2024) for medicare wellness.    Please note that this dictation was created using voice recognition software. I have made every reasonable attempt to correct obvious errors, but I expect that there are errors of grammar and possibly content that I did not discover before finalizing the note.

## 2024-05-02 RX ORDER — OMEPRAZOLE 20 MG/1
CAPSULE, DELAYED RELEASE ORAL
Qty: 90 CAPSULE | Refills: 0 | Status: SHIPPED | OUTPATIENT
Start: 2024-05-02

## 2024-05-02 NOTE — TELEPHONE ENCOUNTER
Future Appointments         Provider Department [place]    9/26/2024 3:20 PM (Arrive by 3:05 PM) Cinthia Magana M.D. [facility]         Received request via: Pharmacy    Was the patient seen in the last year in this department? Yes    Does the patient have an active prescription (recently filled or refills available) for medication(s) requested? No    [facility]    Does the patient have group home Plus and need 100 day supply (blood pressure, diabetes and cholesterol meds only)? Patient does not have SCP

## 2024-05-30 ENCOUNTER — OFFICE VISIT (OUTPATIENT)
Dept: MEDICAL GROUP | Facility: MEDICAL CENTER | Age: 79
End: 2024-05-30
Payer: MEDICARE

## 2024-05-30 VITALS
WEIGHT: 242.8 LBS | HEART RATE: 70 BPM | OXYGEN SATURATION: 95 % | TEMPERATURE: 98.1 F | BODY MASS INDEX: 32.89 KG/M2 | SYSTOLIC BLOOD PRESSURE: 124 MMHG | HEIGHT: 72 IN | DIASTOLIC BLOOD PRESSURE: 64 MMHG

## 2024-05-30 DIAGNOSIS — I10 ESSENTIAL HYPERTENSION: ICD-10-CM

## 2024-05-30 DIAGNOSIS — M25.551 BILATERAL HIP PAIN: ICD-10-CM

## 2024-05-30 DIAGNOSIS — R19.7 DIARRHEA, UNSPECIFIED TYPE: ICD-10-CM

## 2024-05-30 DIAGNOSIS — M25.552 BILATERAL HIP PAIN: ICD-10-CM

## 2024-05-30 LAB
APPEARANCE UR: CLEAR
BILIRUB UR STRIP-MCNC: NEGATIVE MG/DL
COLOR UR AUTO: YELLOW
GLUCOSE UR STRIP.AUTO-MCNC: NEGATIVE MG/DL
KETONES UR STRIP.AUTO-MCNC: NEGATIVE MG/DL
LEUKOCYTE ESTERASE UR QL STRIP.AUTO: NEGATIVE
NITRITE UR QL STRIP.AUTO: NEGATIVE
PH UR STRIP.AUTO: 5.5 [PH] (ref 5–8)
PROT UR QL STRIP: NEGATIVE MG/DL
RBC UR QL AUTO: NEGATIVE
SP GR UR STRIP.AUTO: 1.01
UROBILINOGEN UR STRIP-MCNC: 0.2 MG/DL

## 2024-05-30 ASSESSMENT — FIBROSIS 4 INDEX: FIB4 SCORE: 1.18

## 2024-05-30 NOTE — PROGRESS NOTES
Chief Complaint   Patient presents with    Low Back Pain     Lower back pain for about a week.        HPI  Valeriy Orlando is a 78 y.o. male here today for diarrhea and lower back pain.    Patient with chronic constipation, takes MiraLAX daily in the morning with his decaf coffee, states for the past 3 days he has been having diarrhea with some pain over right lower quadrant, no nausea vomiting, no change in appetite, no blood in his stool.    Also patient has been experiencing pain over bilateral hips X 5 day, patient has chronic lower back pain, mainly midline.  Has Hx of 3 back surgeries ,   Patient is worried that the pain is coming from his kidneys, denies any dysuria fevers or chills    Patient continues on Lexapro 10 Mg daily for hypertension BP is well-controlled      Exam:  /64 (BP Location: Right arm, Patient Position: Sitting, BP Cuff Size: Adult)   Pulse 70   Temp 36.7 °C (98.1 °F) (Temporal)   Ht 1.829 m (6')   Wt 110 kg (242 lb 12.8 oz)   SpO2 95%       Constitutional: Alert, oriented in no acute distress.  Psych: Eye contact is good, speech goal directed, affect calm  Eyes: Conjunctiva non-injected, sclera non-icteric.  Lungs: Unlabored respiratory effort, clear to auscultation bilaterally with good excursion, no wheez or rhonci  CV: regular rate and rhythm. No lower extremity edema  Abdomen: No CVA tenderness, no tenderness to palpation over abdomen  MS: Pain appears to be over hip area bilaterally      A/P:    1. Diarrhea, unspecified type  New problem to discuss  Patient has been taking MiraLAX with coffee in the morning every day.  Has been having loose stool for 3 days.  Advised patient to stop MiraLAX and see if bowel movements will go back to normal    - POCT Urinalysis    2. Bilateral hip pain    New problem, patient has chronic low back pain however has been having pain over bilateral hips for the past 5 days.      3. Essential hypertension  Chronic, controlled, continue  lisinopril 10 mg qd     F/U: prn

## 2024-06-05 DIAGNOSIS — I10 ESSENTIAL HYPERTENSION: ICD-10-CM

## 2024-06-06 RX ORDER — LISINOPRIL 10 MG/1
10 TABLET ORAL
Qty: 90 TABLET | Refills: 0 | Status: SHIPPED | OUTPATIENT
Start: 2024-06-06

## 2024-06-24 DIAGNOSIS — M1A.9XX0 CHRONIC GOUT WITHOUT TOPHUS, UNSPECIFIED CAUSE, UNSPECIFIED SITE: ICD-10-CM

## 2024-06-25 RX ORDER — ALLOPURINOL 200 MG/1
1 TABLET ORAL
Qty: 90 TABLET | Refills: 0 | Status: SHIPPED | OUTPATIENT
Start: 2024-06-25

## 2024-07-01 PROBLEM — M19.012 OSTEOARTHRITIS OF LEFT GLENOHUMERAL JOINT: Status: ACTIVE | Noted: 2024-07-01

## 2024-07-08 RX ORDER — OMEPRAZOLE 20 MG/1
CAPSULE, DELAYED RELEASE ORAL
Qty: 90 CAPSULE | Refills: 0 | Status: SHIPPED | OUTPATIENT
Start: 2024-07-08

## 2024-08-31 DIAGNOSIS — I10 ESSENTIAL HYPERTENSION: ICD-10-CM

## 2024-09-03 RX ORDER — LISINOPRIL 10 MG/1
10 TABLET ORAL
Qty: 90 TABLET | Refills: 0 | Status: SHIPPED | OUTPATIENT
Start: 2024-09-03

## 2024-09-03 NOTE — TELEPHONE ENCOUNTER
Received request via: Pharmacy    Was the patient seen in the last year in this department? Yes    Does the patient have an active prescription (recently filled or refills available) for medication(s) requested? No    Does the patient have snf Plus and need 100-day supply? (This applies to ALL medications) Patient does not have SCP

## 2024-09-04 PROBLEM — G56.01 CARPAL TUNNEL SYNDROME OF RIGHT WRIST: Status: ACTIVE | Noted: 2024-09-04

## 2024-09-05 ENCOUNTER — OFFICE VISIT (OUTPATIENT)
Dept: MEDICAL GROUP | Facility: MEDICAL CENTER | Age: 79
End: 2024-09-05
Payer: MEDICARE

## 2024-09-05 VITALS
HEART RATE: 50 BPM | SYSTOLIC BLOOD PRESSURE: 120 MMHG | HEIGHT: 72 IN | OXYGEN SATURATION: 93 % | BODY MASS INDEX: 33.59 KG/M2 | WEIGHT: 248 LBS | DIASTOLIC BLOOD PRESSURE: 60 MMHG | TEMPERATURE: 96.9 F

## 2024-09-05 DIAGNOSIS — G89.29 CHRONIC RIGHT-SIDED LOW BACK PAIN WITH LEFT-SIDED SCIATICA: Primary | ICD-10-CM

## 2024-09-05 DIAGNOSIS — Z98.890 S/P LUMBAR LAMINECTOMY: ICD-10-CM

## 2024-09-05 DIAGNOSIS — K22.70 BARRETT'S ESOPHAGUS WITHOUT DYSPLASIA: ICD-10-CM

## 2024-09-05 DIAGNOSIS — M54.42 CHRONIC RIGHT-SIDED LOW BACK PAIN WITH LEFT-SIDED SCIATICA: Primary | ICD-10-CM

## 2024-09-05 PROCEDURE — 3074F SYST BP LT 130 MM HG: CPT | Performed by: STUDENT IN AN ORGANIZED HEALTH CARE EDUCATION/TRAINING PROGRAM

## 2024-09-05 PROCEDURE — 3078F DIAST BP <80 MM HG: CPT | Performed by: STUDENT IN AN ORGANIZED HEALTH CARE EDUCATION/TRAINING PROGRAM

## 2024-09-05 PROCEDURE — 99214 OFFICE O/P EST MOD 30 MIN: CPT | Performed by: STUDENT IN AN ORGANIZED HEALTH CARE EDUCATION/TRAINING PROGRAM

## 2024-09-05 RX ORDER — GABAPENTIN 300 MG/1
300 CAPSULE ORAL 2 TIMES DAILY PRN
Qty: 180 CAPSULE | Refills: 1 | Status: SHIPPED | OUTPATIENT
Start: 2024-09-05

## 2024-09-05 ASSESSMENT — FIBROSIS 4 INDEX: FIB4 SCORE: 1.18

## 2024-09-05 NOTE — PROGRESS NOTES
CC:   Chief Complaint   Patient presents with    Back Pain    Bowel Problem         HPI:   Valeriy presents today with    Verbal consent was acquired by the patient to use [employer] ambient listening note generation during this visit Yes   History of Present Illness  The patient is a 78-year-old male who presents for follow-up.    He reports chronic back pain   which he attributes to the removal of a non-functioning spinal cord stimulator. He describes a tingling sensation radiating down his leg, which he believes is due to sciatica. He recalls undergoing two laminectomies in the past, involving fusion at the L3-L4 or L4-L5 levels, but these procedures did not alleviate his back pain. He has not yet had an x-ray of his back. He has an upcoming appointment at [facility] for his back issues. Initially hesitant to take gabapentin due to potential side effects, he decided to try it after discussing it with me and found it beneficial. He also uses CBD with 20 percent THC, which he can only take at night as it does not induce sleepiness. He has been unable to refill this medication. He has lost about 10 pounds since his back pain began.    He mentions irregular bowel movements, despite maintaining a diet rich in salads. He does not experience constipation but notes that he spends about an hour in the bathroom each morning. He experiences cramp-like pain prior to bowel movements, which resolves post-defecation. He maintains a high-fiber diet and exercises daily. He takes MiraLAX for his bowel issues. He has a history of stomach problems and was previously warned about the risk of appendicitis. He is scheduled for a liver scan with his hepatologist.    He also experiences wrist stiffness at night, which disrupts his sleep.planning for carpel tunnel surgery with [facility]    Supplemental Information:  He had a knee replacement and is scheduled to have his wrist replaced in 2 weeks, followed by a shoulder replacement. He has been skiing  a lot and finds it very painful to walk downhill, although he can walk uphill without significant pain. He takes medication for his prediabetes and GERD    Results  Laboratory Studies  Kidney function is stable and showing better numbers compared to the last time.      Health Maintenance: Completed    ROS:  ROS    Review of systems unremarkable except for concerns noted by patient or items listed.    Please see HPI for additional ROS.      Objective:     Exam:  /60 (BP Location: Left arm, Patient Position: Sitting, BP Cuff Size: Adult)   Pulse (!) 50   Temp 36.1 °C (96.9 °F) (Temporal)   Ht 1.829 m (6')   Wt 112 kg (248 lb)   SpO2 93%   BMI 33.63 kg/m²  Body mass index is 33.63 kg/m².    Physical Exam  Constitutional:       General: He is not in acute distress.     Appearance: Normal appearance.   HENT:      Head: Normocephalic.   Cardiovascular:      Rate and Rhythm: Normal rate and regular rhythm.      Pulses: Normal pulses.      Heart sounds: Normal heart sounds.   Pulmonary:      Effort: Pulmonary effort is normal.      Breath sounds: Normal breath sounds.   Skin:     General: Skin is warm.   Neurological:      Mental Status: He is alert and oriented to person, place, and time.   Psychiatric:         Mood and Affect: Mood normal.         Behavior: Behavior normal.             Labs: reviewed     Assessment & Plan:     78 y.o. male with the following -     1. Chronic right-sided low back pain with left-sided sciatica  2. S/P lumbar laminectomy  Chronic ,stable  Currently has increase in back pn  Patient with chronic right-sided low back pain more in the mornings.  Gets little better throughout the day.  Previously on muscle relaxers and pain meds.  Has underwent lumbar laminectomy.    Reports good results with gabapentin in pain control. Tolerated very well.  Requesting refills   Plan;  Continue f/u with [facility] /orthopedics  Refill of medications  - gabapentin (NEURONTIN) 300 MG Cap; Take 1 Capsule by  mouth 2 times a day as needed (back pain). Indications: Neuropathic Pain  Dispense: 180 Capsule; Refill: 1        3. King's esophagus without dysplasia  Chronic condition, stable  GERD Symptoms well controlled with omeprazole 20 mg am   Denies:   - heartburn, epigastric pain.   - nausea, vomiting, or diarrhea  - dysphagia  - abnormal cough  - blood in the stool or dark tarry stools.  - early satiety  - tobacco use.  Plan:  Appropriate counseling given.  Discussed - weight loss, elevated the head of the bed, avoid common food triggers (fatty or fried foods, tomato sauce, alcohol, chocolate, mint, garlic, onion, and caffeine)  Continue current medication      Return in about 3 months (around 12/5/2024) for medicare wellness.    Please note that this dictation was created using voice recognition software. I have made every reasonable attempt to correct obvious errors, but I expect that there are errors of grammar and possibly content that I did not discover before finalizing the note.

## 2024-09-09 ENCOUNTER — HOSPITAL ENCOUNTER (OUTPATIENT)
Dept: LAB | Facility: MEDICAL CENTER | Age: 79
End: 2024-09-09
Attending: INTERNAL MEDICINE
Payer: MEDICARE

## 2024-09-09 LAB
ALBUMIN SERPL BCP-MCNC: 4.3 G/DL (ref 3.2–4.9)
ALBUMIN/GLOB SERPL: 1.7 G/DL
ALP SERPL-CCNC: 66 U/L (ref 30–99)
ALT SERPL-CCNC: 17 U/L (ref 2–50)
ANION GAP SERPL CALC-SCNC: 12 MMOL/L (ref 7–16)
AST SERPL-CCNC: 15 U/L (ref 12–45)
BASOPHILS # BLD AUTO: 0.9 % (ref 0–1.8)
BASOPHILS # BLD: 0.05 K/UL (ref 0–0.12)
BILIRUB SERPL-MCNC: 0.6 MG/DL (ref 0.1–1.5)
BUN SERPL-MCNC: 23 MG/DL (ref 8–22)
CALCIUM ALBUM COR SERPL-MCNC: 9.4 MG/DL (ref 8.5–10.5)
CALCIUM SERPL-MCNC: 9.6 MG/DL (ref 8.5–10.5)
CHLORIDE SERPL-SCNC: 105 MMOL/L (ref 96–112)
CO2 SERPL-SCNC: 21 MMOL/L (ref 20–33)
CREAT SERPL-MCNC: 1.36 MG/DL (ref 0.5–1.4)
EOSINOPHIL # BLD AUTO: 0.48 K/UL (ref 0–0.51)
EOSINOPHIL NFR BLD: 8.2 % (ref 0–6.9)
ERYTHROCYTE [DISTWIDTH] IN BLOOD BY AUTOMATED COUNT: 51.2 FL (ref 35.9–50)
GFR SERPLBLD CREATININE-BSD FMLA CKD-EPI: 53 ML/MIN/1.73 M 2
GLOBULIN SER CALC-MCNC: 2.6 G/DL (ref 1.9–3.5)
GLUCOSE SERPL-MCNC: 121 MG/DL (ref 65–99)
HCT VFR BLD AUTO: 44.4 % (ref 42–52)
HGB BLD-MCNC: 14.9 G/DL (ref 14–18)
IMM GRANULOCYTES # BLD AUTO: 0.02 K/UL (ref 0–0.11)
IMM GRANULOCYTES NFR BLD AUTO: 0.3 % (ref 0–0.9)
LYMPHOCYTES # BLD AUTO: 1.42 K/UL (ref 1–4.8)
LYMPHOCYTES NFR BLD: 24.1 % (ref 22–41)
MCH RBC QN AUTO: 33 PG (ref 27–33)
MCHC RBC AUTO-ENTMCNC: 33.6 G/DL (ref 32.3–36.5)
MCV RBC AUTO: 98.2 FL (ref 81.4–97.8)
MONOCYTES # BLD AUTO: 0.46 K/UL (ref 0–0.85)
MONOCYTES NFR BLD AUTO: 7.8 % (ref 0–13.4)
NEUTROPHILS # BLD AUTO: 3.45 K/UL (ref 1.82–7.42)
NEUTROPHILS NFR BLD: 58.7 % (ref 44–72)
NRBC # BLD AUTO: 0 K/UL
NRBC BLD-RTO: 0 /100 WBC (ref 0–0.2)
PLATELET # BLD AUTO: 247 K/UL (ref 164–446)
PMV BLD AUTO: 9.4 FL (ref 9–12.9)
POTASSIUM SERPL-SCNC: 4.5 MMOL/L (ref 3.6–5.5)
PROT SERPL-MCNC: 6.9 G/DL (ref 6–8.2)
RBC # BLD AUTO: 4.52 M/UL (ref 4.7–6.1)
SODIUM SERPL-SCNC: 138 MMOL/L (ref 135–145)
WBC # BLD AUTO: 5.9 K/UL (ref 4.8–10.8)

## 2024-09-09 PROCEDURE — 36415 COLL VENOUS BLD VENIPUNCTURE: CPT

## 2024-09-09 PROCEDURE — 80053 COMPREHEN METABOLIC PANEL: CPT

## 2024-09-09 PROCEDURE — 85025 COMPLETE CBC W/AUTO DIFF WBC: CPT

## 2024-09-17 DIAGNOSIS — M1A.9XX0 CHRONIC GOUT WITHOUT TOPHUS, UNSPECIFIED CAUSE, UNSPECIFIED SITE: ICD-10-CM

## 2024-09-19 RX ORDER — ALLOPURINOL 200 MG/1
1 TABLET ORAL
Qty: 90 TABLET | Refills: 1 | Status: SHIPPED | OUTPATIENT
Start: 2024-09-19

## 2024-09-19 NOTE — TELEPHONE ENCOUNTER
Received request via: Patient    Was the patient seen in the last year in this department? Yes    Does the patient have an active prescription (recently filled or refills available) for medication(s) requested? No    [facility] [address]    Does the patient have [facility] Plus and need 100-day supply? (This applies to ALL medications) Patient does not have SCP

## 2024-11-26 DIAGNOSIS — I10 ESSENTIAL HYPERTENSION: ICD-10-CM

## 2024-11-27 RX ORDER — LISINOPRIL 10 MG/1
10 TABLET ORAL
Qty: 90 TABLET | Refills: 0 | Status: SHIPPED | OUTPATIENT
Start: 2024-11-27

## 2024-11-27 NOTE — TELEPHONE ENCOUNTER
Received request via: Pharmacy    Was the patient seen in the last year in this department? Yes    Does the patient have an active prescription (recently filled or refills available) for medication(s) requested? No    Does the patient have skilled nursing Plus and need 100-day supply? (This applies to ALL medications) Patient does not have SCP

## 2024-12-09 ENCOUNTER — APPOINTMENT (OUTPATIENT)
Dept: MEDICAL GROUP | Facility: MEDICAL CENTER | Age: 79
End: 2024-12-09
Payer: MEDICARE

## 2024-12-09 VITALS
RESPIRATION RATE: 20 BRPM | OXYGEN SATURATION: 93 % | DIASTOLIC BLOOD PRESSURE: 60 MMHG | HEART RATE: 67 BPM | HEIGHT: 72 IN | SYSTOLIC BLOOD PRESSURE: 120 MMHG | WEIGHT: 262.6 LBS | BODY MASS INDEX: 35.57 KG/M2 | TEMPERATURE: 98.9 F

## 2024-12-09 DIAGNOSIS — G47.33 OSA (OBSTRUCTIVE SLEEP APNEA): ICD-10-CM

## 2024-12-09 DIAGNOSIS — K22.70 BARRETT'S ESOPHAGUS WITHOUT DYSPLASIA: ICD-10-CM

## 2024-12-09 DIAGNOSIS — M1A.0710 CHRONIC IDIOPATHIC GOUT INVOLVING TOE OF RIGHT FOOT WITHOUT TOPHUS: ICD-10-CM

## 2024-12-09 DIAGNOSIS — M54.42 CHRONIC RIGHT-SIDED LOW BACK PAIN WITH LEFT-SIDED SCIATICA: ICD-10-CM

## 2024-12-09 DIAGNOSIS — M19.012 OSTEOARTHRITIS OF LEFT GLENOHUMERAL JOINT: ICD-10-CM

## 2024-12-09 DIAGNOSIS — I10 ESSENTIAL HYPERTENSION: ICD-10-CM

## 2024-12-09 DIAGNOSIS — N18.31 STAGE 3A CHRONIC KIDNEY DISEASE: ICD-10-CM

## 2024-12-09 DIAGNOSIS — G56.03 BILATERAL CARPAL TUNNEL SYNDROME: ICD-10-CM

## 2024-12-09 DIAGNOSIS — G89.29 CHRONIC RIGHT-SIDED LOW BACK PAIN WITH LEFT-SIDED SCIATICA: ICD-10-CM

## 2024-12-09 DIAGNOSIS — Z12.5 PROSTATE CANCER SCREENING: ICD-10-CM

## 2024-12-09 DIAGNOSIS — Z00.00 HEALTHCARE MAINTENANCE: ICD-10-CM

## 2024-12-09 DIAGNOSIS — Z00.00 ENCOUNTER FOR MEDICARE ANNUAL WELLNESS EXAM: Primary | ICD-10-CM

## 2024-12-09 DIAGNOSIS — Z23 NEED FOR VACCINATION: ICD-10-CM

## 2024-12-09 DIAGNOSIS — E66.09 CLASS 2 OBESITY DUE TO EXCESS CALORIES WITHOUT SERIOUS COMORBIDITY WITH BODY MASS INDEX (BMI) OF 35.0 TO 35.9 IN ADULT: ICD-10-CM

## 2024-12-09 DIAGNOSIS — R73.03 PREDIABETES: ICD-10-CM

## 2024-12-09 DIAGNOSIS — I35.1 NONRHEUMATIC AORTIC VALVE INSUFFICIENCY: ICD-10-CM

## 2024-12-09 DIAGNOSIS — E66.812 CLASS 2 OBESITY DUE TO EXCESS CALORIES WITHOUT SERIOUS COMORBIDITY WITH BODY MASS INDEX (BMI) OF 35.0 TO 35.9 IN ADULT: ICD-10-CM

## 2024-12-09 PROBLEM — G56.01 CARPAL TUNNEL SYNDROME OF RIGHT WRIST: Status: RESOLVED | Noted: 2024-09-04 | Resolved: 2024-12-09

## 2024-12-09 PROCEDURE — 90662 IIV NO PRSV INCREASED AG IM: CPT | Performed by: STUDENT IN AN ORGANIZED HEALTH CARE EDUCATION/TRAINING PROGRAM

## 2024-12-09 PROCEDURE — G0439 PPPS, SUBSEQ VISIT: HCPCS | Mod: 25 | Performed by: STUDENT IN AN ORGANIZED HEALTH CARE EDUCATION/TRAINING PROGRAM

## 2024-12-09 PROCEDURE — G0008 ADMIN INFLUENZA VIRUS VAC: HCPCS | Performed by: STUDENT IN AN ORGANIZED HEALTH CARE EDUCATION/TRAINING PROGRAM

## 2024-12-09 PROCEDURE — 3074F SYST BP LT 130 MM HG: CPT | Performed by: STUDENT IN AN ORGANIZED HEALTH CARE EDUCATION/TRAINING PROGRAM

## 2024-12-09 PROCEDURE — 3078F DIAST BP <80 MM HG: CPT | Performed by: STUDENT IN AN ORGANIZED HEALTH CARE EDUCATION/TRAINING PROGRAM

## 2024-12-09 ASSESSMENT — PATIENT HEALTH QUESTIONNAIRE - PHQ9: CLINICAL INTERPRETATION OF PHQ2 SCORE: 0

## 2024-12-09 ASSESSMENT — FIBROSIS 4 INDEX: FIB4 SCORE: 1.13

## 2024-12-09 ASSESSMENT — ENCOUNTER SYMPTOMS: GENERAL WELL-BEING: GOOD

## 2024-12-09 ASSESSMENT — ACTIVITIES OF DAILY LIVING (ADL): BATHING_REQUIRES_ASSISTANCE: 0

## 2024-12-09 NOTE — PROGRESS NOTES
Chief Complaint   Patient presents with    Annual Exam       HPI:  Valeriy Orlando is a 78 y.o. here for Medicare Annual Wellness Visit       New skin lesion - skin cancer near the right ear - told that he needs Moh's procedure but he is planning to consider radiation treatment instead. 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 tophus 01/08/2024    Bilateral carpal tunnel syndrome 02/15/2023    Osteoarthritis of right knee 06/02/2022    King's esophagus without dysplasia 07/15/2019    Stage 3 chronic kidney disease 02/21/2019    S/P lumbar laminectomy 09/12/2018    Chronic right-sided low back pain with left-sided sciatica 07/06/2018    Elevated LDL cholesterol level 05/09/2018    Nonrheumatic aortic valve insufficiency 09/13/2017    Abnormal EKG 06/06/2017    Essential hypertension 05/03/2017    Prediabetes 05/03/2017    Class 1 obesity due to excess calories without serious comorbidity with body mass index (BMI) of 32.0 to 32.9 in adult 05/03/2017    MAVERICK (obstructive sleep apnea) 05/03/2017       Current Outpatient Medications   Medication Sig Dispense Refill    lisinopril (PRINIVIL) 10 MG Tab TAKE 1 TABLET BY MOUTH EVERY DAY 90 Tablet 0    omeprazole (PRILOSEC) 20 MG delayed-release capsule TAKE 1 CAPSULE BY MOUTH EVERY DAY AS NEEDED FOR HEARTBURN 90 Capsule 0    Allopurinol 200 MG Tab TAKE 1 TABLET BY MOUTH EVERY DAY 90 Tablet 1    gabapentin (NEURONTIN) 300 MG Cap Take 1 Capsule by mouth 2 times a day as needed (back pain). Indications: Neuropathic Pain 180 Capsule 1    rosuvastatin (CRESTOR) 10 MG Tab TAKE 1 TABLET BY MOUTH EVERY DAY IN THE EVENING 90 Tablet 3    multivitamin (THERAGRAN) Tab Take 1 Tab by mouth every day.       No current facility-administered medications for this visit.          Current  supplements as per medication list.     Allergies: Ibuprofen    Current social contact/activities: Pt gathers with family and friends.     He  reports that he has never smoked. He has never used smokeless tobacco. He reports current alcohol use of about 0.6 oz of alcohol per week. He reports that he does not currently use drugs.  Counseling given: Not Answered      ROS:    Gait: Uses no assistive device  Ostomy: No  Other tubes: No  Amputations: No  Chronic oxygen use: No, only at night with a C-PAP machine  Last eye exam: 6 mo ago  Wears hearing aids: No   : Denies any urinary leakage during the last 6 months    Depression Screening  Little interest or pleasure in doing things?  0 - not at all  Feeling down, depressed , or hopeless? 0 - not at all  Patient Health Questionnaire Score: 0     If depressive symptoms identified deferred to follow up visit unless specifically addressed in assessment and plan.    Interpretation of PHQ-9 Total Score   Score Severity   1-4 No Depression   5-9 Mild Depression   10-14 Moderate Depression   15-19 Moderately Severe Depression   20-27 Severe Depression    Screening for Cognitive Impairment  Do you or any of your friends or family members have any concern about your memory? No  Three Minute Recall (Leader, Season, Table) 3/3    Clark clock face with all 12 numbers and set the hands to show 10 minutes after 11.  Yes 5/5  Cognitive concerns identified deferred for follow up unless specifically addressed in assessment and plan.    Fall Risk Assessment  Has the patient had two or more falls in the last year or any fall with injury in the last year?  No    Safety Assessment  Do you always wear your seatbelt?  Yes  Any changes to home needed to function safely? No  Difficulty hearing.  No  Patient counseled about all safety risks that were identified.    Functional Assessment ADLs  Are there any barriers preventing you from cooking for yourself or meeting nutritional needs?  No.     Are there any barriers preventing you from driving safely or obtaining transportation?  No.    Are there any barriers preventing you from using a telephone or calling for help?  No    Are there any barriers preventing you from shopping?  No.    Are there any barriers preventing you from taking care of your own finances?  No    Are there any barriers preventing you from managing your medications?  No    Are there any barriers preventing you from showering, bathing or dressing yourself? No    Are there any barriers preventing you from doing housework or laundry? No  Are there any barriers preventing you from using the toilet?No  Are you currently engaging in any exercise or physical activity?  Yes. Pt works out every day for 1-2 hours.     Self-Assessment of Health  What is your perception of your health? Good    Do you sleep more than six hours a night? Yes    In the past 7 days, how much did pain keep you from doing your normal work? Some    Do you spend quality time with family or friends (virtually or in person)? Yes    Do you usually eat a heart healthy diet that constists of a variety of fruits, vegetables, whole grains and fiber? Yes    Do you eat foods high in fat and/or Fast Food more than three times per week? No    How concerned are you that your medical conditions are not being well managed? Not at all    Are you worried that in the next 2 months, you may not have stable housing that you own, rent, or stay in as part of a household? No      Advance Care Planning  Do you have an Advance Directive, Living Will, Durable Power of , or POLST? No                 Health Maintenance Summary            Overdue - COVID-19 Vaccine (3 - 2024-25 season) Overdue since 9/1/2024 03/06/2021  Imm Admin: PFIZER PURPLE CAP SARS-COV-2 VACCINATION (12+)    02/16/2021  Imm Admin: PFIZER PURPLE CAP SARS-COV-2 VACCINATION (12+)              Annual Wellness Visit (Yearly) Next due on 12/9/2025 12/09/2024  Visit  Dx: Encounter for Medicare annual wellness exam    2023  Level of Service: MA ANNUAL WELLNESS VISIT-INCLUDES PPPS SUBSEQUE*    2018  Visit Dx: Medicare annual wellness visit, subsequent              IMM DTaP/Tdap/Td Vaccine (6 - Td or Tdap) Next due on 2032  Imm Admin: Tdap Vaccine    05/10/2017  Imm Admin: Tdap Vaccine    2011  Imm Admin: Tdap Vaccine    2000  Imm Admin: Tdap Vaccine    2000  Imm Admin: TD Vaccine    Only the first 5 history entries have been loaded, but more history exists.              Pneumococcal Vaccine: 65+ Years (Series Information) Completed      07/15/2019  Imm Admin: Pneumococcal polysaccharide vaccine (PPSV-23)    05/10/2017  Imm Admin: Pneumococcal Conjugate Vaccine (Prevnar/PCV-13)    2008  Imm Admin: Pneumococcal polysaccharide vaccine (PPSV-23)              Zoster (Shingles) Vaccines (Series Information) Completed      2021  Imm Admin: Zoster Vaccine Recombinant (RZV) (SHINGRIX)    2021  Imm Admin: Zoster Vaccine Recombinant (RZV) (SHINGRIX)              Hepatitis C Screening  Tentatively Complete      2023  Hepatitis C Antibody component of HCV Scrn ( 5254-3863 1xLife)              Influenza Vaccine (Series Information) Completed      2024  Imm Admin: Influenza high-dose trivalent (PF)    2024  Imm Admin: Influenza Vaccine Adult HD    10/19/2020  Imm Admin: Influenza Vaccine Adult HD    2019  Imm Admin: Influenza Vaccine Adult HD    2018  Imm Admin: Influenza Vaccine Adult HD    Only the first 5 history entries have been loaded, but more history exists.              Hepatitis A Vaccine (Hep A) (Series Information) Aged Out      No completion history exists for this topic.              Hepatitis B Vaccine (Hep B) (Series Information) Aged Out      No completion history exists for this topic.              HPV Vaccines (Series Information) Aged Out      No completion history exists  for this topic.              Polio Vaccine (Inactivated Polio) (Series Information) Aged Out      No completion history exists for this topic.              Meningococcal Immunization (Series Information) Aged Out      No completion history exists for this topic.              Discontinued - Colorectal Cancer Screening  Discontinued        Frequency changed to Never automatically (Topic No Longer Applies)    01/11/2021  REFERRAL TO GI FOR COLONOSCOPY    07/19/2018  REFERRAL TO GI FOR COLONOSCOPY    01/02/2014  REFERRAL TO GI FOR COLONOSCOPY                    Patient Care Team:  Cinthia Magana M.D. as PCP - General (Internal Medicine)        Social History     Tobacco Use    Smoking status: Never    Smokeless tobacco: Never   Vaping Use    Vaping status: Never Used   Substance Use Topics    Alcohol use: Yes     Alcohol/week: 0.6 oz     Types: 1 Glasses of wine per week    Drug use: Not Currently     Comment: Uses CBD oil - last use 9/26/19     Family History   Problem Relation Age of Onset    Heart Disease Mother     Cancer Mother         lung cancer    Heart Disease Maternal Grandmother     Diabetes Paternal Grandfather     Kidney Disease Neg Hx     Ovarian Cancer Neg Hx     Tubal Cancer Neg Hx     Peritoneal Cancer Neg Hx     Colorectal Cancer Neg Hx     Breast Cancer Neg Hx     Hypertension Neg Hx     Hyperlipidemia Neg Hx     Stroke Neg Hx      He  has a past medical history of Depression, Diabetes (HCC), Heart burn, Hepatitis A (2001), High cholesterol, Hypertension, Indigestion, Murmur, Prediabetes, Renal disorder, S/P insertion of spinal cord stimulator, Sleep apnea, and Snoring.   Past Surgical History:   Procedure Laterality Date    WI NEUROPLASTY & OR TRANSPOS MEDIAN NRV CARPAL GRISEL Right 10/15/2024    Procedure: RIGHT HAND OPEN CARPAL TUNNEL RELEASE;  Surgeon: Ethan Nolan M.D.;  Location: [facility];  Service: Orthopedics    PB TOTAL KNEE ARTHROPLASTY Right 6/27/2022    Procedure: RIGHT  TOTAL KNEE ARTHROPLASTY;  Surgeon: Charly Warner M.D.;  Location: [facility];  Service: Orthopedics    LUMBAR LAMINECTOMY DISKECTOMY Bilateral 10/3/2019    Procedure: LAMINECTOMY, SPINE, LUMBAR, WITH DISCECTOMY;  Surgeon: Kyler Juares M.D.;  Location: [facility];  Service: Neurosurgery    FUSION, SPINE, LUMBAR, PLIF Bilateral 10/3/2019    Procedure: FUSION, SPINE, LUMBAR, PLIF- REDO L4-5 WITH TLIF;  Surgeon: Kyler Juares M.D.;  Location: [facility];  Service: Neurosurgery    LUMBAR LAMINECTOMY DISKECTOMY  9/4/2018    Procedure: LUMBAR LAMINECTOMY DISKECTOMY-  L2-3 OPEN LAMI AND REDO L3-4 LAMI;  Surgeon: Bentley Claudio M.D.;  Location: [facility];  Service: Neurosurgery    LUMBAR LAMINECTOMY DISKECTOMY  11/29/2016    Procedure: LUMBAR LAMINECTOMY DISKECTOMY L3-5 open laminectomy ;  Surgeon: Bentley Claudio M.D.;  Location: SUR[facility]ERY [facility];  Service:     OTHER ORTHOPEDIC SURGERY  1990    right knee scope    GROIN EXPLORATION      growth    KNEE ARTHROSCOPY         Exam:   /60 (BP Location: Left arm, Patient Position: Sitting, BP Cuff Size: Large adult)   Pulse 67   Temp 37.2 °C (98.9 °F) (Temporal)   Resp 20   Ht 1.829 m (6')   Wt 119 kg (262 lb 9.6 oz)   SpO2 93%  Body mass index is 35.61 kg/m².    Hearing good.    Dentition good  Alert, oriented in no acute distress.  Eye contact is good, speech goal directed, affect calm    Assessment and Plan. The following treatment and monitoring plan is recommended:    1. Encounter for Medicare annual wellness exam  2. Stage 3a chronic kidney disease  3. Prediabetes  - POCT  A1C  4. King's esophagus without dysplasia  5. Bilateral carpal tunnel syndrome  6. Essential hypertension  7. Nonrheumatic aortic valve insufficiency  8. MAVERICK (obstructive sleep apnea)  9. Chronic right-sided low back pain with left-sided sciatica  10. Chronic idiopathic gout involving toe of right foot  without tophus  11. Class 1 obesity due to excess calories without serious comorbidity with body mass index (BMI) of 32.0 to 32.9 in adult  12. Osteoarthritis of left glenohumeral joint  13. Need for vaccination  - Influenza Vaccine, High Dose (65+ Only)    Problem   Osteoarthritis of Left Glenohumeral Joint    Chronic, stable  Doing exercise and stretching      Chronic Idiopathic Gout Involving Toe of Right Foot Without Tophus    Chronic, stable  Current medication: allopurinol 200 mg.   Counseling and education provided to be compliant with medication.      Bilateral Carpal Tunnel Syndrome    Doing good  Surgery done        King's Esophagus Without Dysplasia    Chronic problem. stable  Currently taking omeprazole 20 mg once a day as needed   His last endoscopy in 2021. Followed by [facility]        Stage 3 Chronic Kidney Disease    Chronic,  stable   GFR 53   Patient is avoiding NSAIDs  Does have chronic history of HTN and prediabetes but well controlled          Chronic Right-Sided Low Back Pain With Left-Sided Sciatica    Chronic   Currently has increase in back pn  Patient with chronic right-sided low back pain more in the mornings.  Gets little better throughout the day.  Previously on muscle relaxers and pain meds.  Has underwent lumbar laminectomy.    Has NERVE STIMULATOR   Reports good results with gabapentin 300 mg BID as needed in pain control       Nonrheumatic Aortic Valve Insufficiency    Chronic, stable  Continue to monitor      Essential Hypertension    Chronic, stable  Onset: remote   Current Meds: lisinopril 10 mg daily   Side effects: none   Home BP Log: stable , well controlled   No associated symptoms of chest pain, SOB, dizziness       Prediabetes    Chronic, stable   Last A1C in jan 2024 , 6.0   Eating healthy low carb diet but during thanksgiving month might have had more sugars then usually . Is going to start working on diet again      Class 1 Obesity Due to Excess  Calories Without Serious Comorbidity With Body Mass Index (Bmi) of 32.0 to 32.9 in Adult    Chronic problem.   Usually tries to be active . walking every day   Weight mostly stable.   Reports moderately healthy diet and exercises regularly.      [place]ar (Obstructive Sleep Apnea)    Chronic, stable  Using cpap nightly with 1-2 L oxygen bled            Services suggested: No services needed at this time  Health Care Screening: Age-appropriate preventive services recommended by USPTF and ACIP covered by Medicare were discussed today. Services ordered if indicated and agreed upon by the patient.  Referrals offered: Community-based lifestyle interventions to reduce health risks and promote self-management and wellness, fall prevention, nutrition, physical activity, tobacco-use cessation, weight loss, and mental health services as per orders if indicated.    Discussion today about general wellness and lifestyle habits:    Prevent falls and reduce trip hazards; Cautioned about securing or removing rugs.  Have a working fire alarm and carbon monoxide detector;   Engage in regular physical activity and social activities     Follow-up: Return in about 6 months (around 6/9/2025).

## 2024-12-13 PROBLEM — E66.812 CLASS 2 OBESITY DUE TO EXCESS CALORIES WITHOUT SERIOUS COMORBIDITY WITH BODY MASS INDEX (BMI) OF 35.0 TO 35.9 IN ADULT: Status: ACTIVE | Noted: 2017-05-03

## 2024-12-17 NOTE — TELEPHONE ENCOUNTER
Received request via: Patient    Was the patient seen in the last year in this department? Yes    Does the patient have an active prescription (recently filled or refills available) for medication(s) requested? No    Does the patient have [facility] Plus and need 100-day supply? (This applies to ALL medications) Patient does not have SCP

## 2025-02-23 DIAGNOSIS — E78.00 ELEVATED LDL CHOLESTEROL LEVEL: ICD-10-CM

## 2025-02-23 DIAGNOSIS — I10 ESSENTIAL HYPERTENSION: ICD-10-CM

## 2025-02-25 RX ORDER — LISINOPRIL 10 MG/1
10 TABLET ORAL
Qty: 90 TABLET | Refills: 0 | Status: SHIPPED | OUTPATIENT
Start: 2025-02-25

## 2025-02-25 RX ORDER — ROSUVASTATIN CALCIUM 10 MG/1
10 TABLET, COATED ORAL EVERY EVENING
Qty: 90 TABLET | Refills: 0 | Status: SHIPPED | OUTPATIENT
Start: 2025-02-25

## 2025-03-01 DIAGNOSIS — M54.42 CHRONIC RIGHT-SIDED LOW BACK PAIN WITH LEFT-SIDED SCIATICA: ICD-10-CM

## 2025-03-01 DIAGNOSIS — G89.29 CHRONIC RIGHT-SIDED LOW BACK PAIN WITH LEFT-SIDED SCIATICA: ICD-10-CM

## 2025-03-03 RX ORDER — GABAPENTIN 300 MG/1
300 CAPSULE ORAL 2 TIMES DAILY PRN
Qty: 180 CAPSULE | Refills: 0 | Status: SHIPPED | OUTPATIENT
Start: 2025-03-03

## 2025-03-13 ENCOUNTER — HOSPITAL ENCOUNTER (OUTPATIENT)
Dept: LAB | Facility: MEDICAL CENTER | Age: 80
End: 2025-03-13
Attending: STUDENT IN AN ORGANIZED HEALTH CARE EDUCATION/TRAINING PROGRAM
Payer: MEDICARE

## 2025-03-13 DIAGNOSIS — R73.03 PREDIABETES: ICD-10-CM

## 2025-03-13 DIAGNOSIS — Z12.5 PROSTATE CANCER SCREENING: ICD-10-CM

## 2025-03-13 DIAGNOSIS — Z00.00 HEALTHCARE MAINTENANCE: ICD-10-CM

## 2025-03-13 DIAGNOSIS — N18.31 STAGE 3A CHRONIC KIDNEY DISEASE: ICD-10-CM

## 2025-03-13 LAB
ANION GAP SERPL CALC-SCNC: 12 MMOL/L (ref 7–16)
BUN SERPL-MCNC: 33 MG/DL (ref 8–22)
CALCIUM SERPL-MCNC: 9.5 MG/DL (ref 8.5–10.5)
CHLORIDE SERPL-SCNC: 107 MMOL/L (ref 96–112)
CO2 SERPL-SCNC: 18 MMOL/L (ref 20–33)
CREAT SERPL-MCNC: 1.55 MG/DL (ref 0.5–1.4)
EST. AVERAGE GLUCOSE BLD GHB EST-MCNC: 131 MG/DL
GFR SERPLBLD CREATININE-BSD FMLA CKD-EPI: 45 ML/MIN/1.73 M 2
GLUCOSE SERPL-MCNC: 114 MG/DL (ref 65–99)
HBA1C MFR BLD: 6.2 % (ref 4–5.6)
POTASSIUM SERPL-SCNC: 4.8 MMOL/L (ref 3.6–5.5)
PSA SERPL DL<=0.01 NG/ML-MCNC: 1.91 NG/ML (ref 0–4)
SODIUM SERPL-SCNC: 137 MMOL/L (ref 135–145)

## 2025-03-13 PROCEDURE — 84153 ASSAY OF PSA TOTAL: CPT | Mod: GA

## 2025-03-13 PROCEDURE — 36415 COLL VENOUS BLD VENIPUNCTURE: CPT

## 2025-03-13 PROCEDURE — 83036 HEMOGLOBIN GLYCOSYLATED A1C: CPT | Mod: GA

## 2025-03-13 PROCEDURE — 80048 BASIC METABOLIC PNL TOTAL CA: CPT

## 2025-03-17 DIAGNOSIS — M1A.9XX0 CHRONIC GOUT WITHOUT TOPHUS, UNSPECIFIED CAUSE, UNSPECIFIED SITE: ICD-10-CM

## 2025-03-17 RX ORDER — ALLOPURINOL 200 MG/1
1 TABLET ORAL
Qty: 90 TABLET | Refills: 0 | Status: SHIPPED | OUTPATIENT
Start: 2025-03-17

## 2025-03-18 ENCOUNTER — RESULTS FOLLOW-UP (OUTPATIENT)
Dept: MEDICAL GROUP | Facility: MEDICAL CENTER | Age: 80
End: 2025-03-18
Payer: MEDICARE

## 2025-05-24 DIAGNOSIS — E78.00 ELEVATED LDL CHOLESTEROL LEVEL: ICD-10-CM

## 2025-05-24 DIAGNOSIS — I10 ESSENTIAL HYPERTENSION: ICD-10-CM

## 2025-05-27 NOTE — TELEPHONE ENCOUNTER
Received request via: Pharmacy    Was the patient seen in the last year in this department? Yes    Does the patient have an active prescription (recently filled or refills available) for medication(s) requested? No    Ph[facility] Name[facility]    Does the patient have [facility] Plus and need 100-day supply? (This applies to ALL medications) Patient does not have SCP

## 2025-05-28 RX ORDER — ROSUVASTATIN CALCIUM 10 MG/1
10 TABLET, COATED ORAL EVERY EVENING
Qty: 90 TABLET | Refills: 0 | Status: SHIPPED | OUTPATIENT
Start: 2025-05-28

## 2025-05-28 RX ORDER — LISINOPRIL 10 MG/1
10 TABLET ORAL
Qty: 90 TABLET | Refills: 0 | Status: SHIPPED | OUTPATIENT
Start: 2025-05-28

## 2025-06-03 ENCOUNTER — OFFICE VISIT (OUTPATIENT)
Dept: MEDICAL GROUP | Facility: MEDICAL CENTER | Age: 80
End: 2025-06-03
Payer: MEDICARE

## 2025-06-03 VITALS
HEART RATE: 64 BPM | BODY MASS INDEX: 32.58 KG/M2 | HEIGHT: 72 IN | OXYGEN SATURATION: 95 % | SYSTOLIC BLOOD PRESSURE: 122 MMHG | TEMPERATURE: 98.2 F | DIASTOLIC BLOOD PRESSURE: 68 MMHG | WEIGHT: 240.52 LBS

## 2025-06-03 DIAGNOSIS — M1A.0710 CHRONIC IDIOPATHIC GOUT INVOLVING TOE OF RIGHT FOOT WITHOUT TOPHUS: ICD-10-CM

## 2025-06-03 DIAGNOSIS — I10 ESSENTIAL HYPERTENSION: ICD-10-CM

## 2025-06-03 DIAGNOSIS — B02.9 HERPES ZOSTER WITHOUT COMPLICATION: Primary | ICD-10-CM

## 2025-06-03 PROCEDURE — 99214 OFFICE O/P EST MOD 30 MIN: CPT | Performed by: FAMILY MEDICINE

## 2025-06-03 PROCEDURE — 3074F SYST BP LT 130 MM HG: CPT | Performed by: FAMILY MEDICINE

## 2025-06-03 PROCEDURE — 3078F DIAST BP <80 MM HG: CPT | Performed by: FAMILY MEDICINE

## 2025-06-03 RX ORDER — VALACYCLOVIR HYDROCHLORIDE 1 G/1
1000 TABLET, FILM COATED ORAL 3 TIMES DAILY
Qty: 21 TABLET | Refills: 0 | Status: SHIPPED | OUTPATIENT
Start: 2025-06-03 | End: 2025-06-10

## 2025-06-03 ASSESSMENT — PATIENT HEALTH QUESTIONNAIRE - PHQ9: CLINICAL INTERPRETATION OF PHQ2 SCORE: 0

## 2025-06-03 ASSESSMENT — FIBROSIS 4 INDEX: FIB4 SCORE: 1.15

## 2025-06-03 NOTE — PROGRESS NOTES
Verbal consent was acquired by the patient to use [employer] ambient listening note generation during this visit: YES    CC: shingle outbreak    Assessment & Plan:     1. Herpes zoster without complication (Primary)  History and exam are concerning for mild shingle outbreak at the right flank. Patient completed shingle vaccines.   - valacyclovir (VALTREX) 1 GM Tab; Take 1 Tablet by mouth 3 times a day for 7 days.  Dispense: 21 Tablet; Refill: 0    2. Essential hypertension  Chronic, controlled with Lisinopril 10 mg qd, no s/e reported, will continue.    Follow up with PCP     3. Chronic idiopathic gout involving toe of right foot without tophus  Chronic, controlled with Allopurinol 200 mg qd, no s/e reported, will continue.             Subjective:       HPI:     History of Present Illness  The patient presents for evaluation of abdominal pain.    He has been experiencing intermittent right-sided flank pain for the past week. The pain, which he describes as similar to a side ache experienced during strenuous physical activity, is rated at 3 to 4 on a scale of 10. It is not constant but lasts for approximately 30 minutes to an hour. He reports no associated nausea. He has been engaging in more gardening recently but is cautious about overexertion, particularly with bending over. The pain is exacerbated by bowel movements and straining. He reports no presence of masses, hematoma, or rashes in the affected area. He also reports no hematuria or difficulty urinating and has no history of kidney stones. He has no history of abdominal surgery and retains his appendix and gallbladder. He has received the shingles vaccine. He manages the pain with Tylenol, taking two capsules at night and in the morning. Denies fever or chills.     His bowel movements have been irregular, with occasional constipation. He drinks green tea, which aids his bowel movements.    He continues to take allopurinol and lisinopril, and his blood  pressure is well-controlled. He does not require any medication refills at this time.    He has a history of gout, which he describes as extremely painful, particularly in his big toe.      Current Medications[1]     Objective:     Exam:  /68 (BP Location: Right arm, Patient Position: Sitting, BP Cuff Size: Adult long)   Pulse 64   Temp 36.8 °C (98.2 °F) (Temporal)   Ht 1.829 m (6')   Wt 109 kg (240 lb 8.4 oz)   SpO2 95%   BMI 32.62 kg/m²  Body mass index is 32.62 kg/m².    Constitutional: awake, alert, in no distress.  Skin: Warm, dry, good turgor, no rashes, bruises, ulcers in visible areas.  Eye: conjunctiva clear, lids neg for edema or lesions.  Respiratory: Unlabored respiratory effort, lungs clear to auscultation, no wheezes, no rales.  Cardiovascular: Normal S1, S2, no murmur, no pedal edema.  Abdomen: Soft, non-tender to palpation, active BS, no hernia, no hepatosplenomegaly, negative rebound or guarding.   - mild erythema change of the skin with scattered, non-tender, erythematous papules noted along the right C6 dermatome.   Psych: Oriented x3, affect and mood wnl, intact judgement and insight.     Return for As needed.    We use Robert ([employer]-powered program) to document the visit. I also use Dragon (voice recognition software) to dictate part of the note. I have made every reasonable attempt to correct obvious errors, but I expect that there are errors of grammar and possibly content that I did not discover before finalizing the note.             [1]   Current Outpatient Medications:     valacyclovir (VALTREX) 1 GM Tab, Take 1 Tablet by mouth 3 times a day for 7 days., Disp: 21 Tablet, Rfl: 0    lisinopril (PRINIVIL) 10 MG Tab, TAKE 1 TABLET BY MOUTH EVERY DAY, Disp: 90 Tablet, Rfl: 0    rosuvastatin (CRESTOR) 10 MG Tab, TAKE 1 TABLET BY MOUTH EVERY DAY IN THE EVENING, Disp: 90 Tablet, Rfl: 0    Allopurinol 200 MG Tab, TAKE 1 TABLET BY MOUTH EVERY DAY, Disp: 90 Tablet, Rfl: 0    gabapentin  (NEURONTIN) 300 MG Cap, TAKE 1 CAPSULE BY MOUTH 2 TIMES A DAY AS NEEDED (BACK PAIN). INDICATIONS: NEUROPATHIC PAIN, Disp: 180 Capsule, Rfl: 0    omeprazole (PRILOSEC) 20 MG delayed-release capsule, TAKE 1 CAPSULE BY MOUTH EVERY DAY AS NEEDED FOR HEARTBURN, Disp: 90 Capsule, Rfl: 0    multivitamin (THERAGRAN) Tab, Take 1 Tab by mouth every day., Disp: , Rfl:

## 2025-06-15 DIAGNOSIS — M54.42 CHRONIC RIGHT-SIDED LOW BACK PAIN WITH LEFT-SIDED SCIATICA: ICD-10-CM

## 2025-06-15 DIAGNOSIS — G89.29 CHRONIC RIGHT-SIDED LOW BACK PAIN WITH LEFT-SIDED SCIATICA: ICD-10-CM

## 2025-06-16 RX ORDER — GABAPENTIN 300 MG/1
300 CAPSULE ORAL 2 TIMES DAILY PRN
Qty: 180 CAPSULE | Refills: 0 | Status: SHIPPED | OUTPATIENT
Start: 2025-06-16

## 2025-06-24 DIAGNOSIS — I10 ESSENTIAL HYPERTENSION: ICD-10-CM

## 2025-06-25 NOTE — TELEPHONE ENCOUNTER
Received request via: Pharmacy    Was the patient seen in the last year in this department? Yes    Does the patient have an active prescription (recently filled or refills available) for medication(s) requested? Yes.    Pt received 90 day supply on 5/24/25

## 2025-06-26 RX ORDER — LISINOPRIL 10 MG/1
10 TABLET ORAL
Qty: 90 TABLET | Refills: 0 | Status: SHIPPED | OUTPATIENT
Start: 2025-06-26

## 2025-07-02 DIAGNOSIS — M54.42 CHRONIC RIGHT-SIDED LOW BACK PAIN WITH LEFT-SIDED SCIATICA: ICD-10-CM

## 2025-07-02 DIAGNOSIS — M1A.9XX0 CHRONIC GOUT WITHOUT TOPHUS, UNSPECIFIED CAUSE, UNSPECIFIED SITE: ICD-10-CM

## 2025-07-02 DIAGNOSIS — G89.29 CHRONIC RIGHT-SIDED LOW BACK PAIN WITH LEFT-SIDED SCIATICA: ICD-10-CM

## 2025-07-03 RX ORDER — ALLOPURINOL 200 MG/1
1 TABLET ORAL
Qty: 90 TABLET | Refills: 0 | Status: SHIPPED | OUTPATIENT
Start: 2025-07-03

## 2025-07-03 RX ORDER — GABAPENTIN 300 MG/1
300 CAPSULE ORAL 2 TIMES DAILY PRN
Qty: 180 CAPSULE | Refills: 0 | Status: SHIPPED | OUTPATIENT
Start: 2025-07-03 | End: 2025-07-18

## 2025-07-03 NOTE — TELEPHONE ENCOUNTER
Received request via: Pharmacy    Was the patient seen in the last year in this department? Yes    Does the patient have an active prescription (recently filled or refills available) for medication(s) requested? No    [facility]    Does the patient have [facility] Plus and need 100-day supply? (This applies to ALL medications) Patient does not have SCP

## 2025-07-03 NOTE — TELEPHONE ENCOUNTER
Received request via: Pharmacy    Was the patient seen in the last year in this department? Yes    Does the patient have an active prescription (recently filled or refills available) for medication(s) requested? Yes.     [facility]    Does the patient have [facility] and need 100-day supply? (This applies to ALL medications) Patient does not have SCP    Please advise, pt received 90 day supply 6/15/25

## 2025-07-18 ENCOUNTER — APPOINTMENT (OUTPATIENT)
Dept: MEDICAL GROUP | Facility: MEDICAL CENTER | Age: 80
End: 2025-07-18
Payer: MEDICARE

## 2025-07-18 VITALS
DIASTOLIC BLOOD PRESSURE: 60 MMHG | HEART RATE: 62 BPM | OXYGEN SATURATION: 94 % | TEMPERATURE: 97 F | SYSTOLIC BLOOD PRESSURE: 120 MMHG | WEIGHT: 244.2 LBS | HEIGHT: 72 IN | BODY MASS INDEX: 33.08 KG/M2

## 2025-07-18 DIAGNOSIS — K22.70 BARRETT'S ESOPHAGUS WITHOUT DYSPLASIA: ICD-10-CM

## 2025-07-18 DIAGNOSIS — Z12.5 PROSTATE CANCER SCREENING: ICD-10-CM

## 2025-07-18 DIAGNOSIS — I10 ESSENTIAL HYPERTENSION: ICD-10-CM

## 2025-07-18 DIAGNOSIS — N18.31 STAGE 3A CHRONIC KIDNEY DISEASE: ICD-10-CM

## 2025-07-18 DIAGNOSIS — R73.03 PREDIABETES: ICD-10-CM

## 2025-07-18 DIAGNOSIS — R71.8 ELEVATED MCV: ICD-10-CM

## 2025-07-18 DIAGNOSIS — M54.41 CHRONIC RIGHT-SIDED LOW BACK PAIN WITH RIGHT-SIDED SCIATICA: Primary | ICD-10-CM

## 2025-07-18 DIAGNOSIS — M1A.0710 CHRONIC IDIOPATHIC GOUT INVOLVING TOE OF RIGHT FOOT WITHOUT TOPHUS: ICD-10-CM

## 2025-07-18 DIAGNOSIS — G89.29 CHRONIC RIGHT-SIDED LOW BACK PAIN WITH RIGHT-SIDED SCIATICA: Primary | ICD-10-CM

## 2025-07-18 DIAGNOSIS — E78.00 ELEVATED LDL CHOLESTEROL LEVEL: ICD-10-CM

## 2025-07-18 PROCEDURE — 99214 OFFICE O/P EST MOD 30 MIN: CPT | Performed by: STUDENT IN AN ORGANIZED HEALTH CARE EDUCATION/TRAINING PROGRAM

## 2025-07-18 PROCEDURE — 3078F DIAST BP <80 MM HG: CPT | Performed by: STUDENT IN AN ORGANIZED HEALTH CARE EDUCATION/TRAINING PROGRAM

## 2025-07-18 PROCEDURE — 3074F SYST BP LT 130 MM HG: CPT | Performed by: STUDENT IN AN ORGANIZED HEALTH CARE EDUCATION/TRAINING PROGRAM

## 2025-07-18 RX ORDER — GABAPENTIN 300 MG/1
300 CAPSULE ORAL 2 TIMES DAILY PRN
Qty: 180 CAPSULE | Refills: 0 | COMMUNITY
Start: 2025-07-18

## 2025-07-18 ASSESSMENT — FIBROSIS 4 INDEX: FIB4 SCORE: 1.15

## 2025-07-18 NOTE — PROGRESS NOTES
Subjective:     CC:   Follow up     HPI:   Valeriy presents today with  Verbal consent was acquired by the patient to use [employer] ambient listening note generation during this visit Yes   History of Present Illness  The patient presents for evaluation of headaches, blood pressure management, and memory issues.    He has been experiencing intermittent mild headaches, the cause of which is uncertain. He suspects they may be related to his reading glasses, even though his optometrist has not identified any issues with his vision. He has recently changed his reading glasses. He is not currently taking any ibuprofen-type medications for his headaches.    His sleep pattern is normal, and he uses a CPAP machine regularly, which is connected to an oxygen supply. He reports that this setup is beneficial for him.    He has decided to discontinue gabapentin due to concerns about its potential link to Alzheimer's disease, as suggested by a recent study. He reports no side effects from the medication and found it helpful when he was using it.    He also mentions that his memory is not as sharp as it used to be, but he is aware of this change.    He takes a multivitamin supplement.    Social History:  Sleep: Reports normal sleep pattern  Living Condition: Lives independently  BP is good , taking all medications  Current Outpatient Medications on File Prior to Visit   Medication Sig Dispense Refill    Allopurinol 200 MG Tab TAKE 1 TABLET BY MOUTH EVERY DAY 90 Tablet 0    lisinopril (PRINIVIL) 10 MG Tab TAKE 1 TABLET BY MOUTH EVERY DAY 90 Tablet 0    rosuvastatin (CRESTOR) 10 MG Tab TAKE 1 TABLET BY MOUTH EVERY DAY IN THE EVENING 90 Tablet 0    omeprazole (PRILOSEC) 20 MG delayed-release capsule TAKE 1 CAPSULE BY MOUTH EVERY DAY AS NEEDED FOR HEARTBURN 90 Capsule 0    multivitamin (THERAGRAN) Tab Take 1 Tab by mouth every day.       No current facility-administered medications on file prior to visit.         Results  Labs   -  Kidney Function Test: 03/2025, Not optimal   - A1c: 6.2   - Cholesterol levels: Normal   - PSA: Normal         Health Maintenance: Completed    ROS:  ROS    Review of systems unremarkable except for concerns noted by patient or items listed.    Please see HPI for additional ROS.      Objective:     Exam:  /60 (BP Location: Left arm, Patient Position: Sitting, BP Cuff Size: Adult long)   Pulse 62   Temp 36.1 °C (97 °F) (Temporal)   Ht 1.829 m (6')   Wt 111 kg (244 lb 3.2 oz)   SpO2 94%   BMI 33.12 kg/m²  Body mass index is 33.12 kg/m².    Physical Exam  Constitutional:       General: He is not in acute distress.     Appearance: Normal appearance.   HENT:      Head: Normocephalic.   Cardiovascular:      Rate and Rhythm: Normal rate and regular rhythm.      Pulses: Normal pulses.      Heart sounds: Normal heart sounds.   Pulmonary:      Effort: Pulmonary effort is normal.      Breath sounds: Normal breath sounds.   Skin:     General: Skin is warm.   Neurological:      Mental Status: He is alert and oriented to person, place, and time.   Psychiatric:         Mood and Affect: Mood normal.         Behavior: Behavior normal.           Labs: reviewed    Assessment & Plan:     79 y.o. male with the following -     1. Chronic right-sided low back pain with  sciatica  Chronic   Currently has increase in back pn  Patient with chronic right-sided low back pain more in the mornings.  Gets little better throughout the day.  Previously on muscle relaxers and pain meds.  Has underwent lumbar laminectomy.    Has NERVE STIMULATOR   Reports good results with gabapentin 300 mg BID as needed in pain control but - Expressed concerns about studies showing gabapentin as  potential links to Alzheimer's disease.  - Discussed that long-term use of gabapentin at high doses could potentially affect memory but Advised to continue gabapentin as needed for pain management. If still not acceptable we can try duloxetine /Cymbalta. Will  discuss in more details next visit         - gabapentin (NEURONTIN) 300 MG Cap; Take 1 Capsule by mouth 2 times a day as needed (back pain). Indications: Neuropathic Pain  Dispense: 180 Capsule; Refill: 0      2. Essential hypertension  Chronic, stable  Onset: remote   Current Meds: lisinopril 10 mg daily   Side effects: none   Home BP Log: stable , well controlled   No associated symptoms of chest pain, SOB, dizziness     - Comp Metabolic Panel; Future    3. King's esophagus without dysplasia  Chronic problem. stable  Currently taking omeprazole 20 mg once a day as needed   His last endoscopy in 2021. Followed by [facility]     4. Stage 3a chronic kidney disease  Chronic,  smild worsening of gFR  Patient is avoiding NSAIDs  Does have chronic history of HTN and prediabetes but well controlled    plan:  Repeat labs   Avoid nephrotoxins  Good hydration  - Comp Metabolic Panel; Future    5. Elevated LDL cholesterol level  - Lipid Profile; Future    6. Prediabetes  - HEMOGLOBIN A1C; Future    7. Elevated MCV  - CBC WITHOUT DIFFERENTIAL; Future    8.  Chronic idiopathic gout involving toe of right foot without tophus (Primary)  Chronic, stable  Current medication: allopurinol 200 mg.   Counseling and education provided to be compliant with medication.       Health maintenance  - A1c level is stable at 6.2, which is satisfactory  - Cholesterol levels are well-managed, and PSA levels are within the normal range  - Kidney function tests will be conducted in mid-September 2025 to monitor any changes  - Advised to maintain adequate hydration and avoid over-the-counter medications or unknown herbal products that could affect kidney function        Please note that this dictation was created using voice recognition software. I have made every reasonable attempt to correct obvious errors, but I expect that there are errors of grammar and possibly content that I did not discover before finalizing the  note.

## 2025-08-23 DIAGNOSIS — E78.00 ELEVATED LDL CHOLESTEROL LEVEL: ICD-10-CM

## 2025-08-26 RX ORDER — ROSUVASTATIN CALCIUM 10 MG/1
10 TABLET, COATED ORAL EVERY EVENING
Qty: 90 TABLET | Refills: 0 | Status: SHIPPED | OUTPATIENT
Start: 2025-08-26

## (undated) DEVICE — TRAY CATHETER FOLEY URINE METER W/STATLOCK 350ML (10EA/CA)

## (undated) DEVICE — PROTECTOR ULNA NERVE - (36PR/CA)

## (undated) DEVICE — SODIUM CHL IRRIGATION 0.9% 1000ML (12EA/CA)

## (undated) DEVICE — LACTATED RINGERS INJ. 500 ML - (24EA/CA)

## (undated) DEVICE — SUCTION INSTRUMENT YANKAUER BULBOUS TIP W/O VENT (50EA/CA)

## (undated) DEVICE — NEEDLE FILTER ASPIRATION 18 GA X 1 1/2 IN (100EA/BX)

## (undated) DEVICE — PACK NEURO - (2EA/CA)

## (undated) DEVICE — GLOVE BIOGEL PI INDICATOR SZ 7.0 SURGICAL PF LF - (50/BX 4BX/CA)

## (undated) DEVICE — SLEEVE, VASO, THIGH, MED

## (undated) DEVICE — NEPTUNE 4 PORT MANIFOLD - (20/PK)

## (undated) DEVICE — ELECTRODE DUAL RETURN W/ CORD - (50/PK)

## (undated) DEVICE — CHLORAPREP 26 ML APPLICATOR - ORANGE TINT(25/CA)

## (undated) DEVICE — KIT SURGIFLO W/OUT THROMBIN - (6EA/CA)

## (undated) DEVICE — FIBRILLAR SURGICEL 4X4 - 10/CA

## (undated) DEVICE — TUBING C&T SET FLYING LEADS DRAIN TUBING (10EA/BX)

## (undated) DEVICE — MIDAS LUBRICATOR DIFFUSER PACK (4EA/CA)

## (undated) DEVICE — CATHETER EPIDURAL PORTEX (10/BX) ***DISCONTINUED LOOKING INTO SUB***

## (undated) DEVICE — GOWN SURGICAL XX-LARGE - (28EA/CA) SIRUS NON REINFORCED

## (undated) DEVICE — DRAPE STRLE REG TOWEL 18X24 - (10/BX 4BX/CA)"

## (undated) DEVICE — SET LEADWIRE 5 LEAD BEDSIDE DISPOSABLE ECG (1SET OF 5/EA)

## (undated) DEVICE — ARMREST CRADLE FOAM - (2PR/PK 12PR/CA)

## (undated) DEVICE — LACTATED RINGERS INJ 1000 ML - (14EA/CA 60CA/PF)

## (undated) DEVICE — MASK ANESTHESIA ADULT  - (100/CA)

## (undated) DEVICE — FORCEP BIPOLAR ISOCOOL 8.5 1.0MM TIP"

## (undated) DEVICE — KIT ROOM DECONTAMINATION

## (undated) DEVICE — SUTURE GENERAL

## (undated) DEVICE — SENSOR SPO2 NEO LNCS ADHESIVE (20/BX) SEE USER NOTES

## (undated) DEVICE — SUTURE 1 VICRYL PLUS CTX - 8 X 18 INCH (12/BX)

## (undated) DEVICE — SOD. CHL. INJ. 0.9% 1000 ML - (14EA/CA 60CA/PF)

## (undated) DEVICE — KIT EVACUATER 3 SPRING PVC LF 1/8 DRAIN SIZE (10EA/CA)"

## (undated) DEVICE — GLOVE BIOGEL SZ 7 SURGICAL PF LTX - (50PR/BX 4BX/CA)

## (undated) DEVICE — SUTURE 2-0 VICRYL PLUS CT-1 - 8 X 18 INCH(12/BX)

## (undated) DEVICE — HEADREST PRONEVIEW LARGE - (10/CA)

## (undated) DEVICE — GLOVE BIOGEL INDICATOR SZ 7SURGICAL PF LTX - (50/BX 4BX/CA)

## (undated) DEVICE — DRAPE LAPAROTOMY T SHEET - (12EA/CA)

## (undated) DEVICE — SPONGE GAUZESTER. 2X2 4-PL - (2/PK 50PK/BX 30BX/CS)

## (undated) DEVICE — GVL 4 STAT DISPOSABLE - (10/BX)

## (undated) DEVICE — TOWELS CLOTH SURGICAL - (4/PK 20PK/CA)

## (undated) DEVICE — SYRINGE 30 ML LL (56/BX)

## (undated) DEVICE — TOOL DISSECT MATCH HEAD

## (undated) DEVICE — BONE MILL BM210

## (undated) DEVICE — CORDS BIPOLAR COAGULATION - 12FT STERILE DISP. (10EA/BX)

## (undated) DEVICE — GLOVE BIOGEL ECLIPSE  PF LATEX SIZE 6.5 (50PR/BX)

## (undated) DEVICE — GLOVE BIOGEL ECLIPSE PF LATEX SIZE 8.5 (50PR/BX)

## (undated) DEVICE — SYRINGE SAFETY 10 ML 18 GA X 1 1/2 BLUNT LL (100/BX 4BX/CA)

## (undated) DEVICE — PACK JACKSON TABLE KIT W/OUT - HR (6EA/CA)

## (undated) DEVICE — KIT ANESTHESIA W/CIRCUIT & 3/LT BAG W/FILTER (20EA/CA)

## (undated) DEVICE — DRAPE CLEAR W/ELASTIC BAND RAD CARM 40 X40" (20EA/CA)"

## (undated) DEVICE — GLOVE BIOGEL ECLIPSE PF LATEX SIZE 8.0  (50PR/BX)

## (undated) DEVICE — TUBING CLEARLINK DUO-VENT - C-FLO (48EA/CA)

## (undated) DEVICE — GLOVE SZ 6.5 BIOGEL PI MICRO - PF LF (50PR/BX)

## (undated) DEVICE — LIGHT SOURCE MIS 12FT

## (undated) DEVICE — CANISTER SUCTION 3000ML MECHANICAL FILTER AUTO SHUTOFF MEDI-VAC NONSTERILE LF DISP  (40EA/CA)

## (undated) DEVICE — BOVIE BLADE COATED &INSULATED - 25/PK

## (undated) DEVICE — TUBE E-T HI-LO CUFF 7.5MM (10EA/PK)

## (undated) DEVICE — SPONGE GAUZESTER 4 X 4 4PLY - (128PK/CA)

## (undated) DEVICE — BLADE SURGICAL CLIPPER - (50EA/CA)

## (undated) DEVICE — NEEDLE NON-SAFETY HYPO 21 GA X 1 1/2 IN HYPO (100/BX)

## (undated) DEVICE — SUTURE 0 VICRYL PLUS CT-2 - 8 X 18 INCH (12/BX)

## (undated) DEVICE — GOWN SURGEONS X-LARGE - DISP. (30/CA)

## (undated) DEVICE — ELECTRODE 850 FOAM ADHESIVE - HYDROGEL RADIOTRNSPRNT (50/PK)

## (undated) DEVICE — SET EXTENSION WITH 2 PORTS (48EA/CA) ***PART #2C8610 IS A SUBSTITUTE*****

## (undated) DEVICE — GOWN WARMING STANDARD FLEX - (30/CA)

## (undated) DEVICE — GLOVE BIOGEL SZ 6.5 SURGICAL PF LTX (50PR/BX 4BX/CA)

## (undated) DEVICE — GLOVE BIOGEL SZ 8.5 SURGICAL PF LTX - (50PR/BX 4BX/CA)

## (undated) DEVICE — GLOVE BIOGEL PI INDICATOR SZ 6.5 SURGICAL PF LF - (50/BX 4BX/CA)

## (undated) DEVICE — GLOVE BIOGEL PI ORTHO SZ 6 1/2 SURGICAL PF LF (40PR/BX)

## (undated) DEVICE — SUTURE 1 VICRYL PLUS CT-1 - 18 INCH (12/BX)

## (undated) DEVICE — DRESSING TRANSPARENT FILM TEGADERM 4 X 4.75" (50EA/BX)"